# Patient Record
Sex: FEMALE | Race: WHITE | NOT HISPANIC OR LATINO
[De-identification: names, ages, dates, MRNs, and addresses within clinical notes are randomized per-mention and may not be internally consistent; named-entity substitution may affect disease eponyms.]

---

## 2017-08-21 ENCOUNTER — RX ONLY (RX ONLY)
Age: 62
End: 2017-08-21

## 2017-08-21 RX ORDER — TRETINOIN MICROSPHERES 0.04 %
GEL (GRAM) TOPICAL
Qty: 1 | Refills: 6 | Status: ERX

## 2017-09-06 ENCOUNTER — RX ONLY (RX ONLY)
Age: 62
End: 2017-09-06

## 2017-09-06 ENCOUNTER — APPOINTMENT (OUTPATIENT)
Dept: URBAN - METROPOLITAN AREA CLINIC 200 | Age: 62
Setting detail: DERMATOLOGY
End: 2017-09-08

## 2017-09-06 DIAGNOSIS — L81.7 PIGMENTED PURPURIC DERMATOSIS: ICD-10-CM

## 2017-09-06 DIAGNOSIS — L57.8 OTHER SKIN CHANGES DUE TO CHRONIC EXPOSURE TO NONIONIZING RADIATION: ICD-10-CM

## 2017-09-06 DIAGNOSIS — D22 MELANOCYTIC NEVI: ICD-10-CM

## 2017-09-06 PROBLEM — F41.9 ANXIETY DISORDER, UNSPECIFIED: Status: ACTIVE | Noted: 2017-09-06

## 2017-09-06 PROBLEM — D22.5 MELANOCYTIC NEVI OF TRUNK: Status: ACTIVE | Noted: 2017-09-06

## 2017-09-06 PROBLEM — E13.9 OTHER SPECIFIED DIABETES MELLITUS WITHOUT COMPLICATIONS: Status: ACTIVE | Noted: 2017-09-06

## 2017-09-06 PROBLEM — D23.5 OTHER BENIGN NEOPLASM OF SKIN OF TRUNK: Status: ACTIVE | Noted: 2017-09-06

## 2017-09-06 PROCEDURE — 99213 OFFICE O/P EST LOW 20 MIN: CPT

## 2017-09-06 PROCEDURE — OTHER REASSURANCE: OTHER

## 2017-09-06 PROCEDURE — OTHER COUNSELING: OTHER

## 2017-09-06 RX ORDER — TRETINOIN MICROSPHERES 0.04 %
GEL (GRAM) TOPICAL
Qty: 1 | Refills: 6 | Status: ERX

## 2017-09-06 RX ORDER — HYDROQUINONE 4 %
CREAM (GRAM) TOPICAL
Qty: 1 | Refills: 6 | Status: CANCELLED

## 2017-09-06 RX ORDER — GLYCOPYRROLATE 1 MG/1
TABLET ORAL
Qty: 90 | Refills: 3 | Status: CANCELLED
Stop reason: CLARIF

## 2017-09-06 ASSESSMENT — LOCATION DETAILED DESCRIPTION DERM
LOCATION DETAILED: LEFT DISTAL PRETIBIAL REGION
LOCATION DETAILED: UPPER STERNUM

## 2017-09-06 ASSESSMENT — LOCATION ZONE DERM
LOCATION ZONE: TRUNK
LOCATION ZONE: LEG

## 2017-09-06 ASSESSMENT — LOCATION SIMPLE DESCRIPTION DERM
LOCATION SIMPLE: LEFT PRETIBIAL REGION
LOCATION SIMPLE: CHEST

## 2018-03-05 ENCOUNTER — OFFICE VISIT (OUTPATIENT)
Dept: OBSTETRICS AND GYNECOLOGY | Facility: CLINIC | Age: 63
End: 2018-03-05
Attending: OBSTETRICS & GYNECOLOGY
Payer: COMMERCIAL

## 2018-03-05 VITALS
SYSTOLIC BLOOD PRESSURE: 140 MMHG | HEIGHT: 61 IN | DIASTOLIC BLOOD PRESSURE: 80 MMHG | WEIGHT: 215 LBS | BODY MASS INDEX: 40.59 KG/M2

## 2018-03-05 DIAGNOSIS — Z01.419 WOMEN'S ANNUAL ROUTINE GYNECOLOGICAL EXAMINATION: ICD-10-CM

## 2018-03-05 DIAGNOSIS — Z11.51 SPECIAL SCREENING EXAMINATION FOR HUMAN PAPILLOMAVIRUS (HPV): ICD-10-CM

## 2018-03-05 DIAGNOSIS — Z00.00 ANNUAL PHYSICAL EXAM: Primary | ICD-10-CM

## 2018-03-05 DIAGNOSIS — Z12.4 ROUTINE CERVICAL SMEAR: ICD-10-CM

## 2018-03-05 PROCEDURE — 99396 PREV VISIT EST AGE 40-64: CPT | Performed by: OBSTETRICS & GYNECOLOGY

## 2018-03-05 RX ORDER — MULTIVITAMIN WITH IRON
TABLET ORAL
COMMUNITY

## 2018-03-05 RX ORDER — ACARBOSE 50 MG/1
TABLET ORAL
Refills: 11 | COMMUNITY
Start: 2018-01-16 | End: 2018-09-14 | Stop reason: SDUPTHER

## 2018-03-05 RX ORDER — METFORMIN HYDROCHLORIDE 500 MG/1
TABLET ORAL
COMMUNITY
Start: 2017-06-26 | End: 2018-08-16

## 2018-03-05 RX ORDER — SERTRALINE HYDROCHLORIDE 100 MG/1
100 TABLET, FILM COATED ORAL EVERY EVENING
Refills: 2 | COMMUNITY
Start: 2018-02-22

## 2018-03-05 RX ORDER — ASPIRIN 81 MG/1
TABLET ORAL
COMMUNITY
End: 2022-08-03

## 2018-03-05 RX ORDER — FENOFIBRIC ACID 135 MG/1
135 CAPSULE, DELAYED RELEASE ORAL
Refills: 5 | COMMUNITY
Start: 2018-02-13 | End: 2018-06-26 | Stop reason: SDUPTHER

## 2018-03-05 RX ORDER — LANCETS
EACH MISCELLANEOUS
COMMUNITY
Start: 2016-08-25 | End: 2019-01-03 | Stop reason: SDUPTHER

## 2018-03-05 RX ORDER — DULAGLUTIDE 1.5 MG/.5ML
INJECTION, SOLUTION SUBCUTANEOUS
Refills: 4 | COMMUNITY
Start: 2018-02-12 | End: 2018-03-26 | Stop reason: SDUPTHER

## 2018-03-05 NOTE — PROGRESS NOTES
"3/5/2018  Citlali Gifford is a 62 y.o. female who presents for annual exam.     Current contraception: none  History of abnormal Pap smear: no  Family history of:  colon  History of abnormal mammogram: no  Family history of breast cancer: no  History of abnormal lipids: no  Family history of heart disease:  no  Menstrual History:  OB History     No data available         No LMP recorded.         Review of Systems and Physical Exam  The following have been reviewed and updated as appropriate in this visit: Problems       /80 (BP Location: Left upper arm, Patient Position: Sitting)   Ht 1.549 m (5' 1\")   Wt 97.5 kg (215 lb)   BMI 40.62 kg/m²   ALEXANDRA Gifford presented today for routine gynecological vaginal evaluation.  She had some internal vaginal itching with minimal discharge review of systems were negative for chest pain shortness of breath she is passing urine okay moving her bowels no other gynecological issues her physical exam was normal.      Review of Systems  OBGyn Exam    Assessment and Plan    Assessment and plan is for routine follow-up in 1 year she is encouraged to lose weight numbers given for stephanie Reina who is a medical doctor with nutrition she is asked to speak to her medical doctor about Victoza along with exercise.    No orders of the defined types were placed in this encounter.          .  All questions answered.  Await pap smear results.  Breast self exam technique reviewed and patient encouraged to perform self-exam monthly..  No Follow-up on file.  Griffin Herrera MD      "

## 2018-03-06 LAB
CLINICAL INFO: NORMAL
CYTO CVX: NORMAL
CYTOLOGIST CVX/VAG CYTO: NORMAL
CYTOLOGY CMNT CVX/VAG CYTO-IMP: NORMAL
DATE OF PREVIOUS PAP: NORMAL
DATE PREVIOUS BX: NORMAL
HPV E6+E7 MRNA CVX QL NAA+PROBE: NOT DETECTED
LMP START DATE: NORMAL
SPECIMEN SOURCE CVX/VAG CYTO: NORMAL
STAT OF ADQ CVX/VAG CYTO-IMP: NORMAL

## 2018-03-12 ENCOUNTER — TELEPHONE (OUTPATIENT)
Dept: OBSTETRICS AND GYNECOLOGY | Facility: CLINIC | Age: 63
End: 2018-03-12

## 2018-03-12 NOTE — TELEPHONE ENCOUNTER
needs refil for [facility] 1/day Rx# [id number].  She asked for you to call other .[phone]

## 2018-03-13 RX ORDER — ESTRADIOL 10 UG/1
10 INSERT VAGINAL 2 TIMES WEEKLY
Qty: 24 TABLET | Refills: 3 | Status: SHIPPED | OUTPATIENT
Start: 2018-03-15 | End: 2019-02-19 | Stop reason: SDUPTHER

## 2018-03-23 LAB
ALBUMIN SERPL-MCNC: 4.3 G/DL (ref 3.6–5.1)
ALBUMIN/CREAT UR: 8 MCG/MG CREAT
ALBUMIN/GLOB SERPL: 1.4 (CALC) (ref 1–2.5)
ALP SERPL-CCNC: 52 U/L (ref 33–130)
ALT SERPL-CCNC: 63 U/L (ref 6–29)
AST SERPL-CCNC: 35 U/L (ref 10–35)
BILIRUB SERPL-MCNC: 0.4 MG/DL (ref 0.2–1.2)
BUN SERPL-MCNC: 23 MG/DL (ref 7–25)
BUN/CREAT SERPL: ABNORMAL (CALC) (ref 6–22)
CALCIUM SERPL-MCNC: 9.9 MG/DL (ref 8.6–10.4)
CHLORIDE SERPL-SCNC: 104 MMOL/L (ref 98–110)
CHOLEST SERPL-MCNC: 177 MG/DL
CHOLEST/HDLC SERPL: 4.3 (CALC)
CO2 SERPL-SCNC: 27 MMOL/L (ref 20–31)
CREAT SERPL-MCNC: 0.91 MG/DL (ref 0.5–0.99)
CREAT UR-MCNC: 156 MG/DL (ref 20–320)
GFR SERPL CREATININE-BSD FRML MDRD: 68 ML/MIN/1.73M2
GLOBULIN SER CALC-MCNC: 3 G/DL (CALC) (ref 1.9–3.7)
GLUCOSE SERPL-MCNC: 113 MG/DL (ref 65–99)
HBA1C MFR BLD: 6.6 % OF TOTAL HGB
HDLC SERPL-MCNC: 41 MG/DL
LDLC SERPL CALC-MCNC: 106 MG/DL (CALC)
MICROALBUMIN UR-MCNC: 1.2 MG/DL
NONHDLC SERPL-MCNC: 136 MG/DL (CALC)
POTASSIUM SERPL-SCNC: 4.5 MMOL/L (ref 3.5–5.3)
PROT SERPL-MCNC: 7.3 G/DL (ref 6.1–8.1)
SODIUM SERPL-SCNC: 138 MMOL/L (ref 135–146)
TRIGL SERPL-MCNC: 181 MG/DL

## 2018-03-26 ENCOUNTER — OFFICE VISIT (OUTPATIENT)
Dept: ENDOCRINOLOGY | Facility: CLINIC | Age: 63
End: 2018-03-26
Payer: COMMERCIAL

## 2018-03-26 VITALS
SYSTOLIC BLOOD PRESSURE: 138 MMHG | BODY MASS INDEX: 40.22 KG/M2 | HEIGHT: 61 IN | HEART RATE: 77 BPM | WEIGHT: 213 LBS | DIASTOLIC BLOOD PRESSURE: 84 MMHG

## 2018-03-26 DIAGNOSIS — I10 ESSENTIAL HYPERTENSION: ICD-10-CM

## 2018-03-26 DIAGNOSIS — E11.9 TYPE 2 DIABETES MELLITUS WITHOUT COMPLICATION, WITHOUT LONG-TERM CURRENT USE OF INSULIN (CMS/HCC): Primary | ICD-10-CM

## 2018-03-26 DIAGNOSIS — E78.2 MIXED HYPERLIPIDEMIA: ICD-10-CM

## 2018-03-26 PROBLEM — E78.5 DYSLIPIDEMIA: Status: ACTIVE | Noted: 2018-03-26

## 2018-03-26 PROCEDURE — 99214 OFFICE O/P EST MOD 30 MIN: CPT

## 2018-03-26 RX ORDER — DULAGLUTIDE 1.5 MG/.5ML
1.5 INJECTION, SOLUTION SUBCUTANEOUS WEEKLY
Qty: 4 PEN | Refills: 11 | Status: SHIPPED | OUTPATIENT
Start: 2018-03-26 | End: 2019-04-26 | Stop reason: SDUPTHER

## 2018-03-26 RX ORDER — SIMVASTATIN 10 MG/1
10 TABLET, FILM COATED ORAL NIGHTLY
Qty: 30 TABLET | Refills: 11 | Status: SHIPPED | OUTPATIENT
Start: 2018-03-26 | End: 2019-02-11 | Stop reason: SDUPTHER

## 2018-03-26 NOTE — PROGRESS NOTES
Daily Progress Note      Subjective      Patient ID: Citlali Gifford is a 62 y.o. female.    HPI 61 year old female with diabetes mellitus, HTN, obesity, presents for follow up visit.    Reviewed pmhx/med rec.  Lost 6 lbs.  Taking:  Trulicity 1.5 mg weekly.  Metformin 2000 mg BID.  Acarbose 50 mg pre meal.  Stopped jardiance in past with vaginal yeast infection, no recent infections.    No overactive bowels.  No increased abdominal cramps.    Not taking acarbose regularly.    Trying to limit carbs.  Using weight watchers.  Trying for regular exercise.    No changes in skin, vision.  Sees eye doctor yearly, no DR, blurry or lost vision.    Eats 3 meals per day, no carb restriction.    Has HTN, taking quinapril 10 mg daily, she tried higher dose stopped with fatigue.  No oral numbness or tingling.  No dizziness or light headedness.    Has dyslipidemia, taking trilipix 135 mg daily.  No muscle aches or pains.    No pancreatitis, no thyroid cancer.    Mother has DM2.    Diagnosed with DM2 3 years ago on screening with increased thirst/urination.    2/11/17  cr/gfr 0.7/>60  A1c 7.2  /44/101/163    9/19/17  cr/gfr 0.9/>60  A1c 6.7    3/22/18  A1c 6.6  /41/106/181  Cr/gfr 0.9/>60  Urine mc 8           Review of Systems    Per hpi      Current Outpatient Prescriptions   Medication Sig Dispense Refill   • acarbose (PRECOSE) 50 mg tablet TAKE 1 TABLET BY ORAL ROUTE 3 TIMES EVERY DAY AT THE START (WITH THE FIRST BITE) OF EACH MAIN MEAL  11   • aspirin (ASPIR-81) 81 mg enteric coated tablet take 1 tablet by oral route  every day     • blood sugar diagnostic (CONTOUR NEXT STRIPS) strip test twice daily     • choline fenofibrate (TRILIPIX) 135 mg capsule Take 135 mg by mouth once daily.  5   • DULAGLUTIDE (TRULICITY SUBQ) Inject under the skin.     • ESCITALOPRAM OXALATE (LEXAPRO ORAL) Take by mouth.     • estradiol (YUVAFEM) 10 mcg tablet Insert 1 tablet (10 mcg total) into the vagina 2 (two) times a week. 24  "tablet 3   • lancets (MICROLET LANCET) misc checks BGs 2x/day     • metFORMIN (GLUCOPHAGE) 1,000 mg tablet take 2 tablet by oral route 2 times every day with morning and evening meals     • METFORMIN HCL (METFORMIN ORAL) Take by mouth.     • omega-3 fatty acids-fish oil (omega-3) 300-1,000 mg capsule No SIG Entered     • sertraline (ZOLOFT) 100 mg tablet Take 100 mg by mouth every evening.  2   • sertraline (ZOLOFT) 50 mg tablet Take 50 mg by mouth daily.     • TRULICITY 1.5 mg/0.5 mL pen injector INJECT 0.5ML SUBCUTANEOUSLY EVERY WEEK IN THE ABDOMEN THIGH OR UPPER ARM ROTATING SITES  4     No current facility-administered medications for this visit.      No past medical history on file.  Family History   Problem Relation Age of Onset   • Colon cancer Paternal Grandfather      Past Surgical History:   Procedure Laterality Date   • DILATION AND EVACUATION     • GALLBLADDER SURGERY     • HERNIA REPAIR     • HYSTERECTOMY       Social History     Social History   • Marital status:      Spouse name: N/A   • Number of children: N/A   • Years of education: N/A     Occupational History   • Not on file.     Social History Main Topics   • Smoking status: Not on file   • Smokeless tobacco: Not on file   • Alcohol use Not on file   • Drug use: Unknown   • Sexual activity: Not on file     Other Topics Concern   • Not on file     Social History Narrative   • No narrative on file     Allergies   Allergen Reactions   • Sulfa (Sulfonamide Antibiotics)      Other reaction(s): Unknown   • Wellbutrin [Bupropion Hcl]      Other reaction(s): rash   • Augmentin [Amoxicillin-Pot Clavulanate] Rash       Objective    Vitals:    03/26/18 1012   BP: 138/84   BP Location: Left upper arm   Patient Position: Sitting   Pulse: 77   Weight: 96.6 kg (213 lb)   Height: 1.549 m (5' 1\")     Physical Exam   Constitutional: She is oriented to person, place, and time. She appears well-developed.   HENT:   Head: Normocephalic.   Eyes: Pupils are " equal, round, and reactive to light.   Neck: Normal range of motion.   Pulmonary/Chest: Effort normal.   Abdominal:   obese   Neurological: She is oriented to person, place, and time.   Skin:   No acanthosis   Psychiatric: She has a normal mood and affect.       Assessment/Plan       Type 2 diabetes mellitus without complication (CMS/HCC) (HCC)  Diabetes mellitus type 2 well controlled  Discussed home glucose and A1c goals  Rec ophtho, dental, podiatry, nutrition visits  Rec low consistent carb diet outlined, regular exercise, weight loss  Check testing 4 times per day  Trulicity 1.5 mg weekly  Metformin 1000 mg BID  Acarbose 50 mg TID  Discussed med side effects  Check testing for next visit  Spent 35 min with pt care greater than 3/4 directly with pt        Essential hypertension  HTN not at goal  Increase to quinapril 15 mg daily  Discussed med side effects  Check yearly urine mc neg      Dyslipidemia  Dyslipidemia not at goal  Start simvastatin 10 mg daily  Check 6 month FLP   Discussed med side effects          Francisco Samson MD  3/26/2018

## 2018-05-17 ENCOUNTER — TELEPHONE (OUTPATIENT)
Dept: CARDIOLOGY | Facility: HOSPITAL | Age: 63
End: 2018-05-17

## 2018-05-30 ENCOUNTER — TELEPHONE (OUTPATIENT)
Dept: ENDOCRINOLOGY | Facility: CLINIC | Age: 63
End: 2018-05-30

## 2018-06-26 ENCOUNTER — OFFICE VISIT (OUTPATIENT)
Dept: OTOLARYNGOLOGY | Facility: CLINIC | Age: 63
End: 2018-06-26
Payer: COMMERCIAL

## 2018-06-26 ENCOUNTER — PROCEDURE VISIT (OUTPATIENT)
Dept: OTOLARYNGOLOGY | Facility: CLINIC | Age: 63
End: 2018-06-26
Payer: COMMERCIAL

## 2018-06-26 VITALS
HEART RATE: 74 BPM | SYSTOLIC BLOOD PRESSURE: 128 MMHG | HEIGHT: 61 IN | DIASTOLIC BLOOD PRESSURE: 82 MMHG | OXYGEN SATURATION: 98 %

## 2018-06-26 DIAGNOSIS — H69.93 DYSFUNCTION OF BOTH EUSTACHIAN TUBES: ICD-10-CM

## 2018-06-26 DIAGNOSIS — J30.1 CHRONIC SEASONAL ALLERGIC RHINITIS DUE TO POLLEN: ICD-10-CM

## 2018-06-26 DIAGNOSIS — J34.2 NASAL SEPTAL DEVIATION: ICD-10-CM

## 2018-06-26 DIAGNOSIS — H90.3 SENSORINEURAL HEARING LOSS (SNHL) OF BOTH EARS: Primary | ICD-10-CM

## 2018-06-26 DIAGNOSIS — Z01.812 PRE-PROCEDURE LAB EXAM: ICD-10-CM

## 2018-06-26 DIAGNOSIS — H83.2X2 VESTIBULAR HYPOFUNCTION, LEFT: ICD-10-CM

## 2018-06-26 DIAGNOSIS — H69.93 CHRONIC DYSFUNCTION OF BOTH EUSTACHIAN TUBES: ICD-10-CM

## 2018-06-26 DIAGNOSIS — H90.A21 SENSORINEURAL HEARING LOSS (SNHL) OF RIGHT EAR WITH RESTRICTED HEARING OF LEFT EAR: Primary | ICD-10-CM

## 2018-06-26 PROCEDURE — 92567 TYMPANOMETRY: CPT | Performed by: AUDIOLOGIST

## 2018-06-26 PROCEDURE — 92557 COMPREHENSIVE HEARING TEST: CPT | Performed by: AUDIOLOGIST

## 2018-06-26 PROCEDURE — 99244 OFF/OP CNSLTJ NEW/EST MOD 40: CPT | Mod: 25 | Performed by: OTOLARYNGOLOGY

## 2018-06-26 RX ORDER — QUINAPRIL 10 MG/1
20 TABLET ORAL
Refills: 5 | COMMUNITY
Start: 2018-05-01 | End: 2019-06-26 | Stop reason: SDUPTHER

## 2018-06-26 RX ORDER — AZELASTINE 1 MG/ML
1 SPRAY, METERED NASAL NIGHTLY
Qty: 30 ML | Refills: 6 | Status: SHIPPED | OUTPATIENT
Start: 2018-06-26 | End: 2018-07-26

## 2018-06-26 RX ORDER — FENOFIBRIC ACID 135 MG/1
135 CAPSULE, DELAYED RELEASE ORAL
COMMUNITY
Start: 2016-12-23 | End: 2018-08-16

## 2018-06-26 RX ORDER — VIT C/E/ZN/COPPR/LUTEIN/ZEAXAN 250MG-90MG
CAPSULE ORAL
COMMUNITY
Start: 2016-12-23

## 2018-06-26 RX ORDER — LORAZEPAM 0.5 MG/1
0.5 TABLET ORAL ONCE
Qty: 2 TABLET | Refills: 0 | Status: SHIPPED | OUTPATIENT
Start: 2018-06-26 | End: 2021-01-12

## 2018-06-26 RX ORDER — MULTIVIT WITH MINERALS/HERBS
TABLET ORAL DAILY
COMMUNITY

## 2018-06-26 RX ORDER — NAPROXEN 500 MG/1
TABLET ORAL
Refills: 1 | COMMUNITY
Start: 2018-05-29 | End: 2018-07-30 | Stop reason: ALTCHOICE

## 2018-06-26 ASSESSMENT — ENCOUNTER SYMPTOMS
NECK STIFFNESS: 1
DYSPHORIC MOOD: 1
DIZZINESS: 1
SHORTNESS OF BREATH: 0
CONSTIPATION: 0
DIARRHEA: 0
ARTHRALGIAS: 0
ADENOPATHY: 0
BACK PAIN: 0
NERVOUS/ANXIOUS: 1
FATIGUE: 0
WEAKNESS: 0
NAUSEA: 0
WHEEZING: 0
FEVER: 0
SINUS PRESSURE: 0
HEADACHES: 0
VOMITING: 0
RHINORRHEA: 0
VOICE CHANGE: 0
SLEEP DISTURBANCE: 0
MYALGIAS: 1
PALPITATIONS: 0
TROUBLE SWALLOWING: 0
COUGH: 0
NECK PAIN: 1
POLYPHAGIA: 0
FREQUENCY: 0

## 2018-06-26 NOTE — PROGRESS NOTES
Patient ID: Citlali Gifford                              : 1955    Visit Date: 2018  Referring Provider: Billy Antunez DO    Chief Complaint: Hearing Loss and vertigo    Citlali Gifford is a 62 y.o. female who presented to the [place] office today for consultation in regard to hearing loss and previous vertigo.    Kailyn has been under the care of Dr. Félix Castillo for many years.  She is aware that she has significant hearing loss but has resisted a hearing aid up until now.  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  Background noise makes her difficulties even more severe.  She denies tinnitus.  She struggles with aural fullness that is particularly bothersome during an upper respiratory infection.  She has allergic rhinitis and this also causes fullness and pressure in the ears when it is not treated.  She does not always use Flonase routinely as it had been prescribed.    In , Kailyn developed an acute sudden onset of severe vertigo associated with nausea and vomiting.  She was treated with meclizine and the symptoms cleared.  Since then she has had two recurrences of the vertigo and each time has been able to rest, take meclizine and clear the symptoms.  She underwent a VNG in April.  I reviewed the entire study today.  This reveals left labyrinthine hypofunction consistent with previous left labyrinthitis.  Vestibular therapy was recommended but the patient has yet to be referred for that therapy.  She decided to seek out a second opinion.    The patient has not undergone otologic surgery nor has she suffered a head trauma..  She has a history of frequent otitis media with effusion and acute otitis externa as a child.  She denies tinnitus.  Her ear canals are sensitive to the touch and she is afraid of cerumen removal procedures.  She tends to wash her ears out with warm water in the shower.   She has not had drainage from the ears nor any itchiness.    Review of Systems   Constitutional: Negative for fatigue and fever.   HENT: Positive for ear pain, hearing loss and postnasal drip. Negative for congestion, nosebleeds, rhinorrhea, sinus pressure, tinnitus, trouble swallowing and voice change.         Aural fullness.   Eyes: Negative for visual disturbance.   Respiratory: Negative for cough, shortness of breath and wheezing.    Cardiovascular: Negative for chest pain and palpitations.   Gastrointestinal: Negative for constipation, diarrhea, nausea and vomiting.   Endocrine: Negative for polyphagia and polyuria.   Genitourinary: Negative for frequency.   Musculoskeletal: Positive for myalgias, neck pain and neck stiffness. Negative for arthralgias, back pain and gait problem.   Skin: Negative for rash.   Allergic/Immunologic: Negative for environmental allergies.   Neurological: Positive for dizziness. Negative for weakness and headaches.   Hematological: Negative for adenopathy.   Psychiatric/Behavioral: Positive for dysphoric mood. Negative for sleep disturbance. The patient is nervous/anxious.      Current Medications: Acarbose, aspirin, b complex vitamins, cholecalciferol (vitamin d3), choline fenofibrate, dulaglutide, estradiol, metformin, omega-3 fatty acids-fish oil, quinapril, sertraline and simvastatin.    Past Medical History: Type 2 diabetes mellitus.  Hyperlipidemia.  Hypertension.  Allergic rhinitis.  Past Surgical History: Status post  section, endometrial ablation, cholecystectomy, inguinal herniorrhaphy, partial hysterectomy and arthroscopic repair torn meniscus.  Social History: The patient is .  She has 3 adult children.  She works as a  in a school.  She rarely drinks alcohol.  She has never used tobacco.  She has not been exposed to excessive amounts of loud noise.  Family History: Mother: Diabetes mellitus.  Father: Coronary artery disease status post  "MI.  Allergies: Wellbutrin: Rash.  Penicillin: Rash.  Sulfa medications: Rash.    Physical Exam:  Vitals: /82   Pulse 74   Ht 1.549 m (5' 1\")   SpO2 98%   General:  Well-developed, centrally overweight 62 y.o. in no acute distress.  Voice: Normal without hoarseness, breathiness or stridor.  Head/face:  No scars or lesions.  No parotid masses. No presinus tenderness. Seventh cranial nerves intact.  Eyes:  Extraocular movements and gaze alignment normal.  No spontaneous or inducible nystagmus.  Ears: Auricles normal.  Cerumen removed bilaterally with micro-suction under binocular magnification.  Skin is slightly macerated but there is no evidence of otitis externa.  Tympanic membranes clear, mobile and without retractions.  Nose:  Dorsum straight.  Septum sinusoidal with partial obstruction of the airways.  Turbinates hypertrophic, purple and edematous.  No pus, polyps or crusts.  Oral Cavity/oropharynx: Macroglossia.  Normal tongue thrust. Normal gag reflex. No masses, leukoplakia, erythroplakia, ulcers or other abnormalities at the tongue, floor of mouth, buccal mucosa or palate.  Cobblestoning at the posterior pharyngeal wall.  Larynx/hypopharynx:  Normal supraglottis.  Vocal folds smooth and white.  Arytenoids edematous and mobile.  Interarytenoid and post-glottic mucosa edematous.  No masses at the base of tongue, vallecula or piriform sinuses.  Neck:  No masses, adenopathy, cervical spasm or thyroid enlargement.  Cranial Nerves II through XII: Grossly intact except for cranial nerves VIII.  Lungs: Clear throughout.  Heart: Regular rate and rhythm.  Mental status: Awake, alert and oriented ×3.  Anxious.    Audiogram: Mild sensorineural hearing loss from 500-6000 Hz on the right sloping to a more severe loss at 8000 Hz on that side.  Mild to moderately severe sensorineural hearing loss above 4000 Hz on the left.  Pure-tone disparities of 5-25 dB right worse than left.  Discrimination scores: 96% at 70 dB " on the right, 92% at 65 DP on the left.  Tympanogram: Negative pressure bilaterally.      Impression:  1.  Sensorineural hearing loss on the right worse than left.  2.  Left vestibular hypofunction.  3.  Bilateral eustachian tube dysfunction.    4.  Allergic rhinitis.  5.  Nasal septal deviation causing partial obstruction of the airways.    Recommendations at plan:  1.  Amplification is recommended for the right ear.  The patient must determine if she has coverage under her [employer] plan.  2.  Simply Saline nasal mist, 2 squirts to each nostril twice daily.  3.  Flonase Sensimist, 1 squirt to each nostril each morning.  4.  Astelin, 1 squirt to each nostril each evening until November.  5.  Habituation exercises twice daily.  Ultimately the patient may benefit from vestibular therapy if her symptoms recur.  6.  MRI of the brain and internal auditory canals to rule out retrocochlear lesion.  This was previously recommended but the patient did not follow through.  She requested a sedative drug for the procedure.  She was given a prescription for a single dose of lorazepam and she will undergo her study in an open, upright imaging center.  7.  Follow-up after MRI.          Lilian Willoughby MD

## 2018-06-26 NOTE — PATIENT INSTRUCTIONS
1.  You need a hearing aid for the right ear.  Please contact [employer] to determine if you already have coverage at specific facilities for hearing aid fitting.  We can help you at our [place] office but we are not part of the [employer] program.  2.  Simply saline nasal mist, 2 squirts to each nostril twice daily.  3.  Flonase Sensimist, 1 squirt to each nostril each morning.  4.  Astelin, 1 squirt to each nostril each evening until November.  5.  Habituation exercises.  Try all of the exercises.  If any of them make you feel dizzy, do these exercises twice daily until the dizziness reaction resolves.  If none of them exercises make you feel dizzy right now, you do not need to do any of them.  Please contact me if you develop vertigo.  6. Labs followed by MRI.  We will get authorization for the scan.  Take lorazepam 30 minutes before the scan.  7.  Follow-up after MRI.

## 2018-06-27 ENCOUNTER — OFFICE VISIT (OUTPATIENT)
Dept: CARDIOLOGY | Facility: CLINIC | Age: 63
End: 2018-06-27
Attending: INTERNAL MEDICINE
Payer: COMMERCIAL

## 2018-06-27 ENCOUNTER — HOSPITAL ENCOUNTER (OUTPATIENT)
Dept: CARDIOLOGY | Facility: HOSPITAL | Age: 63
Discharge: HOME | End: 2018-06-27
Attending: INTERNAL MEDICINE
Payer: COMMERCIAL

## 2018-06-27 VITALS
WEIGHT: 216 LBS | BODY MASS INDEX: 40.78 KG/M2 | HEART RATE: 68 BPM | DIASTOLIC BLOOD PRESSURE: 76 MMHG | SYSTOLIC BLOOD PRESSURE: 124 MMHG | HEIGHT: 61 IN | RESPIRATION RATE: 14 BRPM | TEMPERATURE: 98.6 F

## 2018-06-27 VITALS
BODY MASS INDEX: 40.22 KG/M2 | DIASTOLIC BLOOD PRESSURE: 80 MMHG | WEIGHT: 213 LBS | HEIGHT: 61 IN | SYSTOLIC BLOOD PRESSURE: 120 MMHG | HEART RATE: 69 BPM

## 2018-06-27 DIAGNOSIS — Z09 CARDIOLOGY FOLLOW-UP ENCOUNTER: Primary | ICD-10-CM

## 2018-06-27 DIAGNOSIS — R00.8 CANNONBALL PULSE: ICD-10-CM

## 2018-06-27 LAB
AORTIC ROOT ANNULUS - M-MODE: 2.9 CM
AORTIC VALVE MEAN VELOCITY: 1.74 M/S
AORTIC VALVE VELOCITY TIME INTEGRAL: 43.6 CM
AV MEAN GRADIENT: 14 MMHG
AV PEAK GRADIENT: 22 MMHG
AV PEAK VELOCITY-S: 2.35 M/S
AV VALVE AREA: 1.59 CM2
BSA FOR ECHO PROCEDURE: 2.04 M2
CUSP SEPARATION: 1.3 CM
DOP CALC LVOT STROKE VOLUME: 68.14 ML
E WAVE DECELERATION TIME: 335 MS
E/A RATIO: 0.7
E/E' RATIO: 8.8
E/LAT E' RATIO: 6.9
EDV (BP): 60 CM3
EF (A4C): 71 %
EF A2C: 68 %
EJECTION FRACTION: 70 %
EST RIGHT VENT SYSTOLIC PRESSURE BY TRICUSPID REGURGITATION JET: 24 MMHG
ESV (BP): 18 CM3
FRACTIONAL SHORTENING: 37.5 %
INTERVENTRICULAR SEPTUM: 1.2 CM
LA ESV (BP): 32 CM3
LA ESV INDEX (A2C): 11.76 CM3/M2
LA ESV INDEX (BP): 15.69 CM3/M2
LA/AORTA RATIO: 1.07
LAAS-AP2: 11.7 CM2
LAAS-AP4: 15.4 CM2
LAD 2D - M-MODE: 3.1 CM
LALD A4C: 4.6 CM
LALD A4C: 4.93 CM
LAV-S: 24 CM3
LEFT ATRIUM VOLUME INDEX: 19.12 CM3/M2
LEFT ATRIUM VOLUME: 39 CM3
LEFT INTERNAL DIMENSION IN SYSTOLE: 2 CM (ref 2.76–4.17)
LEFT VENTRICLE DIASTOLIC VOLUME INDEX: 30.88 CM3/M2
LEFT VENTRICLE DIASTOLIC VOLUME: 63 CM3
LEFT VENTRICLE SYSTOLIC VOLUME INDEX: 8.82 CM3/M2
LEFT VENTRICLE SYSTOLIC VOLUME: 18 CM3
LEFT VENTRICULAR INTERNAL DIMENSION IN DIASTOLE: 3.2 CM (ref 4.68–6.5)
LEFT VENTRICULAR POSTERIOR WALL IN END DIASTOLE: 1.2 CM (ref 0.61–1.14)
LV DIASTOLIC VOLUME: 59 CM3
LV ESV (APICAL 2 CHAMBER): 19 CM3
LVAD-AP2: 22.9 CM2
LVAD-AP4: 24.4 CM2
LVAS-AP2: 11.1 CM2
LVAS-AP4: 11.2 CM2
LVEDVI(A2C): 28.92 CM3/M2
LVEDVI(BP): 29.41 CM3/M2
LVESVI(A2C): 9.31 CM3/M2
LVESVI(BP): 8.82 CM3/M2
LVLD-AP2: 8.06 CM
LVLD-AP4: 7.96 CM
LVLS-AP2: 6.56 CM
LVLS-AP4: 6.62 CM
LVOT 2D: 2 CM
LVOT A: 3.14 CM2
LVOT MG: 3 MMHG
LVOT MV: 0.83 M/S
LVOT PEAK VELOCITY: 1.18 M/S
LVOT PG: 6 MMHG
LVOT VTI: 21.7 CM
MV E'TISSUE VEL-LAT: 0.06 M/S
MV E'TISSUE VEL-MED: 0.05 M/S
MV PEAK A VEL: 0.6 M/S
MV PEAK E VEL: 0.43 M/S
POSTERIOR WALL: 1.2 CM
RVOT VMAX: 0.84 M/S
RVOT VTI: 15.7 CM
TR MAX PG: 29 MMHG
TRICUSPID VALVE PEAK REGURGITATION VELOCITY: 2.69 M/S
Z-SCORE OF LEFT VENTRICULAR DIMENSION IN END DIASTOLE: -5.44
Z-SCORE OF LEFT VENTRICULAR DIMENSION IN END SYSTOLE: -4.18
Z-SCORE OF LEFT VENTRICULAR POSTERIOR WALL IN END DIASTOLE: 1.91

## 2018-06-27 PROCEDURE — 93000 ELECTROCARDIOGRAM COMPLETE: CPT | Performed by: INTERNAL MEDICINE

## 2018-06-27 PROCEDURE — 99214 OFFICE O/P EST MOD 30 MIN: CPT | Performed by: INTERNAL MEDICINE

## 2018-06-27 PROCEDURE — 93306 TTE W/DOPPLER COMPLETE: CPT

## 2018-06-27 PROCEDURE — 93306 TTE W/DOPPLER COMPLETE: CPT | Mod: 26 | Performed by: INTERNAL MEDICINE

## 2018-06-27 ASSESSMENT — ENCOUNTER SYMPTOMS
GASTROINTESTINAL NEGATIVE: 1
HEMATOLOGIC/LYMPHATIC NEGATIVE: 1
CARDIOVASCULAR NEGATIVE: 1
RESPIRATORY NEGATIVE: 1
CONSTITUTIONAL NEGATIVE: 1
NERVOUS/ANXIOUS: 1
NUMBNESS: 1
DIZZINESS: 1
EYES NEGATIVE: 1
MUSCULOSKELETAL NEGATIVE: 1

## 2018-06-27 NOTE — LETTER
June 27, 2018     Billy Antunez DO  [address] [facility] [address]    Patient: Citlali Gifford   YOB: 1955   Date of Visit: 6/27/2018       Dear Dr. Antunez:    Thank you for referring Citlali Gifford to me for evaluation. Below are my notes for this consultation.    If you have questions, please do not hesitate to call me. I look forward to following your patient along with you.         Sincerely,        Lupillo Cooley DO        CC: No Recipients  Lupillo Cooley DO  6/27/2018 11:26 AM  Signed      Chief Complaint: I saw Mrs. Gifford in the office today on a routine visit.  I also did a repeat echocardiogram.  She is hemodynamically stable and denies chest discomfort but has some shortness of breath with exertion, not anxiety, cold weather, postprandially, with sex, at rest, or nocturnally.  She can climb a flight of stairs without getting short of breath, she denies proximal nocturnal dyspnea, orthopnea, palpitations, syncope, ankle edema she has nocturia.  She feels tired but she is also very overweight.  Her echocardiogram from today did not show any severe aortic stenosis but mild mitral regurgitation mild tricuspid regurg normal LV function.       Medications:  Current Outpatient Prescriptions on File Prior to Visit   Medication Sig Dispense Refill   • acarbose (PRECOSE) 50 mg tablet TAKE 1 TABLET BY ORAL ROUTE 3 TIMES EVERY DAY AT THE START (WITH THE FIRST BITE) OF EACH MAIN MEAL  11   • aspirin (ASPIR-81) 81 mg enteric coated tablet take 1 tablet by oral route  every day     • azelastine (ASTELIN) 137 mcg (0.1 %) nasal spray Administer 1 spray into each nostril nightly. 30 mL 6   • b complex vitamins tablet No SIG Entered     • blood sugar diagnostic (CONTOUR NEXT STRIPS) strip test twice daily     • cholecalciferol, vitamin D3, (VITAMIN D3) 1,000 unit capsule One tablet daily     • choline fenofibrate (TRILIPIX) 135 mg capsule 135 mg.     • estradiol (YUVAFEM) 10 mcg  tablet Insert 1 tablet (10 mcg total) into the vagina 2 (two) times a week. 24 tablet 3   • lancets (MICROLET LANCET) misc checks BGs 2x/day     • metFORMIN (GLUCOPHAGE) 1,000 mg tablet take 1 tablet by oral route 2 times every day with morning and evening meals     • omega-3 fatty acids-fish oil (omega-3) 300-1,000 mg capsule take 1 capsule daily     • quinapril (ACCUPRIL) 10 mg tablet Take 10 mg by mouth once daily. Take 1 and 1/2 tablet daily   5   • sertraline (ZOLOFT) 100 mg tablet Take 100 mg by mouth every evening.  2   • simvastatin (ZOCOR) 10 mg tablet Take 1 tablet (10 mg total) by mouth nightly. 30 tablet 11   • TRULICITY 1.5 mg/0.5 mL pen injector Inject 0.5 mL (1.5 mg total) under the skin once a week. 4 pen 11   • DULAGLUTIDE (TRULICITY SUBQ) Inject under the skin.     • ESCITALOPRAM OXALATE (LEXAPRO ORAL) Take by mouth.     • LORazepam (ATIVAN) 0.5 mg tablet Take 1 tablet (0.5 mg total) by mouth once for 1 dose. Before MRI 2 tablet 0   • METFORMIN HCL (METFORMIN ORAL) Take by mouth.     • naproxen (NAPROSYN) 500 mg tablet TAKE 1 TABLET BY MOUTH TWICE A DAY AS NEEDED FOR PAIN WITH FOOD  1   • sertraline (ZOLOFT) 50 mg tablet Take 50 mg by mouth daily.       No current facility-administered medications on file prior to visit.        Review of Systems:  Review of Systems   Constitution: Negative.   HENT: Negative.    Eyes: Negative.    Cardiovascular: Negative.    Respiratory: Negative.    Endocrine: Positive for heat intolerance.   Hematologic/Lymphatic: Negative.    Skin: Negative.    Musculoskeletal: Negative.    Gastrointestinal: Negative.    Genitourinary: Positive for nocturia.   Neurological: Positive for dizziness and numbness.   Psychiatric/Behavioral: The patient is nervous/anxious.    Allergic/Immunologic: Positive for environmental allergies.       Physical Exam:  Vitals:    06/27/18 1039   BP: 124/76   Pulse: 68   Resp: 14   Temp: 37 °C (98.6 °F)     Physical Exam   Constitutional: She is  oriented to person, place, and time. She appears well-developed and well-nourished.   overweight   HENT:   Head: Normocephalic and atraumatic.   Eyes: Conjunctivae and EOM are normal. Pupils are equal, round, and reactive to light.   Neck: Normal range of motion. Neck supple.   Cardiovascular:   Murmur (2/6 4lsb) heard.  Pulmonary/Chest: Effort normal and breath sounds normal.   Abdominal: Soft. Bowel sounds are normal.   Musculoskeletal: Normal range of motion.   Neurological: She is alert and oriented to person, place, and time. She has normal reflexes.   Skin: Skin is warm and dry.   Psychiatric: She has a normal mood and affect. Her behavior is normal. Judgment and thought content normal.     EKG:SR T wave abn    Assessment and Plan:  Impression:  Fatigue.  Obesity.  Hypertension controlled.  Diabetes mellitus type 2.  Hyperlipidemia.  Recommendation: We spoke at length about diet and exercise.  I asked her to go back to weight watchers enroll in the point system so she can understand what every thing goes in her mouth because her in terms of calories and weight.  We encouraged her to exercise daily starting 20 minutes a day and adding 2 minutes every week to get to 45 minutes a day to lose weight.  We will see her again in one years time, if there is a problem we will see her sooner.    Lupillo Cooley, DO

## 2018-06-27 NOTE — PROGRESS NOTES
Chief Complaint: I saw Mrs. Gifford in the office today on a routine visit.  I also did a repeat echocardiogram.  She is hemodynamically stable and denies chest discomfort but has some shortness of breath with exertion, not anxiety, cold weather, postprandially, with sex, at rest, or nocturnally.  She can climb a flight of stairs without getting short of breath, she denies proximal nocturnal dyspnea, orthopnea, palpitations, syncope, ankle edema she has nocturia.  She feels tired but she is also very overweight.  Her echocardiogram from today did not show any severe aortic stenosis but mild mitral regurgitation mild tricuspid regurg normal LV function.       Medications:  Current Outpatient Prescriptions on File Prior to Visit   Medication Sig Dispense Refill   • acarbose (PRECOSE) 50 mg tablet TAKE 1 TABLET BY ORAL ROUTE 3 TIMES EVERY DAY AT THE START (WITH THE FIRST BITE) OF EACH MAIN MEAL  11   • aspirin (ASPIR-81) 81 mg enteric coated tablet take 1 tablet by oral route  every day     • azelastine (ASTELIN) 137 mcg (0.1 %) nasal spray Administer 1 spray into each nostril nightly. 30 mL 6   • b complex vitamins tablet No SIG Entered     • blood sugar diagnostic (CONTOUR NEXT STRIPS) strip test twice daily     • cholecalciferol, vitamin D3, (VITAMIN D3) 1,000 unit capsule One tablet daily     • choline fenofibrate (TRILIPIX) 135 mg capsule 135 mg.     • estradiol (YUVAFEM) 10 mcg tablet Insert 1 tablet (10 mcg total) into the vagina 2 (two) times a week. 24 tablet 3   • lancets (MICROLET LANCET) misc checks BGs 2x/day     • metFORMIN (GLUCOPHAGE) 1,000 mg tablet take 1 tablet by oral route 2 times every day with morning and evening meals     • omega-3 fatty acids-fish oil (omega-3) 300-1,000 mg capsule take 1 capsule daily     • quinapril (ACCUPRIL) 10 mg tablet Take 10 mg by mouth once daily. Take 1 and 1/2 tablet daily   5   • sertraline (ZOLOFT) 100 mg tablet Take 100 mg by mouth every evening.  2   •  simvastatin (ZOCOR) 10 mg tablet Take 1 tablet (10 mg total) by mouth nightly. 30 tablet 11   • TRULICITY 1.5 mg/0.5 mL pen injector Inject 0.5 mL (1.5 mg total) under the skin once a week. 4 pen 11   • DULAGLUTIDE (TRULICITY SUBQ) Inject under the skin.     • ESCITALOPRAM OXALATE (LEXAPRO ORAL) Take by mouth.     • LORazepam (ATIVAN) 0.5 mg tablet Take 1 tablet (0.5 mg total) by mouth once for 1 dose. Before MRI 2 tablet 0   • METFORMIN HCL (METFORMIN ORAL) Take by mouth.     • naproxen (NAPROSYN) 500 mg tablet TAKE 1 TABLET BY MOUTH TWICE A DAY AS NEEDED FOR PAIN WITH FOOD  1   • sertraline (ZOLOFT) 50 mg tablet Take 50 mg by mouth daily.       No current facility-administered medications on file prior to visit.        Review of Systems:  Review of Systems   Constitution: Negative.   HENT: Negative.    Eyes: Negative.    Cardiovascular: Negative.    Respiratory: Negative.    Endocrine: Positive for heat intolerance.   Hematologic/Lymphatic: Negative.    Skin: Negative.    Musculoskeletal: Negative.    Gastrointestinal: Negative.    Genitourinary: Positive for nocturia.   Neurological: Positive for dizziness and numbness.   Psychiatric/Behavioral: The patient is nervous/anxious.    Allergic/Immunologic: Positive for environmental allergies.       Physical Exam:  Vitals:    06/27/18 1039   BP: 124/76   Pulse: 68   Resp: 14   Temp: 37 °C (98.6 °F)     Physical Exam   Constitutional: She is oriented to person, place, and time. She appears well-developed and well-nourished.   overweight   HENT:   Head: Normocephalic and atraumatic.   Eyes: Conjunctivae and EOM are normal. Pupils are equal, round, and reactive to light.   Neck: Normal range of motion. Neck supple.   Cardiovascular:   Murmur (2/6 4lsb) heard.  Pulmonary/Chest: Effort normal and breath sounds normal.   Abdominal: Soft. Bowel sounds are normal.   Musculoskeletal: Normal range of motion.   Neurological: She is alert and oriented to person, place, and time.  She has normal reflexes.   Skin: Skin is warm and dry.   Psychiatric: She has a normal mood and affect. Her behavior is normal. Judgment and thought content normal.     EKG:SR T wave abn    Assessment and Plan:  Impression:  Fatigue.  Obesity.  Hypertension controlled.  Diabetes mellitus type 2.  Hyperlipidemia.  Recommendation: We spoke at length about diet and exercise.  I asked her to go back to weight watchers enroll in the point system so she can understand what every thing goes in her mouth because her in terms of calories and weight.  We encouraged her to exercise daily starting 20 minutes a day and adding 2 minutes every week to get to 45 minutes a day to lose weight.  We will see her again in one years time, if there is a problem we will see her sooner.    Lupillo Cooley, DO

## 2018-07-01 LAB
BUN SERPL-MCNC: 23 MG/DL (ref 7–25)
CREAT SERPL-MCNC: 0.84 MG/DL (ref 0.5–0.99)
GFR SERPL CREATININE-BSD FRML MDRD: 75 ML/MIN/1.73M2

## 2018-07-12 ENCOUNTER — OFFICE VISIT (OUTPATIENT)
Dept: SURGERY | Facility: CLINIC | Age: 63
End: 2018-07-12
Payer: COMMERCIAL

## 2018-07-12 VITALS
BODY MASS INDEX: 40.4 KG/M2 | HEART RATE: 82 BPM | TEMPERATURE: 98.1 F | DIASTOLIC BLOOD PRESSURE: 77 MMHG | WEIGHT: 214 LBS | HEIGHT: 61 IN | SYSTOLIC BLOOD PRESSURE: 126 MMHG

## 2018-07-12 DIAGNOSIS — Z86.0101 HISTORY OF ADENOMATOUS POLYP OF COLON: Primary | ICD-10-CM

## 2018-07-12 PROCEDURE — 99213 OFFICE O/P EST LOW 20 MIN: CPT | Performed by: COLON & RECTAL SURGERY

## 2018-07-12 RX ORDER — SODIUM CHLORIDE 9 MG/ML
INJECTION, SOLUTION INTRAVENOUS CONTINUOUS
Status: CANCELLED | OUTPATIENT
Start: 2018-07-31 | End: 2018-08-01

## 2018-07-12 NOTE — LETTER
Dear Dr. Antunez,    I saw Citlali Gifford in the office today in follow-up. Please see my note below.    If you have any additional questions, please do not hesitate to call me.    Sincerely,    Liam Toribio MD      ____________________________________    Colorectal Surgery Follow-up    Subjective     Citlali Gifford is a 62 y.o. female who is due for follow-up colon cancer screening.  Citlali has a history of colon polyps.  She had a polyp removed in the sigmoid colon  that had focal high-grade dysplasia.  In  she had another adenomatous polyp removed from the sigmoid colon.  Her last colonoscopy was 2015 and 4 polyps were removed at that time.  Because of her previous history of high-grade dysplasia and the number of polyps a 3 year colonoscopy was recommended.    She is not having any bowel issues such as blood in the stool or change in her bowel habits.  She has a family history of colon cancer in her paternal grandfather in his early 50s.  Her father had colon polyps.    Medical History:   Past Medical History:   Diagnosis Date   • Hyperthyroidism    • Type 2 diabetes mellitus (CMS/HCC) (HCC)        Surgical History:   Past Surgical History:   Procedure Laterality Date   •  SECTION     • CHOLECYSTECTOMY     • DILATION AND EVACUATION     • GALLBLADDER SURGERY     • HERNIA REPAIR     • HYSTERECTOMY         Allergies: Sulfa (sulfonamide antibiotics); Wellbutrin [bupropion hcl]; and Augmentin [amoxicillin-pot clavulanate]    Current Medications:  •  acarbose  •  aspirin  •  b complex vitamins  •  blood sugar diagnostic  •  cholecalciferol (vitamin D3)  •  choline fenofibrate  •  ESCITALOPRAM OXALATE (LEXAPRO ORAL)  •  estradiol  •  lancets  •  metFORMIN  •  omega-3 fatty acids-fish oil  •  quinapril  •  sertraline  •  simvastatin  •  TRULICITY  •  azelastine  •  DULAGLUTIDE (TRULICITY SUBQ)  •  LORazepam  •  METFORMIN HCL (METFORMIN ORAL)  •  naproxen  •   "sertraline    Objective     Physicial Exam  /77 (BP Location: Right forearm, Patient Position: Sitting)   Pulse 82   Temp 36.7 °C (98.1 °F) (Temporal)   Ht 1.549 m (5' 1\")   Wt 97.1 kg (214 lb)   BMI 40.43 kg/m²      Physical Exam   Constitutional: She is oriented to person, place, and time. She appears well-developed and well-nourished.   HENT:   Head: Normocephalic and atraumatic.   Eyes: Conjunctivae and EOM are normal. Pupils are equal, round, and reactive to light.   Neck: Normal range of motion. Neck supple.   Cardiovascular: Normal rate, regular rhythm and normal heart sounds.    Pulmonary/Chest: Breath sounds normal. She has no wheezes.   Abdominal: Soft. She exhibits no distension and no mass. There is no tenderness.   Musculoskeletal: Normal range of motion.   Lymphadenopathy:     She has no cervical adenopathy.   Neurological: She is alert and oriented to person, place, and time. No cranial nerve deficit.   Skin: Skin is warm and dry. No rash noted.   Psychiatric: She has a normal mood and affect.   Vitals reviewed.          Assessment     Problem List     History of adenomatous polyp of colon - Primary    Current Assessment & Plan     We reviewed her previous colonoscopies and history.  She is due for her next colonoscopy. We discussed the reason for colonoscopy and cancer screening.  We discussed the procedure and the risks and benefits of colonoscopy including the risk of perforation and bleeding from a polyp site.  We discussed bowel prep instructions.  she understands these issues and consents.    She did the MiraLAX prep previously.  Because of her diabetes, she did not want to use Gatorade and we will use smart water.           Relevant Orders    Case request operating room: FLEXIBLE COLONOSCOPY FOR COLORECTAL CANCER SCREENING HIGH RISK (Completed)                Liam Toribio MD      "

## 2018-07-12 NOTE — ASSESSMENT & PLAN NOTE
We reviewed her previous colonoscopies and history.  She is due for her next colonoscopy. We discussed the reason for colonoscopy and cancer screening.  We discussed the procedure and the risks and benefits of colonoscopy including the risk of perforation and bleeding from a polyp site.  We discussed bowel prep instructions.  she understands these issues and consents.    She did the MiraLAX prep previously.  Because of her diabetes, she did not want to use Gatorade and we will use smart water.

## 2018-07-12 NOTE — PATIENT INSTRUCTIONS
[facility]  [facility]    Miralax Prep  Colonoscopy Preparation Planner and Instructions      Your colonoscopy will be performed by  Liam Toribio Jr. M.D.     Location: [facility]. Report to 1st floor Admissions off the main lobby of the [facility].    You are scheduled for a colonoscopy, an examination of the colon (large intestine) with a lighted flexible scope.  During the colonoscopy, if an abnormality is seen, it is usually biopsied at that time.  A biopsy involves removing a portion or all of the abnormal area for processing and subsequent examination under a microscope.     Plan to be with us for a total of two to three hours.  When you arrive, you will need to complete your paperwork and then change into a patient gown.  The nursing staff will perform a brief assessment, place an I.V., and take you into the procedure room where you will be sedated and undergo the colonoscopy.  The colonoscopy itself takes about 15-30 minutes.     After the colonoscopy, you will rest in the recovery area while the sedative wears off.  Due to the sedation, you may not remember your conversation with the doctor after the colonoscopy.  Please have a family member or friend stay with you that can speak with the doctor and nurses after the procedure.  By law, you cannot drive the rest of the day of the colonoscopy.  We advise you to take the entire day off work.    A thorough cleansing of the colon is essential and the examination is most successful if you follow the directions for preparation completely, as outlined below.  If you have any questions about the test or preparation, please do not hesitate to call our office.    It is important for you to bring a list of all prescription medications and non-prescription products (over-the-counter, anti-inflammatory, herbal, vitamins, etc) you are taking and a list of any medications you are allergic to.      Purchase These Items Ahead of Time:  1. Four  Dulcolax tablets (does not need a prescription)  2. One 238-gram bottle of Miralax (over-the-counter, no prescription)  3. 64-ounce bottles of SmartWater  4. Tuck’s pads or Vaseline can be used to protect the anal area especially if you have hemorrhoids.      Colonoscopy Preparation Timeline  Five or more days prior to colonoscopy:  - Arrange for a ride.  If you do not have a ride, we will have to cancel the procedure.    - Purchase the laxative medications listed above.     - Consider obtaining a protective ointment such as Desitin, or Vaseline to protect the anal area during the prep.  You can start to apply it after you take the first laxative.    - If you are taking coumadin (warfarin), or other blood thinners, contact us for specific instructions.  Aspirin is usually stopped one week before the colonoscopy.    - Make any needed arrangements to be off work or school on the day of the colonoscopy.  Please remember, by law, you cannot drive the rest of the day of the colonoscopy.    - Read and familiarize yourself with the preparation instructions below.    - Please call us with any questions.      Three days prior to your colonoscopy:  - Review and plan dietary needs for the next two days.    - Confirm your ride.    - If you have questions, please call us.    Two days prior to your colonoscopy:  - Eat well-balanced meals but try to avoid nuts, popcorn, raw fruit, raw vegetables, and salads.    - List any allergies and all prescription medications and non-prescription products (over-the-counter, anti-inflammatory, herbal, vitamins, etc.) you are taking.  Bring these lists with you on the day of the colonoscopy.      One day prior to your colonoscopy:  - Start on a Clear Liquid Diet when you get up and continue all day.       Clear Liquid Diet  Soups:  Clear bouillon, chicken broth, vegetable broth, beef broth, or consommé    Beverages:  Tea, coffee (without cream/milk), Palomo-Aid, carbonated beverages,  Gatorade  You may add sugar to coffee and tea but not milk or creamer (non-dairy creamers are okay).      Juices:  Cranberry, apple, grape, strained lemonade, limeade, and orange drink  Any juice that you can see through and has no pulp is acceptable.      Dessert:  Italian ices, popsicles, Jell-O, and hard candy.    - Do not drink red colored beverages or eat red Jell-O.      - In order to prevent hunger, some patients drink liquid Sustacal or Ensure, nutritional supplements available over-the-counter in pharmacies. You can drink up to 4 cans up until midnight.  (Sustacal and Ensure are not clear, but are completely absorbed in the small bowel, and are okay.)    Mix the 238-gram bottle of Miralax in 64-ounces of SmartWater.  Shake the solution until the Miralax is dissolved.  Put the solution aside and chill - it helps with the taste.    - No solid food of any kind.      - Throughout the day, make sure to drink at least eight glasses (two quarts) of fluids such as Gatorade or a similar product, preferably not only plain water.    - Take your usual prescription medications (except iron).  If you are on coumadin or other blood thinners, please contact our office and your primary physician for specific instructions at least one week prior to the colonoscopy.    - Diabetic Patients:  You may have your usual breakfast today and should take your diabetic medications.  Monitor your blood sugar at your usual times.     5 p.m.  Take four Dulcolax tablets by mouth with 32-ounces of Clear liquids.    7 p.m.  Drink eight-ounces of the Miralax mixture every 10-15 minutes until the entire solution is gone    - Drink an additional 32-ounces of any clear liquid (without Miralax) over the next one to two hours.     Remember to remain close to toilet facilities!    - You may continue to have clear liquids until midnight.  After midnight, do not eat or drink anything except the colonoscopy prep and you should take your medications  with sips of water.      The day of your colonoscopy:  - Continue with all your usual prescription medications.  Please be sure to take any blood pressure or heart medications the morning of the test with a sip of water.      - Diabetic Patients:  Do not take your diabetes pills today, but bring a dose with you to take after your colonoscopy.  If you are on Insulin, take ½ of usual long-active insulin (such as NPH or lente) and no regular insulin.  Bring the remaining doses with you to take after your colonoscopy.    - When you are ready to leave, your designated  will take you home where you can eat and relax the rest of the day.  You will receive specific instructions about eating, activities, and medications before you leave.    Frequently Asked Questions  1. Is there any way that I can make this taste any better?    You can try sucking on hard candy or rinse your mouth with water or a mouthwash.  Do not eat or drink anything while you are drinking this solution.    2. Why avoid red liquids?    The red color can persist in the colon and potentially look like blood.    3. One of the medications I was instructed to take the morning of my procedure is red.  Can I take it?    Medications for blood pressure, heart conditions, and seizures should be taken the morning of your exam regardless of the color.    4. I feel like vomiting and do not think I can drink any more.  What should I do?    It is important that you continue to drink the solution if possible.  Without a clean bowel, the doctor will not be able to see the inside of your colon to complete the examination.  If you do vomit, wait 30 minutes and begin drinking the solution again.  If not improved, call us and have a phone number of an open pharmacy in case we need to call in a prescription.    5. I drank a lot of the solution and have not gone to the bathroom yet.  What should I do?    Keep drinking.  Most people have a bowel movement after an hour;  some patients may take two hours or longer.    6. I am taking the prep and now having loose, watery stools.  Do I still need the rest of the prep?    Yes, you may have solid stool higher in the colon that needs to be eliminated.    7. I already have diarrhea before taking the prep, do I still have to take the laxative?    Yes, you must take the prep as directed by your doctor.  Your colon is approximately six feet long.  The entire colon must be emptied for your physician to see the colon clearly.    8. I see yellow color in the toilet bowl and a few flecks.  What do I do?    If you drank the entire solution or if your last bowel movements were clear enough that you were able to see the bottom of the toilet, you should be fine.  It is okay if you have some flecks of material.  The yellow color is a result of bile that normally colors the feces.  This should not interfere with the examination.    9. My bottom is so sore.  What can I do?   To clean the area, avoid rubbing.  Gently pat with a wet washcloth.  Apply Vaseline, Balmex, or Desitin liberally.    10. Can I drink alcoholic beverages?    We strongly suggest that you do not drink any alcoholic beverages prior to your procedure since they can cause dehydration and some yusuf may thin your blood.    11. Can I drink any nutritional supplements?    You may drink Ensure (chocolate or vanilla) or Slim-Fast with Soy Protein/Lactose Free.  These drinks are water based, not milk based.    12. Can I chew gum or suck candy?    Yes, but nothing with soft centers or red color.    13. What if I am still passing stool the morning of my test?    Take a tap water enema until you run clear.  If this does not work, call the office.    14. Can I brush my teeth?    Please do.    15. Can I wear my dentures?    Yes, you may wear your dentures to the endoscopy suite.  However, you may be asked to remove them prior to the procedure.    16. I have been instructed not to take  anti-inflammatories or blood thinners several days before the procedure.  What can I take for headaches and pain relief?    You may take Tylenol as directed.    17. Can I have chicken soup?    You can only have the broth; no noodles, chicken, or vegetables allowed.    18. Can I have the colonoscopy if I am on my menstrual period?    Yes, the procedure can still be performed.  We ask that you use a tampon if possible (not absolutely necessary).    19. Do I need a prescription for the laxatives?    It depends on the preparation instructions you were given.  Miralax no longer requires a prescription.    20. Are there risks to the procedure?  If a polyp is removed, there is a 1:50 risk of bleeding from the polypectomy site, so if you see blood in the stool or bloody diarrhea, you must call me.    The biggest risk of the procedure is colon perforation (making a hole in the colon). The risk of this about 1:1000 (but has never happed to me!) This usually requires surgery to fix the hole. In most cases, I can detect this complication at the time of the procedure, but if you develop increasing abdominal pain after you go home, please contact me.    21. How effective is the procedure?  Although colonoscopy is the best test to evaluate the colon, there is about a 5% chance I miss something. This can be due to a poor bowel prep, which makes these preparation instructions so important.  It is possible to miss a small polyp hiding behind a fold. Occasionally, I am unable to get the scope all the way to the end of the colon (the cecum) due to a twisty or redundant colon. If that happens, I send you for X-Ray studies after the procedure to examine what was missed.       If you have any questions, please feel free to call us at [phone]. My  is Destiny Lennon and can be reached directly at [phone]

## 2018-07-17 ENCOUNTER — TELEPHONE (OUTPATIENT)
Dept: ENDOCRINOLOGY | Facility: CLINIC | Age: 63
End: 2018-07-17

## 2018-07-17 NOTE — TELEPHONE ENCOUNTER
Pt. called to schedule an appt for her fu diabetic appt. She was very short with me when I was trying to find an opening for her to come in in August. She wanted  An appt late in the day. She could not understand why it was difficult to get an appt when I explained to her that he was busy and she needed a 20 min. slot. She wanted my name and I did give it to her nicely. I also was asked to send her lab work which was ordered at her last visit. I sent the labs.

## 2018-07-19 ENCOUNTER — TELEPHONE (OUTPATIENT)
Dept: OTOLARYNGOLOGY | Facility: CLINIC | Age: 63
End: 2018-07-19

## 2018-07-19 NOTE — TELEPHONE ENCOUNTER
The patient should go over and look at the imaging center.  I sent her to an open upright MRI facility where she can watch television.  She absolutely needs an injection in order to be able to visualize the inner ear nerve.  She should take the medication and have somebody take her to the facility.  If she cannot follow through then she will have to cancel the procedure.  We will not perform this procedure under general anesthesia.

## 2018-07-19 NOTE — TELEPHONE ENCOUNTER
Pt called stating she is scheduled for an MRI at 6pm this evening, but says she is extremely claustrophobic even with medication and does not feel she can do it. She's asking if it's absolutely necessary to have the study done with contrast? Please advise, thank you

## 2018-07-20 ENCOUNTER — TELEPHONE (OUTPATIENT)
Dept: OTOLARYNGOLOGY | Facility: CLINIC | Age: 63
End: 2018-07-20

## 2018-07-20 DIAGNOSIS — H90.A21 SENSORINEURAL HEARING LOSS (SNHL) OF RIGHT EAR WITH RESTRICTED HEARING OF LEFT EAR: Primary | ICD-10-CM

## 2018-07-20 NOTE — TELEPHONE ENCOUNTER
Patient went to get MRI done yesterday and could not stand to be in the machine that long even with the medication.  She would like to know what else would you suggest she do?

## 2018-07-20 NOTE — TELEPHONE ENCOUNTER
Since the patient cannot tolerate MRI, I would suggest that she try to undergo a CT scan of the inner ears.  This is not as good of a study for her problem but at least will give us some information.  The CT scanner is wide open in a larger room so this should not be a problem.  I have prepared the appropriate referral.  Please contact her in this regard and then we will need to get precertification.

## 2018-07-25 ENCOUNTER — TELEPHONE (OUTPATIENT)
Dept: OTOLARYNGOLOGY | Facility: CLINIC | Age: 63
End: 2018-07-25

## 2018-07-25 NOTE — TELEPHONE ENCOUNTER
The labs from June 30 were normal.  We had contacted her with those normal results on July 3.  Open up the labs tab and you can read off the numbers if she actually needs that.

## 2018-07-31 ENCOUNTER — ANESTHESIA (OUTPATIENT)
Dept: ENDOSCOPY | Facility: HOSPITAL | Age: 63
End: 2018-07-31
Payer: COMMERCIAL

## 2018-07-31 ENCOUNTER — HOSPITAL ENCOUNTER (OUTPATIENT)
Facility: HOSPITAL | Age: 63
Discharge: HOME | End: 2018-07-31
Attending: COLON & RECTAL SURGERY | Admitting: COLON & RECTAL SURGERY
Payer: COMMERCIAL

## 2018-07-31 ENCOUNTER — ANESTHESIA EVENT (OUTPATIENT)
Dept: ENDOSCOPY | Facility: HOSPITAL | Age: 63
End: 2018-07-31
Payer: COMMERCIAL

## 2018-07-31 VITALS
SYSTOLIC BLOOD PRESSURE: 117 MMHG | RESPIRATION RATE: 37 BRPM | OXYGEN SATURATION: 99 % | TEMPERATURE: 96.7 F | HEART RATE: 63 BPM | DIASTOLIC BLOOD PRESSURE: 60 MMHG

## 2018-07-31 DIAGNOSIS — Z86.0101 HISTORY OF ADENOMATOUS POLYP OF COLON: ICD-10-CM

## 2018-07-31 LAB
GLUCOSE BLD-MCNC: 167 MG/DL (ref 70–99)
POCT TEST: ABNORMAL

## 2018-07-31 PROCEDURE — 0DBK8ZX EXCISION OF ASCENDING COLON, VIA NATURAL OR ARTIFICIAL OPENING ENDOSCOPIC, DIAGNOSTIC: ICD-10-PCS | Performed by: COLON & RECTAL SURGERY

## 2018-07-31 PROCEDURE — 37000002 HC ANESTHESIA MAC: Performed by: COLON & RECTAL SURGERY

## 2018-07-31 PROCEDURE — 63600000 HC DRUGS/DETAIL CODE: Mod: JW | Performed by: NURSE ANESTHETIST, CERTIFIED REGISTERED

## 2018-07-31 PROCEDURE — 25000000 HC PHARMACY GENERAL: Performed by: NURSE ANESTHETIST, CERTIFIED REGISTERED

## 2018-07-31 PROCEDURE — 75000011 HC COLONSCOPY BIOPSY: Performed by: COLON & RECTAL SURGERY

## 2018-07-31 PROCEDURE — 45380 COLONOSCOPY AND BIOPSY: CPT | Performed by: COLON & RECTAL SURGERY

## 2018-07-31 PROCEDURE — 88305 TISSUE EXAM BY PATHOLOGIST: CPT | Performed by: COLON & RECTAL SURGERY

## 2018-07-31 PROCEDURE — 25800000 HC PHARMACY IV SOLUTIONS: Performed by: NURSE ANESTHETIST, CERTIFIED REGISTERED

## 2018-07-31 RX ORDER — SODIUM CHLORIDE 9 MG/ML
INJECTION, SOLUTION INTRAVENOUS CONTINUOUS PRN
Status: DISCONTINUED | OUTPATIENT
Start: 2018-07-31 | End: 2018-07-31 | Stop reason: SURG

## 2018-07-31 RX ORDER — PROPOFOL 10 MG/ML
INJECTION, EMULSION INTRAVENOUS CONTINUOUS PRN
Status: DISCONTINUED | OUTPATIENT
Start: 2018-07-31 | End: 2018-07-31 | Stop reason: SURG

## 2018-07-31 RX ORDER — LIDOCAINE HYDROCHLORIDE 10 MG/ML
INJECTION, SOLUTION EPIDURAL; INFILTRATION; INTRACAUDAL; PERINEURAL AS NEEDED
Status: DISCONTINUED | OUTPATIENT
Start: 2018-07-31 | End: 2018-07-31 | Stop reason: SURG

## 2018-07-31 RX ORDER — PROPOFOL 200MG/20ML
SYRINGE (ML) INTRAVENOUS AS NEEDED
Status: DISCONTINUED | OUTPATIENT
Start: 2018-07-31 | End: 2018-07-31 | Stop reason: SURG

## 2018-07-31 RX ADMIN — SODIUM CHLORIDE: 9 INJECTION, SOLUTION INTRAVENOUS at 09:23

## 2018-07-31 RX ADMIN — PROPOFOL 40 MG: 10 INJECTION, EMULSION INTRAVENOUS at 09:32

## 2018-07-31 RX ADMIN — LIDOCAINE HYDROCHLORIDE 5 ML: 10 INJECTION, SOLUTION EPIDURAL; INFILTRATION; INTRACAUDAL; PERINEURAL at 09:29

## 2018-07-31 RX ADMIN — PROPOFOL 125 MCG/KG/MIN: 10 INJECTION, EMULSION INTRAVENOUS at 09:29

## 2018-07-31 RX ADMIN — PROPOFOL 40 MG: 10 INJECTION, EMULSION INTRAVENOUS at 09:29

## 2018-07-31 ASSESSMENT — PAIN SCALES - GENERAL: PAIN_LEVEL: 0

## 2018-07-31 NOTE — ANESTHESIA POSTPROCEDURE EVALUATION
Patient: Citlali Gifford    Procedure Summary     Date:  07/31/18 Room / Location:  [facility] (D) / [facility]    Anesthesia Start:  0923 Anesthesia Stop:  1007    Procedure:  FLEXIBLE COLONOSCOPY PROXIMAL TO SPLENIC FLEXURE WITH BIOPSY (N/A Anus) Diagnosis:       History of adenomatous polyp of colon      (History of adenomatous polyp of colon [Z86.010])    Provider:  Liam Toribio MD Responsible Provider:  David Abraham MD    Anesthesia Type:  MAC ASA Status:  3          Anesthesia Type: MAC  PACU Vitals  7/31/2018 1003 - 7/31/2018 1103      7/31/2018 1005 7/31/2018 1023 7/31/2018 1034 7/31/2018 1040    BP: 91/60 97/60 103/70 117/60    Temp: 36.4 °C (97.6 °F) 36.4 °C (97.6 °F) 36.5 °C (97.7 °F) (!)  35.9 °C (96.7 °F)    Pulse: 70 63 65 63    Resp: 15 (!)  22 (!)  35 (!)  37    SpO2: 99 % 99 % 99 % 99 %            Anesthesia Post Evaluation    Pain score: 0  Pain management: adequate  Patient location during evaluation: PACU  Patient participation: complete - patient participated  Level of consciousness: awake and alert  Cardiovascular status: acceptable  Airway Patency: adequate  Respiratory status: acceptable  Hydration status: acceptable  Anesthetic complications: no

## 2018-07-31 NOTE — OR SURGEON
Pre-Procedure patient identification:  I am the primary operating surgeon/proceduralist and I have identified the patient on 07/31/18 at 9:16 AM Liam Toribio MD  Phone Number: [phone]

## 2018-07-31 NOTE — ANESTHESIA PREPROCEDURE EVALUATION
Anesthesia ROS/MED HX      Pulmonary    Sleep apnea  Neuro/Psych    Anxiety  Cardiovascular   hypertension  Endo/Other   Diabetes   Body Habitus: Obese  ROS/MED HX Comments:    GI/Hepatic/Renal: Colon polyps   Musculoskeletal: Numbness in left foot      Past Surgical History:   Procedure Laterality Date   •  SECTION     • CHOLECYSTECTOMY     • DILATION AND EVACUATION     • ENDOMETRIAL ABLATION     • GALLBLADDER SURGERY     • HERNIA REPAIR     • HYSTERECTOMY     • KNEE ARTHROSCOPY Right     repair torn meniscus   • TONSILLECTOMY         Physical Exam    Airway   Mallampati: II   TM distance: >3 FB   Neck ROM: full  Cardiovascular    Rhythm: regular   Rate: normal  Pulmonary    clear to auscultation        Anesthesia Plan    Plan: MAC    Technique: MAC     Airway: natural airway / supplemental oxygen   ASA 3  Blood Products:   Use of Blood Products Discussed: No   Anesthetic plan and risks discussed with: patient  Induction:    intravenous   Postop Plan:   Patient Disposition: phase II then home   Pain Management: IV analgesics

## 2018-07-31 NOTE — OP NOTE
_______________________________________________________________________________  Patient Name: Citlali Gifford     Procedure Date: 7/31/2018 9:16 AM  MRN: [id number]                     Account Number: [id number]  YOB: 1955             Age: 62  Gender: Female                        Note Status: Finalized  Attending MD: DARA FERNANDEZ MD  _______________________________________________________________________________  Procedure:           Colonoscopy  Indications:         High risk colon cancer surveillance: Personal history of  colonic polyps  Providers:           DARA FERNANDEZ MD (Doctor), Michelle Colón, RN  (Nurse), Ivone Jack RN (Nurse)  Referring MD:        JORGE SANTOS DO  Requesting Provider:  Medicines:           Monitored Anesthesia Care  Complications:       No immediate complications.  _______________________________________________________________________________  Procedure:           After I obtained informed consent, the scope was passed  under direct vision. Throughout the procedure, the  patient's blood pressure, pulse, and oxygen saturations  were monitored continuously. The Colonoscope was  introduced through the anus and advanced to the cecum,  identified by appendiceal orifice and ileocecal valve.  The colonoscopy was performed without difficulty. The  patient tolerated the procedure well. The quality of the  bowel preparation was adequate.  Findings:  A 3 mm polyp was found in the mid ascending colon. The polyp was  sessile. The polyp was removed with a cold biopsy forceps. Resection and  retrieval were complete.  There is no endoscopic evidence of polyps in the rectum, in the sigmoid  colon, in the descending colon and in the transverse colon.  The exam was otherwise without abnormality on direct and retroflexion  views.  Impression:          - One 3 mm polyp in the mid ascending colon, removed  with a cold biopsy forceps. Resected and retrieved.  -  The examination was otherwise normal on direct and  retroflexion views.  Recommendation:      - Telephone my office for pathology results in 1 week.  - Repeat colonoscopy in 5 years for surveillance.  Procedure Code(s):   --- Professional ---  07020, Colonoscopy, flexible; with biopsy, single or  multiple  Diagnosis Code(s):   --- Professional ---  Z86.010, Personal history of colonic polyps  D12.2, Benign neoplasm of ascending colon  CPT copyright 2015 [employer] Association. All rights reserved.  The codes documented in this report are preliminary and upon  review may  be revised to meet current compliance requirements.  Attending Participation:  I personally performed the entire procedure.  Liam Fernandez JR, MD  __________________  LIAM FERNANDEZ MD  7/31/2018 10:06:48 AM  This report has been signed electronically.  Number of Addenda: 0  Note Initiated On: 7/31/2018 9:16 AM

## 2018-07-31 NOTE — H&P (VIEW-ONLY)
"Colorectal Surgery Follow-up    Subjective     Citlali Gifford is a 62 y.o. female who is due for follow-up colon cancer screening.  Citlali has a history of colon polyps.  She had a polyp removed in the sigmoid colon  that had focal high-grade dysplasia.  In  she had another adenomatous polyp removed from the sigmoid colon.  Her last colonoscopy was 2015 and 4 polyps were removed at that time.  Because of her previous history of high-grade dysplasia and the number of polyps a 3 year colonoscopy was recommended.    She is not having any bowel issues such as blood in the stool or change in her bowel habits.  She has a family history of colon cancer in her paternal grandfather in his early 50s.  Her father had colon polyps.    Medical History:   Past Medical History:   Diagnosis Date   • Hyperthyroidism    • Type 2 diabetes mellitus (CMS/HCC) (HCC)        Surgical History:   Past Surgical History:   Procedure Laterality Date   •  SECTION     • CHOLECYSTECTOMY     • DILATION AND EVACUATION     • GALLBLADDER SURGERY     • HERNIA REPAIR     • HYSTERECTOMY         Allergies: Sulfa (sulfonamide antibiotics); Wellbutrin [bupropion hcl]; and Augmentin [amoxicillin-pot clavulanate]    Current Medications:  •  acarbose  •  aspirin  •  b complex vitamins  •  blood sugar diagnostic  •  cholecalciferol (vitamin D3)  •  choline fenofibrate  •  ESCITALOPRAM OXALATE (LEXAPRO ORAL)  •  estradiol  •  lancets  •  metFORMIN  •  omega-3 fatty acids-fish oil  •  quinapril  •  sertraline  •  simvastatin  •  TRULICITY  •  azelastine  •  DULAGLUTIDE (TRULICITY SUBQ)  •  LORazepam  •  METFORMIN HCL (METFORMIN ORAL)  •  naproxen  •  sertraline    Objective     Physicial Exam  /77 (BP Location: Right forearm, Patient Position: Sitting)   Pulse 82   Temp 36.7 °C (98.1 °F) (Temporal)   Ht 1.549 m (5' 1\")   Wt 97.1 kg (214 lb)   BMI 40.43 kg/m²     Physical Exam   Constitutional: She is oriented to person, " place, and time. She appears well-developed and well-nourished.   HENT:   Head: Normocephalic and atraumatic.   Eyes: Conjunctivae and EOM are normal. Pupils are equal, round, and reactive to light.   Neck: Normal range of motion. Neck supple.   Cardiovascular: Normal rate, regular rhythm and normal heart sounds.    Pulmonary/Chest: Breath sounds normal. She has no wheezes.   Abdominal: Soft. She exhibits no distension and no mass. There is no tenderness.   Musculoskeletal: Normal range of motion.   Lymphadenopathy:     She has no cervical adenopathy.   Neurological: She is alert and oriented to person, place, and time. No cranial nerve deficit.   Skin: Skin is warm and dry. No rash noted.   Psychiatric: She has a normal mood and affect.   Vitals reviewed.          Assessment     Problem List     History of adenomatous polyp of colon - Primary    Current Assessment & Plan     We reviewed her previous colonoscopies and history.  She is due for her next colonoscopy. We discussed the reason for colonoscopy and cancer screening.  We discussed the procedure and the risks and benefits of colonoscopy including the risk of perforation and bleeding from a polyp site.  We discussed bowel prep instructions.  she understands these issues and consents.    She did the MiraLAX prep previously.  Because of her diabetes, she did not want to use Gatorade and we will use smart water.           Relevant Orders    Case request operating room: FLEXIBLE COLONOSCOPY FOR COLORECTAL CANCER SCREENING HIGH RISK (Completed)                Liam Toribio MD

## 2018-08-01 ENCOUNTER — TELEPHONE (OUTPATIENT)
Dept: SURGERY | Facility: CLINIC | Age: 63
End: 2018-08-01

## 2018-08-01 LAB
CASE RPRT: NORMAL
CLINICAL INFO: NORMAL
PATH REPORT.FINAL DX SPEC: NORMAL
PATH REPORT.GROSS SPEC: NORMAL

## 2018-08-06 ENCOUNTER — RX ONLY (RX ONLY)
Age: 63
End: 2018-08-06

## 2018-08-06 ENCOUNTER — APPOINTMENT (OUTPATIENT)
Dept: URBAN - METROPOLITAN AREA CLINIC 200 | Age: 63
Setting detail: DERMATOLOGY
End: 2018-08-16

## 2018-08-06 DIAGNOSIS — B00.1 HERPESVIRAL VESICULAR DERMATITIS: ICD-10-CM

## 2018-08-06 DIAGNOSIS — L57.8 OTHER SKIN CHANGES DUE TO CHRONIC EXPOSURE TO NONIONIZING RADIATION: ICD-10-CM

## 2018-08-06 DIAGNOSIS — E11.65 TYPE 2 DIABETES MELLITUS WITH HYPERGLYCEMIA: ICD-10-CM

## 2018-08-06 PROBLEM — L70.0 ACNE VULGARIS: Status: ACTIVE | Noted: 2018-08-06

## 2018-08-06 PROBLEM — D23.4 OTHER BENIGN NEOPLASM OF SKIN OF SCALP AND NECK: Status: ACTIVE | Noted: 2018-08-06

## 2018-08-06 PROBLEM — E13.9 OTHER SPECIFIED DIABETES MELLITUS WITHOUT COMPLICATIONS: Status: ACTIVE | Noted: 2018-08-06

## 2018-08-06 PROCEDURE — 99213 OFFICE O/P EST LOW 20 MIN: CPT

## 2018-08-06 PROCEDURE — OTHER RECOMMENDATIONS: OTHER

## 2018-08-06 PROCEDURE — OTHER COUNSELING: OTHER

## 2018-08-06 PROCEDURE — OTHER PRESCRIPTION: OTHER

## 2018-08-06 RX ORDER — ACYCLOVIR 50 MG/G
OINTMENT TOPICAL
Qty: 1 | Refills: 3 | Status: ERX

## 2018-08-06 RX ORDER — ACYCLOVIR 50 MG/G
OINTMENT TOPICAL
Qty: 1 | Refills: 3 | Status: ERX | COMMUNITY
Start: 2018-08-06

## 2018-08-06 ASSESSMENT — LOCATION ZONE DERM
LOCATION ZONE: TRUNK
LOCATION ZONE: LIP

## 2018-08-06 ASSESSMENT — LOCATION DETAILED DESCRIPTION DERM
LOCATION DETAILED: LEFT MEDIAL SUPERIOR CHEST
LOCATION DETAILED: LEFT INFERIOR VERMILION LIP

## 2018-08-06 ASSESSMENT — LOCATION SIMPLE DESCRIPTION DERM
LOCATION SIMPLE: CHEST
LOCATION SIMPLE: LEFT LIP

## 2018-08-06 NOTE — PROCEDURE: RECOMMENDATIONS
Detail Level: Generalized
Recommendation Preamble: The following recommendations were made during the visit:
Recommendations (Free Text): Nutrition consult to [facility]

## 2018-08-07 ENCOUNTER — OFFICE VISIT (OUTPATIENT)
Dept: OTOLARYNGOLOGY | Facility: CLINIC | Age: 63
End: 2018-08-07
Payer: COMMERCIAL

## 2018-08-07 VITALS
DIASTOLIC BLOOD PRESSURE: 82 MMHG | SYSTOLIC BLOOD PRESSURE: 122 MMHG | WEIGHT: 216.8 LBS | OXYGEN SATURATION: 98 % | HEIGHT: 61 IN | HEART RATE: 74 BPM | BODY MASS INDEX: 40.93 KG/M2

## 2018-08-07 DIAGNOSIS — H69.93 CHRONIC EUSTACHIAN TUBE DYSFUNCTION, BILATERAL: ICD-10-CM

## 2018-08-07 DIAGNOSIS — H90.A21 SENSORINEURAL HEARING LOSS (SNHL) OF RIGHT EAR WITH RESTRICTED HEARING OF LEFT EAR: ICD-10-CM

## 2018-08-07 DIAGNOSIS — J30.1 CHRONIC SEASONAL ALLERGIC RHINITIS DUE TO POLLEN: ICD-10-CM

## 2018-08-07 DIAGNOSIS — H65.491 CHRONIC NONSUPPURATIVE OTITIS MEDIA, RIGHT: Primary | ICD-10-CM

## 2018-08-07 PROCEDURE — 99213 OFFICE O/P EST LOW 20 MIN: CPT | Performed by: OTOLARYNGOLOGY

## 2018-08-07 RX ORDER — MELOXICAM 15 MG/1
15 TABLET ORAL
Refills: 1 | COMMUNITY
Start: 2018-07-29 | End: 2018-08-07 | Stop reason: ALTCHOICE

## 2018-08-07 RX ORDER — AZELASTINE 1 MG/ML
1 SPRAY, METERED NASAL DAILY
COMMUNITY
End: 2023-03-23

## 2018-08-07 ASSESSMENT — ENCOUNTER SYMPTOMS
DIZZINESS: 0
HEADACHES: 0
DIARRHEA: 0
CONSTIPATION: 0
FREQUENCY: 0
SINUS PRESSURE: 0
MYALGIAS: 0
NAUSEA: 0
WHEEZING: 0
TROUBLE SWALLOWING: 0
RHINORRHEA: 0
WEAKNESS: 0
VOMITING: 0
POLYPHAGIA: 0
NERVOUS/ANXIOUS: 0
SHORTNESS OF BREATH: 0
FEVER: 0
ARTHRALGIAS: 0
VOICE CHANGE: 0
BACK PAIN: 0
FATIGUE: 0
PALPITATIONS: 0
DYSPHORIC MOOD: 0
SLEEP DISTURBANCE: 0
COUGH: 0
ADENOPATHY: 0

## 2018-08-07 NOTE — PROGRESS NOTES
[facility]  [address], [address]  [place] [address]  Phone: [phone]  Fax: [phone]      Patient ID: Citlali Gifford                              : 1955    Visit Date: 2018  Referring Provider: Billy Antunez DO    Chief Complaint: Earring loss, right worse than left    Citlali Gifford returned to the [place] office today in regard to history of right sensorineural hearing loss worse than left.    Since seeing me in , Kailyn underwent a CT scan of the temporal bones with contrast because she could not tolerate an MRI even in an upright open scanner.  The study does not reveal any brain or skull base abnormalities but it does shows chronic inflammatory changes in the right mastoid and in the right middle ear space as well. I reviewed the films with the patient today. The primary area of concern is in the right attic where there is partial opacification.  The ossicles appear intact.  The sinuses are clear.    Kailyn had a brief episode of vertigo which she treated with meclizine and rest with good results.  She feels well now.  She continues on Astelin and saline but forgot to purchase the Flonase sensimist.    Review of Systems   Constitutional: Negative for fatigue and fever.   HENT: Positive for hearing loss and tinnitus. Negative for congestion, nosebleeds, postnasal drip, rhinorrhea, sinus pressure, trouble swallowing and voice change.    Eyes: Negative for visual disturbance.   Respiratory: Negative for cough, shortness of breath and wheezing.    Cardiovascular: Negative for chest pain and palpitations.   Gastrointestinal: Negative for constipation, diarrhea, nausea and vomiting.   Endocrine: Negative for polyphagia and polyuria.   Genitourinary: Negative for frequency.   Musculoskeletal: Negative for arthralgias, back pain, gait problem and myalgias.   Skin: Negative for rash.   Allergic/Immunologic: Negative for environmental allergies.  "  Neurological: Negative for dizziness, weakness and headaches.   Hematological: Negative for adenopathy.   Psychiatric/Behavioral: Negative for dysphoric mood and sleep disturbance. The patient is not nervous/anxious.      Current Medications: Acarbose, aspirin, b complex vitamins, cholecalciferol (vitamin d3), choline fenofibrate, dulaglutide, estradiol, meclizine, metformin, omega-3-fish oil, quinapril, sertraline, simvastatin and Trulicity.    Physical Exam:  Vitals: /82   Pulse 74   Ht 1.549 m (5' 1\")   Wt 98.3 kg (216 lb 12.8 oz)   SpO2 98%   BMI 40.96 kg/m²   General:  Well-developed, well-nourished 62 y.o. who is in no acute distress.  Voice: Normal without hoarseness, breathiness or stridor.  Head/face:  No scars or lesions.  No parotid masses. No presinus tenderness. Seventh cranial nerves intact.  Eyes:  Extraocular movements and gaze alignment normal.  Ears: Auricles normal.  External auditory canals free of cerumen.  Tympanic membranes mostly white with superficial scar and slightly retracted.  Nose:  Dorsum straight.  Septum sinusoidal.  Turbinates purple and edematous.  No pus, polyps or crusts.  Oral Cavity/oropharynx: Normal tongue thrust. Normal gag reflex. No masses, leukoplakia, erythroplakia, ulcers or other abnormalities at the tongue, floor of mouth, buccal mucosa or palate. Cobblestoning at the posterior pharyngeal wall.  Larynx/hypopharynx:  Normal supraglottis.  Vocal folds smooth and white.  Arytenoids edematous and mobile.  Interarytenoid and post-glottic mucosa normal.  No masses at the base of tongue, vallecula or piriform sinuses.  Neck:  No masses, adenopathy, cervical spasm or thyroid enlargement.  Cranial Nerves II through XII: Grossly intact except right cranial nerve VIII.  Mental status: Awake, alert and oriented ×3.  No anxiety or depression.    Impression:  1.  Chronic otitis media on the right with inflammatory changes in the right mastoid and attic.  2.  Right " sensorineural hearing loss with minimal loss on the left.  3.  Bilateral eustachian tube dysfunction.    4.  Chronic allergic rhinitis.    Recommendations and Plan:  1.  Simply Saline nasal mist, 2 squirts to each nostril twice daily.  2.  Flonase Sensimist, 1 squirt each nostril each morning.  3.  Astelin, 1 squirt to each nostril each evening.  4.  I referred the patient to Dr. Gregg Nunez at [place] for an ear check.  I would like him to examine her and review her CT scan.  She may need surgery for the right middle ear.   5.  Patient will contact [facility] to find out where she should purchase a hearing aid to get the best benefit.  We can also help her in our [place] office.  6.  Follow-up in 6 months.        Lilian Willoughby MD

## 2018-08-07 NOTE — PATIENT INSTRUCTIONS
1.  Simply saline nasal mist, 2 squirts to each nostril twice daily.  2.  Flonase Sensimist, 1 squirt each nostril each morning.  3.  Astelin, 1 squirt to each nostril each evening.  4.  Please see Dr. Gregg Nunez at [place] for an ear check.  I would like him to examine you and review your CT scan.  You may need surgery for the right middle ear. ([phone]).  5.  Please contact [facility] to find out where you should purchase your hearing aid to get the best benefit.  We can help you with hearing aids in our [place] office.  6.  Follow-up in 6 months.

## 2018-08-13 ENCOUNTER — TELEPHONE (OUTPATIENT)
Dept: ENDOCRINOLOGY | Facility: CLINIC | Age: 63
End: 2018-08-13

## 2018-08-15 LAB
ALBUMIN SERPL-MCNC: 4.2 G/DL (ref 3.6–5.1)
ALBUMIN/GLOB SERPL: 1.6 (CALC) (ref 1–2.5)
ALP SERPL-CCNC: 49 U/L (ref 33–130)
ALT SERPL-CCNC: 80 U/L (ref 6–29)
AST SERPL-CCNC: 49 U/L (ref 10–35)
BILIRUB SERPL-MCNC: 0.6 MG/DL (ref 0.2–1.2)
BUN SERPL-MCNC: 24 MG/DL (ref 7–25)
BUN/CREAT SERPL: ABNORMAL (CALC) (ref 6–22)
CALCIUM SERPL-MCNC: 9.3 MG/DL (ref 8.6–10.4)
CHLORIDE SERPL-SCNC: 104 MMOL/L (ref 98–110)
CHOLEST SERPL-MCNC: 155 MG/DL
CHOLEST/HDLC SERPL: 3.8 (CALC)
CO2 SERPL-SCNC: 25 MMOL/L (ref 20–32)
CREAT SERPL-MCNC: 0.89 MG/DL (ref 0.5–0.99)
GFR SERPL CREATININE-BSD FRML MDRD: 70 ML/MIN/1.73M2
GLOBULIN SER CALC-MCNC: 2.7 G/DL (CALC) (ref 1.9–3.7)
GLUCOSE SERPL-MCNC: 123 MG/DL (ref 65–99)
HBA1C MFR BLD: 6.7 % OF TOTAL HGB
HDLC SERPL-MCNC: 41 MG/DL
LDLC SERPL CALC-MCNC: 91 MG/DL (CALC)
NONHDLC SERPL-MCNC: 114 MG/DL (CALC)
POTASSIUM SERPL-SCNC: 4.2 MMOL/L (ref 3.5–5.3)
PROT SERPL-MCNC: 6.9 G/DL (ref 6.1–8.1)
SODIUM SERPL-SCNC: 138 MMOL/L (ref 135–146)
TRIGL SERPL-MCNC: 134 MG/DL

## 2018-08-16 ENCOUNTER — OFFICE VISIT (OUTPATIENT)
Dept: ENDOCRINOLOGY | Facility: CLINIC | Age: 63
End: 2018-08-16
Payer: COMMERCIAL

## 2018-08-16 VITALS
DIASTOLIC BLOOD PRESSURE: 80 MMHG | HEART RATE: 76 BPM | HEIGHT: 61 IN | WEIGHT: 213.4 LBS | RESPIRATION RATE: 18 BRPM | SYSTOLIC BLOOD PRESSURE: 132 MMHG | BODY MASS INDEX: 40.29 KG/M2

## 2018-08-16 DIAGNOSIS — E78.2 MIXED HYPERLIPIDEMIA: ICD-10-CM

## 2018-08-16 DIAGNOSIS — I10 ESSENTIAL HYPERTENSION: ICD-10-CM

## 2018-08-16 DIAGNOSIS — E11.9 TYPE 2 DIABETES MELLITUS WITHOUT COMPLICATION, WITHOUT LONG-TERM CURRENT USE OF INSULIN (CMS/HCC): Primary | ICD-10-CM

## 2018-08-16 LAB — GLUCOSE BLOOD, POC: 134

## 2018-08-16 PROCEDURE — 99214 OFFICE O/P EST MOD 30 MIN: CPT

## 2018-08-16 PROCEDURE — 82962 GLUCOSE BLOOD TEST: CPT

## 2018-08-16 RX ORDER — METFORMIN HYDROCHLORIDE 500 MG/1
1000 TABLET ORAL 2 TIMES DAILY WITH MEALS
Qty: 120 TABLET | Refills: 11 | Status: SHIPPED | OUTPATIENT
Start: 2018-08-16 | End: 2019-07-10 | Stop reason: SDUPTHER

## 2018-08-16 ASSESSMENT — ENCOUNTER SYMPTOMS
UNEXPECTED WEIGHT CHANGE: 0
DIARRHEA: 0
POLYDIPSIA: 0
TROUBLE SWALLOWING: 0
DIZZINESS: 0
CONSTIPATION: 0
COUGH: 0
PSYCHIATRIC NEGATIVE: 1
MYALGIAS: 0

## 2018-08-16 NOTE — ASSESSMENT & PLAN NOTE
Diabetes mellitus type 2 stable A1c  Reviewed all diabetic blood testing results  Discussed home glucose and A1c goals  Rec ophtho, dental, podiatry, nutrition visits  Rec low consistent carb diet outlined, regular exercise, weight loss  Check testing 4 times per day  Metformin to 1000 mg BID  trulicity 1.5 mg weekly  Acarbose 50 mg pre meal  Discussed med side effects and patient accepts risks  Discussed hypoglycemia symptoms and treatment  No driving if glucose less than 100  Check testing for next visit  Spent 30 min with pt care greater than 50% of time spent counseling/coordinating care, counseling as outlined above  Will make attempt to obtain records from primary physician and relay recommendations to primary physician

## 2018-08-16 NOTE — ASSESSMENT & PLAN NOTE
Dyslipidemia improved at goal but LFT elevated  Stop fenofibrate  Continue simvastatin 10 mg daily  3 month LFT and FLP check  Discussed med side effects

## 2018-08-16 NOTE — PROGRESS NOTES
Daily Progress Note           Patient ID: Citlali Gifford is a 62 y.o. female.      HPI 62 year old female with diabetes mellitus, HTN, obesity, presents for follow up visit.     Reviewed pmhx/med rec.  Higher glucose in morning recently to 150s for a few months.  Snacks at night with carb/fruit.  No overactive bowels.  No increased abdominal cramps.  Weight stable.  She notes fatigue.  Walks 15 minutes per day.    Taking:  Trulicity 1.5 mg weekly.  Metformin 500 mg BID.  Acarbose 50 mg pre meal.    Stopped jardiance in past with vaginal yeast infection, no recent infections.       No changes in vision.  Sees eye doctor yearly, no diabetic retinopathy reported by patient.     Has HTN, taking quinapril 15 mg daily     Has dyslipidemia, started simvastatin 10 mg daily, taking trilipix 135 mg daily.  No muscle aches or pains.     No pancreatitis, no thyroid cancer.     Mother has DM2.     Diagnosed with DM2 3 years ago on screening with increased thirst/urination.     2/11/17  cr/gfr 0.7/>60  A1c 7.2  /44/101/163     9/19/17  cr/gfr 0.9/>60  A1c 6.7  LFT 58/54     3/22/18  A1c 6.6  /41/106/181  Cr/gfr 0.9/>60  Urine mc 8  LFT 35/63    8/14/18  A1c 6.7  LFT 49/80  Cr/gfr 0.8/>60  /41/91/134     The following have been reviewed and updated as appropriate in this visit:    Review of Systems   Constitutional: Negative for unexpected weight change.   HENT: Negative for trouble swallowing.    Eyes: Negative for visual disturbance.   Respiratory: Negative for cough.    Cardiovascular: Negative for chest pain.   Gastrointestinal: Negative for constipation and diarrhea.   Endocrine: Negative for polydipsia and polyuria.   Musculoskeletal: Negative for myalgias.   Skin: Negative for rash.   Neurological: Negative for dizziness.   Psychiatric/Behavioral: Negative.              Current Outpatient Prescriptions   Medication Sig Dispense Refill   • acarbose (PRECOSE) 50 mg tablet TAKE 1 TABLET BY ORAL  ROUTE 3 TIMES EVERY DAY AT THE START (WITH THE FIRST BITE) OF EACH MAIN MEAL  11   • aspirin (ASPIR-81) 81 mg enteric coated tablet take 1 tablet by oral route  every day     • b complex vitamins tablet No SIG Entered     • blood sugar diagnostic (CONTOUR NEXT STRIPS) strip test twice daily     • cholecalciferol, vitamin D3, (VITAMIN D3) 1,000 unit capsule One tablet daily     • choline fenofibrate (TRILIPIX) 135 mg capsule 135 mg.     • estradiol (YUVAFEM) 10 mcg tablet Insert 1 tablet (10 mcg total) into the vagina 2 (two) times a week. 24 tablet 3   • lancets (MICROLET LANCET) misc checks BGs 2x/day     • metFORMIN (GLUCOPHAGE) 500 mg tablet take 1 tablet by oral route 2 times every day with morning and evening meals     • omega-3 fatty acids-fish oil (omega-3) 300-1,000 mg capsule take 1 capsule daily     • quinapril (ACCUPRIL) 10 mg tablet Take 15 mg by mouth once daily. Take 1 and 1/2 tablet daily   5   • sertraline (ZOLOFT) 100 mg tablet Take 100 mg by mouth every evening.  2   • simvastatin (ZOCOR) 10 mg tablet Take 1 tablet (10 mg total) by mouth nightly. 30 tablet 11   • TRULICITY 1.5 mg/0.5 mL pen injector Inject 0.5 mL (1.5 mg total) under the skin once a week. 4 pen 11   • azelastine (ASTELIN) 137 mcg (0.1 %) nasal spray Administer 1 spray into each nostril daily. Use in each nostril as directed.     • DULAGLUTIDE (TRULICITY SUBQ) Inject under the skin.     • LORazepam (ATIVAN) 0.5 mg tablet Take 1 tablet (0.5 mg total) by mouth once for 1 dose. Before MRI 2 tablet 0   • meclizine (ANTIVERT) 25 mg tablet Take 25 mg by mouth 2 (two) times a day as needed for dizziness.       No current facility-administered medications for this visit.      Past Medical History:   Diagnosis Date   • Anxiety    • Arthritis    • Claustrophobia    • Elevated liver enzymes     dx-  fatty liver   • Hypertension    • Impaired hearing     bilateral - no hearing aids   • Lipid disorder    • Numbness     left foot- secondary to  "previous  back injury    • Postmenopausal    • Sleep apnea     compliant with CPAP- nightly   • Tendonitis     left shoulder - treatment with PT   • Type 2 diabetes mellitus (CMS/HCC) ([facility])    • Vertigo     ongoing- intermittent with position changes- treatment with meclizine and nasal spray     Family History   Problem Relation Age of Onset   • Colon cancer Paternal Grandfather    • Diabetes Mother    • Heart attack Father      Past Surgical History:   Procedure Laterality Date   •  SECTION     • CHOLECYSTECTOMY     • DILATION AND EVACUATION     • ENDOMETRIAL ABLATION     • GALLBLADDER SURGERY     • HERNIA REPAIR     • HYSTERECTOMY     • KNEE ARTHROSCOPY Right     repair torn meniscus   • TONSILLECTOMY       Social History     Social History   • Marital status:      Spouse name: N/A   • Number of children: N/A   • Years of education: N/A     Occupational History   • Not on file.     Social History Main Topics   • Smoking status: Never Smoker   • Smokeless tobacco: Never Used   • Alcohol use Yes      Comment: ocasional   • Drug use: No   • Sexual activity: Not on file      Comment:      Other Topics Concern   • Not on file     Social History Narrative   • No narrative on file     Allergies   Allergen Reactions   • Sulfa (Sulfonamide Antibiotics)      Other reaction(s): Unknown   • Wellbutrin [Bupropion Hcl]      Other reaction(s): rash   • Augmentin [Amoxicillin-Pot Clavulanate] Rash       Objective   Vitals:    18 1335   BP: 132/80   Pulse: 76   Resp: 18   Weight: 96.8 kg (213 lb 6.4 oz)   Height: 1.549 m (5' 1\")       Physical Exam   Constitutional: She is oriented to person, place, and time. She appears well-developed. No distress.   HENT:   Head: Atraumatic.   Eyes: EOM are normal.   Neck: No thyromegaly present.   Pulmonary/Chest: Effort normal.   Abdominal: Soft.   Musculoskeletal: She exhibits no edema.   Neurological: She is oriented to person, place, and time.   Skin: Skin is " warm.   Psychiatric: She has a normal mood and affect.       Assessment/Plan       Type 2 diabetes mellitus without complication (CMS/HCC) (HCC)  Diabetes mellitus type 2 stable A1c  Reviewed all diabetic blood testing results  Discussed home glucose and A1c goals  Rec ophtho, dental, podiatry, nutrition visits  Rec low consistent carb diet outlined, regular exercise, weight loss  Check testing 4 times per day  Metformin to 1000 mg BID  trulicity 1.5 mg weekly  Acarbose 50 mg pre meal  Discussed med side effects and patient accepts risks  Discussed hypoglycemia symptoms and treatment  No driving if glucose less than 100  Check testing for next visit  Spent 30 min with pt care greater than 50% of time spent counseling/coordinating care, counseling as outlined above  Will make attempt to obtain records from primary physician and relay recommendations to primary physician            Dyslipidemia  Dyslipidemia improved at goal but LFT elevated  Stop fenofibrate  Continue simvastatin 10 mg daily  3 month LFT and FLP check  Discussed med side effects      Essential hypertension  HTN at goal  Quinapril 15 mg daily  Discussed med side effects  Check yearly urine klever Samson MD  8/16/2018

## 2018-09-14 DIAGNOSIS — E11.9 TYPE 2 DIABETES MELLITUS WITHOUT COMPLICATION, WITHOUT LONG-TERM CURRENT USE OF INSULIN (CMS/HCC): Primary | ICD-10-CM

## 2018-09-14 RX ORDER — ACARBOSE 50 MG/1
TABLET ORAL
Qty: 90 TABLET | Refills: 11 | Status: SHIPPED | OUTPATIENT
Start: 2018-09-14 | End: 2019-09-06 | Stop reason: SDUPTHER

## 2018-09-25 RX ORDER — MECLIZINE HYDROCHLORIDE 25 MG/1
25 TABLET ORAL 2 TIMES DAILY PRN
Qty: 60 TABLET | Refills: 4 | Status: SHIPPED | OUTPATIENT
Start: 2018-09-25 | End: 2021-01-12

## 2019-01-03 DIAGNOSIS — E11.9 TYPE 2 DIABETES MELLITUS WITHOUT COMPLICATION, UNSPECIFIED WHETHER LONG TERM INSULIN USE (CMS/HCC): Primary | ICD-10-CM

## 2019-01-03 RX ORDER — LANCETS
EACH MISCELLANEOUS
Qty: 150 EACH | Refills: 11 | Status: SHIPPED | OUTPATIENT
Start: 2019-01-03 | End: 2020-09-03 | Stop reason: SDUPTHER

## 2019-01-03 NOTE — TELEPHONE ENCOUNTER
Pharmacy requesting rx refill for contour next strips and microlet lancets, rx sent to [facility] in [place].

## 2019-01-25 ENCOUNTER — RX ONLY (RX ONLY)
Age: 64
End: 2019-01-25

## 2019-01-25 RX ORDER — TRETINOIN 0.4 MG/G
GEL TOPICAL
Qty: 1 | Refills: 6 | Status: ERX | COMMUNITY
Start: 2019-01-25

## 2019-02-11 DIAGNOSIS — E78.2 MIXED HYPERLIPIDEMIA: ICD-10-CM

## 2019-02-11 LAB
ALBUMIN SERPL-MCNC: 4.1 G/DL (ref 3.6–5.1)
ALBUMIN/GLOB SERPL: 1.5 (CALC) (ref 1–2.5)
ALP SERPL-CCNC: 53 U/L (ref 33–130)
ALT SERPL-CCNC: 69 U/L (ref 6–29)
AST SERPL-CCNC: 39 U/L (ref 10–35)
BILIRUB SERPL-MCNC: 0.5 MG/DL (ref 0.2–1.2)
BUN SERPL-MCNC: 21 MG/DL (ref 7–25)
BUN/CREAT SERPL: ABNORMAL (CALC) (ref 6–22)
CALCIUM SERPL-MCNC: 9.5 MG/DL (ref 8.6–10.4)
CHLORIDE SERPL-SCNC: 106 MMOL/L (ref 98–110)
CHOLEST SERPL-MCNC: 176 MG/DL
CHOLEST/HDLC SERPL: 3.9 (CALC)
CO2 SERPL-SCNC: 24 MMOL/L (ref 20–32)
CREAT SERPL-MCNC: 0.8 MG/DL (ref 0.5–0.99)
GLOBULIN SER CALC-MCNC: 2.8 G/DL (CALC) (ref 1.9–3.7)
GLUCOSE SERPL-MCNC: 131 MG/DL (ref 65–99)
HBA1C MFR BLD: 6.6 % OF TOTAL HGB
HDLC SERPL-MCNC: 45 MG/DL
LDLC SERPL CALC-MCNC: 105 MG/DL (CALC)
NONHDLC SERPL-MCNC: 131 MG/DL (CALC)
POTASSIUM SERPL-SCNC: 4.4 MMOL/L (ref 3.5–5.3)
PROT SERPL-MCNC: 6.9 G/DL (ref 6.1–8.1)
QUEST EGFR NON-AFR. AMERICAN: 79 ML/MIN/1.73M2
SODIUM SERPL-SCNC: 139 MMOL/L (ref 135–146)
TRIGL SERPL-MCNC: 147 MG/DL

## 2019-02-11 RX ORDER — SIMVASTATIN 10 MG/1
10 TABLET, FILM COATED ORAL NIGHTLY
Qty: 90 TABLET | Refills: 3 | Status: SHIPPED | OUTPATIENT
Start: 2019-02-11 | End: 2019-08-13

## 2019-02-12 ENCOUNTER — RX ONLY (RX ONLY)
Age: 64
End: 2019-02-12

## 2019-02-12 RX ORDER — TRETINOIN 0.6 MG/G
GEL TOPICAL
Qty: 1 | Refills: 4 | Status: ERX | COMMUNITY
Start: 2019-02-12

## 2019-02-18 ENCOUNTER — TELEPHONE (OUTPATIENT)
Dept: ENDOCRINOLOGY | Facility: CLINIC | Age: 64
End: 2019-02-18

## 2019-02-18 NOTE — TELEPHONE ENCOUNTER
Spoke with pt  Relayed results  Attempted to make f/u be for pt.   Pt insisted on a 8:40 be but time slots are completed scheduled until about august/ September.   Pt stated she will call me back

## 2019-02-18 NOTE — TELEPHONE ENCOUNTER
----- Message from Francisco Samson MD sent at 2/16/2019  9:41 PM EST -----  pls let pt know her A1c improved to 6.6  Needs f/u 20 min in 3 months with testing

## 2019-02-19 RX ORDER — ESTRADIOL 10 UG/1
10 INSERT VAGINAL 2 TIMES WEEKLY
Qty: 24 TABLET | Refills: 0 | Status: SHIPPED | OUTPATIENT
Start: 2019-02-21 | End: 2019-05-10

## 2019-02-26 ENCOUNTER — TELEPHONE (OUTPATIENT)
Dept: ENDOCRINOLOGY | Facility: CLINIC | Age: 64
End: 2019-02-26

## 2019-03-04 ENCOUNTER — TELEPHONE (OUTPATIENT)
Dept: ENDOCRINOLOGY | Facility: CLINIC | Age: 64
End: 2019-03-04

## 2019-03-04 NOTE — TELEPHONE ENCOUNTER
Returned pt's call, left marielena at [phone]  Had previously spoken to pt on 2/18 re: lab results  At that time, pt was upset that office could not offer her an 8:40am be.

## 2019-03-04 NOTE — TELEPHONE ENCOUNTER
Pt called stating that this is her 3rd time requesting her blood work results to be mailed out to her  She stated she has every right to her results and she paid for them.     This is the first time hearing of this request.     Apologized to pt, verified address, blood work results from February mailed to pt.

## 2019-03-11 ENCOUNTER — OFFICE VISIT (OUTPATIENT)
Dept: OBSTETRICS AND GYNECOLOGY | Facility: CLINIC | Age: 64
End: 2019-03-11
Payer: COMMERCIAL

## 2019-03-11 ENCOUNTER — TELEPHONE (OUTPATIENT)
Dept: OBSTETRICS AND GYNECOLOGY | Facility: CLINIC | Age: 64
End: 2019-03-11

## 2019-03-11 VITALS — BODY MASS INDEX: 41.57 KG/M2 | DIASTOLIC BLOOD PRESSURE: 82 MMHG | WEIGHT: 220 LBS | SYSTOLIC BLOOD PRESSURE: 140 MMHG

## 2019-03-11 DIAGNOSIS — Z12.4 ROUTINE CERVICAL SMEAR: ICD-10-CM

## 2019-03-11 DIAGNOSIS — Z12.39 SCREENING BREAST EXAMINATION: ICD-10-CM

## 2019-03-11 DIAGNOSIS — Z12.39 SCREENING BREAST EXAMINATION: Primary | ICD-10-CM

## 2019-03-11 DIAGNOSIS — Z11.51 SPECIAL SCREENING EXAMINATION FOR HUMAN PAPILLOMAVIRUS (HPV): ICD-10-CM

## 2019-03-11 DIAGNOSIS — Z01.419 PAP SMEAR, LOW-RISK: Primary | ICD-10-CM

## 2019-03-11 PROCEDURE — 99396 PREV VISIT EST AGE 40-64: CPT | Performed by: OBSTETRICS & GYNECOLOGY

## 2019-03-11 NOTE — TELEPHONE ENCOUNTER
I have the script ordered for her mammogram I will call her tomorrow if she wants me to fax it or mail it

## 2019-03-11 NOTE — TELEPHONE ENCOUNTER
PT IS REQUESTING A SCRIPT FOR A ROUTINE MAMMO.  PLEASE MAIL SCRIPT, SHE IS HAVING OUTSIDE [facility]

## 2019-03-11 NOTE — PROGRESS NOTES
3/11/2019  Citlali Gifford is a 63 y.o. female who presents for annual exam.     HPI         Routine exam / discussed weight loss issues     Review of Systems    Mood- good   Heart/Lung 2015 calcium index 0- repeat 2020, heart murmur followed every year [facility]     Breast- no issues   Urine/GI- no issue  Menopause- no issues  Menstruation-  2015 hysterectomy     Current contraception:   Last Pap Test: TAHBSO ovarian hypertshthosis ( to much Testerone )  Last Mammogram: 5/2018  Last Dexascan:  Last Colonoscopy: polyps every 3 years   Last Routine Labs: hypertension, diabetes, high cholesterol PCP     Family History   Problem Relation Age of Onset   • Colon cancer Paternal Grandfather    • Diabetes Mother    • Heart attack Father        Family history of: MCousin PCousin breast cancer,  MGF Colon Cancer     Family history of heart disease:  Father     Review of Systems and Physical Exam  The following have been reviewed and updated as appropriate in this visit:      /82   Wt 99.8 kg (220 lb)   BMI 41.57 kg/m²     OBGyn Exam    Neck supple   Breast non tender no masses  Uterus  Mid position no masses  Abdomen normal no masses  Thyroid normal       Assessment and Plan    Imvexxy   Mammogram   Colonoscopy very 3 years   Gave number to Dr Villatoro weight loss  Discussed Saxenda     No orders of the defined types were placed in this encounter.      Diagnoses and all orders for this visit:    Screening breast examination  -     Cytology, Thinprep Pap  -     BI SCREENING MAMMOGRAM BILATERAL; Future    .  No Follow-up on file.  Aida Etienne MA    I, Aida Etienne MA, am scribing for, and in the presence of, Griffin Herrera MD.    3/11/2019 5:07 PM

## 2019-03-13 LAB
CLINICAL INFO: NORMAL
CYTO CVX: NORMAL
CYTOLOGIST CVX/VAG CYTO: NORMAL
CYTOLOGY CMNT CVX/VAG CYTO-IMP: NORMAL
DATE OF PREVIOUS PAP: NORMAL
DATE PREVIOUS BX: NORMAL
HPV E6+E7 MRNA CVX QL NAA+PROBE: NOT DETECTED
LMP START DATE: NORMAL
QUEST COMMENT (PAP): NORMAL
SPECIMEN SOURCE CVX/VAG CYTO: NORMAL
STAT OF ADQ CVX/VAG CYTO-IMP: NORMAL

## 2019-04-26 DIAGNOSIS — E11.9 TYPE 2 DIABETES MELLITUS WITHOUT COMPLICATION, WITHOUT LONG-TERM CURRENT USE OF INSULIN (CMS/HCC): ICD-10-CM

## 2019-04-26 RX ORDER — DULAGLUTIDE 1.5 MG/.5ML
1.5 INJECTION, SOLUTION SUBCUTANEOUS WEEKLY
Qty: 4 PEN | Refills: 4 | Status: SHIPPED | OUTPATIENT
Start: 2019-04-26 | End: 2020-01-31

## 2019-04-26 NOTE — TELEPHONE ENCOUNTER
Pt last seen on 8/16  F/u be on 4/29  Labs on 2/9 a1c 6.6  [facility] pharmacy requesting rx refill for trulicity, rx sent.

## 2019-04-29 ENCOUNTER — OFFICE VISIT (OUTPATIENT)
Dept: ENDOCRINOLOGY | Facility: CLINIC | Age: 64
End: 2019-04-29
Payer: COMMERCIAL

## 2019-04-29 VITALS
OXYGEN SATURATION: 98 % | BODY MASS INDEX: 40.59 KG/M2 | SYSTOLIC BLOOD PRESSURE: 104 MMHG | DIASTOLIC BLOOD PRESSURE: 70 MMHG | WEIGHT: 215 LBS | RESPIRATION RATE: 18 BRPM | HEIGHT: 61 IN | HEART RATE: 77 BPM

## 2019-04-29 DIAGNOSIS — Z79.4 TYPE 2 DIABETES MELLITUS WITHOUT COMPLICATION, WITH LONG-TERM CURRENT USE OF INSULIN (CMS/HCC): Primary | ICD-10-CM

## 2019-04-29 DIAGNOSIS — E11.9 TYPE 2 DIABETES MELLITUS WITHOUT COMPLICATION, WITH LONG-TERM CURRENT USE OF INSULIN (CMS/HCC): Primary | ICD-10-CM

## 2019-04-29 DIAGNOSIS — E78.2 MIXED HYPERLIPIDEMIA: ICD-10-CM

## 2019-04-29 DIAGNOSIS — I10 ESSENTIAL HYPERTENSION: ICD-10-CM

## 2019-04-29 LAB — GLUCOSE BLOOD, POC: 146

## 2019-04-29 PROCEDURE — 82962 GLUCOSE BLOOD TEST: CPT

## 2019-04-29 PROCEDURE — 99214 OFFICE O/P EST MOD 30 MIN: CPT

## 2019-04-29 ASSESSMENT — ENCOUNTER SYMPTOMS
POLYDIPSIA: 0
TROUBLE SWALLOWING: 0
DIZZINESS: 0
ABDOMINAL DISTENTION: 0
MYALGIAS: 0
CHEST TIGHTNESS: 0
PSYCHIATRIC NEGATIVE: 1
UNEXPECTED WEIGHT CHANGE: 0

## 2019-04-29 NOTE — PROGRESS NOTES
Daily Progress Note           Patient ID: Citlali Gifford is a 63 y.o. female.      HPI 62 year old female with diabetes mellitus, HTN, obesity, presents for follow up visit.     Reviewed pmhx/med rec.  Checking glucose a few times per week, 120-135.  Taking:  Metformin to 1000 mg BID  Trulicity 1.5 mg weekly  Acarbose 50 mg pre meal twice day     No overactive bowels.    Weight stable.    She notes fatigue unchanged.    Walks 20 minutes per day.    Stopped jardiance in past with vaginal yeast infection, no recent infections.     No changes in vision.  Sees eye doctor yearly, no diabetic retinopathy reported by patient.     Has HTN, taking quinapril 15 mg daily.  No light headedness or dizziness.     Has dyslipidemia, simvastatin 10 mg daily.  No muscle aches or pains.     No pancreatitis, no thyroid cancer.     Mother has DM2.     Diagnosed with DM2 3 years ago on screening with increased thirst/urination.     2/11/17  cr/gfr 0.7/>60  A1c 7.2  /44/101/163     9/19/17  cr/gfr 0.9/>60  A1c 6.7  LFT 58/54     3/22/18  A1c 6.6  /41/106/181  Cr/gfr 0.9/>60  Urine mc 8  LFT 35/63     8/14/18  A1c 6.7  LFT 49/80  Cr/gfr 0.8/>60  /41/91/134    2/9/19  A1c 6.6  Cr/gfr 0.8/>60  LFT 39/69  /45/105/147     The following have been reviewed and updated as appropriate in this visit:    Review of Systems   Constitutional: Negative for unexpected weight change.   HENT: Negative for trouble swallowing.    Eyes: Negative for visual disturbance.   Respiratory: Negative for chest tightness.    Cardiovascular: Negative for chest pain.   Gastrointestinal: Negative for abdominal distention.   Endocrine: Negative for polydipsia and polyuria.   Musculoskeletal: Negative for myalgias.   Skin: Negative for rash.   Neurological: Negative for dizziness.   Psychiatric/Behavioral: Negative.              Current Outpatient Prescriptions   Medication Sig Dispense Refill   • acarbose (PRECOSE) 50 mg tablet TAKE 1  TABLET BY ORAL ROUTE 3 TIMES EVERY DAY AT THE START (WITH THE FIRST BITE) OF EACH MAIN MEAL 90 tablet 11   • aspirin (ASPIR-81) 81 mg enteric coated tablet take 1 tablet by oral route  every day     • azelastine (ASTELIN) 137 mcg (0.1 %) nasal spray Administer 1 spray into each nostril daily. Use in each nostril as directed.     • b complex vitamins tablet No SIG Entered     • blood sugar diagnostic (CONTOUR NEXT TEST STRIPS) strip Check 4 times a day 150 strip 11   • cholecalciferol, vitamin D3, (VITAMIN D3) 1,000 unit capsule One tablet daily     • estradiol (IMVEXXY STARTER PACK) 10 mcg insert, dose pack Insert 10 mcg into the vagina daily. 38 each 6   • lancets (MICROLET LANCET) misc Check blood glucose 4 times a day 150 each 11   • metFORMIN (GLUCOPHAGE) 500 mg tablet Take 2 tablets (1,000 mg total) by mouth 2 (two) times a day with meals. (Patient taking differently: Take 1,000 mg by mouth 3 (three) times a day.  ) 120 tablet 11   • omega-3 fatty acids-fish oil (omega-3) 300-1,000 mg capsule take 1 capsule daily     • quinapril (ACCUPRIL) 10 mg tablet Take 15 mg by mouth once daily. Take 1 and 1/2 tablet daily   5   • sertraline (ZOLOFT) 100 mg tablet Take 100 mg by mouth every evening.  2   • simvastatin (ZOCOR) 10 mg tablet Take 1 tablet (10 mg total) by mouth nightly. 90 tablet 3   • TRULICITY 1.5 mg/0.5 mL pen injector INJECT 0.5 ML (1.5 MG TOTAL) UNDER THE SKIN ONCE A WEEK. 4 pen 4   • estradiol (YUVAFEM) 10 mcg tablet Insert 1 tablet (10 mcg total) into the vagina 2 (two) times a week (Mon, Thu). (Patient not taking: Reported on 4/29/2019 ) 24 tablet 0   • LORazepam (ATIVAN) 0.5 mg tablet Take 1 tablet (0.5 mg total) by mouth once for 1 dose. Before MRI 2 tablet 0   • meclizine (ANTIVERT) 25 mg tablet Take 1 tablet (25 mg total) by mouth 2 (two) times a day as needed for dizziness. (Patient not taking: Reported on 3/11/2019 ) 60 tablet 4     No current facility-administered medications for this visit.   "    Past Medical History:   Diagnosis Date   • Anxiety    • Arthritis    • Claustrophobia    • Elevated liver enzymes     dx-  fatty liver   • Hypertension    • Impaired hearing     bilateral - no hearing aids   • Lipid disorder    • Numbness     left foot- secondary to previous  back injury    • Postmenopausal    • Sleep apnea     compliant with CPAP- nightly   • Tendonitis     left shoulder - treatment with PT   • Type 2 diabetes mellitus (CMS/HCC) (HCC)    • Vertigo     ongoing- intermittent with position changes- treatment with meclizine and nasal spray     Family History   Problem Relation Age of Onset   • Colon cancer Paternal Grandfather    • Diabetes Mother    • Heart attack Father      Past Surgical History:   Procedure Laterality Date   •  SECTION     • CHOLECYSTECTOMY     • DILATION AND EVACUATION     • ENDOMETRIAL ABLATION     • GALLBLADDER SURGERY     • HERNIA REPAIR     • HYSTERECTOMY     • KNEE ARTHROSCOPY Right     repair torn meniscus   • TONSILLECTOMY       Social History     Social History   • Marital status:      Spouse name: N/A   • Number of children: N/A   • Years of education: N/A     Occupational History   • Not on file.     Social History Main Topics   • Smoking status: Never Smoker   • Smokeless tobacco: Never Used   • Alcohol use Yes      Comment: ocasional   • Drug use: No   • Sexual activity: Not on file      Comment:      Other Topics Concern   • Not on file     Social History Narrative   • No narrative on file     Allergies   Allergen Reactions   • Augmentin [Amoxicillin-Pot Clavulanate] Rash   • Sulfa (Sulfonamide Antibiotics) Rash     Other reaction(s): Unknown   • Wellbutrin [Bupropion Hcl] Rash     Other reaction(s): rash     Vitals:    19 1411   BP: 104/70   BP Location: Left upper arm   Patient Position: Sitting   Pulse: 77   Resp: 18   SpO2: 98%   Weight: 97.5 kg (215 lb)   Height: 1.549 m (5' 1\")         Objective     Physical Exam   Constitutional: " She is oriented to person, place, and time. She appears well-developed. No distress.   HENT:   Head: Atraumatic.   Eyes: EOM are normal.   Neck: No thyromegaly present.   Pulmonary/Chest: Effort normal.   Abdominal: She exhibits no distension.   Musculoskeletal: She exhibits no edema.   Neurological: She is oriented to person, place, and time.   Skin: Skin is warm.   Psychiatric: She has a normal mood and affect.   Intact sensation feet bilateral to fine filament      Assessment/Plan     Type 2 diabetes mellitus without complication (CMS/[facility]) ([facility])  Diabetes mellitus type 2 improved control still not at goal  Reviewed all diabetic blood testing results  Discussed home glucose and A1c goals  Rec ophtho, dental, podiatry, nutrition visits  Rec low consistent carb diet outlined, regular exercise, weight loss  Check testing 1 times per day  Metformin to 1000 mg BID  Trulicity 1.5 mg weekly  Acarbose 50 mg pre meal twice day  Discussed med side effects and patient accepts risks  Discussed hypoglycemia symptoms and treatment  No driving if glucose less than 100  Check testing for next visit  Spent 30 min with pt care greater than 50% of time spent counseling/coordinating care, counseling as outlined above  Will make attempt to obtain records from primary physician and relay recommendations to primary physician            Essential hypertension  HTN at goal  Quinapril 10 mg daily  Discussed med side effects  Check yearly urine mc      Dyslipidemia  Dyslipidemia near goal, higher LDL last check  Simvastatin 10 mg daily  Yearly FLP check  Discussed med side effects              Francisco Samson MD  4/29/2019

## 2019-04-29 NOTE — ASSESSMENT & PLAN NOTE
Dyslipidemia near goal, higher LDL last check  Simvastatin 10 mg daily  Yearly FLP check  Discussed med side effects

## 2019-04-29 NOTE — ASSESSMENT & PLAN NOTE
Diabetes mellitus type 2 improved control still not at goal  Reviewed all diabetic blood testing results  Discussed home glucose and A1c goals  Rec ophtho, dental, podiatry, nutrition visits  Rec low consistent carb diet outlined, regular exercise, weight loss  Check testing 1 times per day  Metformin to 1000 mg BID  Trulicity 1.5 mg weekly  Acarbose 50 mg pre meal twice day  Discussed med side effects and patient accepts risks  Discussed hypoglycemia symptoms and treatment  No driving if glucose less than 100  Check testing for next visit  Spent 30 min with pt care greater than 50% of time spent counseling/coordinating care, counseling as outlined above  Will make attempt to obtain records from primary physician and relay recommendations to primary physician

## 2019-05-10 RX ORDER — ESTRADIOL 10 UG/1
10 INSERT VAGINAL 2 TIMES WEEKLY
Qty: 24 TABLET | Refills: 6 | Status: SHIPPED | OUTPATIENT
Start: 2019-05-13 | End: 2021-01-12

## 2019-06-20 ENCOUNTER — TELEPHONE (OUTPATIENT)
Dept: OBSTETRICS AND GYNECOLOGY | Facility: CLINIC | Age: 64
End: 2019-06-20

## 2019-06-26 ENCOUNTER — OFFICE VISIT (OUTPATIENT)
Dept: CARDIOLOGY | Facility: CLINIC | Age: 64
End: 2019-06-26
Payer: COMMERCIAL

## 2019-06-26 ENCOUNTER — TELEPHONE (OUTPATIENT)
Dept: CARDIOLOGY | Facility: CLINIC | Age: 64
End: 2019-06-26

## 2019-06-26 VITALS
WEIGHT: 214.4 LBS | DIASTOLIC BLOOD PRESSURE: 76 MMHG | TEMPERATURE: 98.6 F | HEART RATE: 88 BPM | HEIGHT: 61 IN | SYSTOLIC BLOOD PRESSURE: 144 MMHG | OXYGEN SATURATION: 98 % | BODY MASS INDEX: 40.48 KG/M2 | RESPIRATION RATE: 14 BRPM

## 2019-06-26 DIAGNOSIS — I35.0 NONRHEUMATIC AORTIC VALVE STENOSIS: ICD-10-CM

## 2019-06-26 DIAGNOSIS — E11.9 TYPE 2 DIABETES MELLITUS WITHOUT COMPLICATION, UNSPECIFIED WHETHER LONG TERM INSULIN USE (CMS/HCC): Primary | ICD-10-CM

## 2019-06-26 PROCEDURE — 93000 ELECTROCARDIOGRAM COMPLETE: CPT | Performed by: INTERNAL MEDICINE

## 2019-06-26 PROCEDURE — 99214 OFFICE O/P EST MOD 30 MIN: CPT | Performed by: INTERNAL MEDICINE

## 2019-06-26 RX ORDER — QUINAPRIL 10 MG/1
20 TABLET ORAL
Qty: 90 TABLET | Refills: 3 | Status: SHIPPED | OUTPATIENT
Start: 2019-06-26 | End: 2019-06-28 | Stop reason: SDUPTHER

## 2019-06-26 ASSESSMENT — ENCOUNTER SYMPTOMS
DYSPNEA ON EXERTION: 1
EYES NEGATIVE: 1
CONSTITUTIONAL NEGATIVE: 1
HEMATOLOGIC/LYMPHATIC NEGATIVE: 1
RESPIRATORY NEGATIVE: 1
MUSCULOSKELETAL NEGATIVE: 1
ENDOCRINE NEGATIVE: 1
GASTROINTESTINAL NEGATIVE: 1
NEUROLOGICAL NEGATIVE: 1
PSYCHIATRIC NEGATIVE: 1

## 2019-06-26 NOTE — TELEPHONE ENCOUNTER
Test scheduled for 1/20/20 @ 8:00a        Precert Request for:           Transthoracic echo (TTE) complete          Diagnosis:  Nonrheumatic aortic valve stenosis (I35.0)           Location: [facility] [place]

## 2019-06-26 NOTE — PROGRESS NOTES
Chief Complaint: Mrs. Gifford today on for her hypertension diabetes and being overweight.  She denies any form of chest discomfort but has shortness of breath climbing stairs and exerting herself, she denies chest discomfort or shortness of breath with anxiety, cold weather, postprandially, with sex, at rest, or nocturnally.  She gets short of breath climbing stairs, she denies proximal nocturnal dyspnea, orthopnea, palpitations, syncope she has rare intermittent lower extremity edema that goes down by the next day and she has nocturia.       Medications:  Current Outpatient Prescriptions   Medication Sig Dispense Refill   • acarbose (PRECOSE) 50 mg tablet TAKE 1 TABLET BY ORAL ROUTE 3 TIMES EVERY DAY AT THE START (WITH THE FIRST BITE) OF EACH MAIN MEAL 90 tablet 11   • aspirin (ASPIR-81) 81 mg enteric coated tablet take 1 tablet by oral route  every day     • azelastine (ASTELIN) 137 mcg (0.1 %) nasal spray Administer 1 spray into each nostril daily. Use in each nostril as directed.     • b complex vitamins tablet No SIG Entered     • blood sugar diagnostic (CONTOUR NEXT TEST STRIPS) strip Check 4 times a day 150 strip 11   • cholecalciferol, vitamin D3, (VITAMIN D3) 1,000 unit capsule One tablet daily     • estradiol (IMVEXXY STARTER PACK) 10 mcg insert, dose pack Insert 10 mcg into the vagina daily. 38 each 6   • estradiol (YUVAFEM) 10 mcg tablet Insert 1 tablet (10 mcg total) into the vagina 2 (two) times a week (Mon, Thu). 24 tablet 6   • lancets (MICROLET LANCET) misc Check blood glucose 4 times a day 150 each 11   • LORazepam (ATIVAN) 0.5 mg tablet Take 1 tablet (0.5 mg total) by mouth once for 1 dose. Before MRI 2 tablet 0   • meclizine (ANTIVERT) 25 mg tablet Take 1 tablet (25 mg total) by mouth 2 (two) times a day as needed for dizziness. (Patient not taking: Reported on 3/11/2019 ) 60 tablet 4   • omega-3 fatty acids-fish oil (omega-3) 300-1,000 mg capsule take 1 capsule daily     • quinapril  (ACCUPRIL) 10 mg tablet Take 15 mg by mouth once daily. Take 1 and 1/2 tablet daily   5   • sertraline (ZOLOFT) 100 mg tablet Take 100 mg by mouth every evening.  2   • simvastatin (ZOCOR) 10 mg tablet Take 1 tablet (10 mg total) by mouth nightly. 90 tablet 3   • TRULICITY 1.5 mg/0.5 mL pen injector INJECT 0.5 ML (1.5 MG TOTAL) UNDER THE SKIN ONCE A WEEK. 4 pen 4     No current facility-administered medications for this visit.        Review of Systems:  Review of Systems   Constitution: Negative.   HENT: Negative.    Eyes: Negative.    Cardiovascular: Positive for dyspnea on exertion and leg swelling.   Respiratory: Negative.    Endocrine: Negative.    Hematologic/Lymphatic: Negative.    Skin: Negative.    Musculoskeletal: Negative.    Gastrointestinal: Negative.    Genitourinary: Positive for nocturia.   Neurological: Negative.    Psychiatric/Behavioral: Negative.    Allergic/Immunologic: Positive for environmental allergies.       Physical Exam:  Vitals:    06/26/19 1023   BP: (!) 144/76   Pulse: 88   Resp: 14   Temp: 37 °C (98.6 °F)   SpO2: 98%     Physical Exam   Constitutional: She is oriented to person, place, and time.   overweight   HENT:   Head: Normocephalic and atraumatic.   Eyes: Pupils are equal, round, and reactive to light. Conjunctivae and EOM are normal.   Neck:   Bilateral bruits   Cardiovascular:   Murmur (2/6 systolic murmur is AS) heard.  Pulmonary/Chest: Effort normal and breath sounds normal.   Abdominal: Soft. Bowel sounds are normal.   Musculoskeletal: Normal range of motion.   Neurological: She is alert and oriented to person, place, and time. She has normal reflexes.   Skin: Skin is warm and dry.   Psychiatric: She has a normal mood and affect. Her behavior is normal. Judgment and thought content normal.   Vitals reviewed.    EKG: SR YFN Barclay    Assessment and Plan:  Impression:  Dyspnea.  Mild aortic stenosis.  Hypertension not quite controlled.  Diabetes mellitus type  2.  Overweight.  Anxiety.  Recommendation:  Her blood pressure was 144/76 today and her being diabetic I would like to see her closer to 120 than 1930 so I increased her Accupril from 15 mg a day to 20 mg daily this will also help protect her kidney and decrease any proteinuria.  I spoke to her length about diet and exercise I talked to her about weight loss and I explained to her why she needs the weight loss and what she is looking at in terms of aggressive atherosclerosis aortic stenosis etc. we will see her in 6 months time to see how she is doing we will also do a repeat echo because will be 18 months since the last echo to follow her aortic stenosis in my thank you for the opportunity seeing such nice woman today.    Lupillo Cooley, DO

## 2019-06-28 ENCOUNTER — TELEPHONE (OUTPATIENT)
Dept: SCHEDULING | Facility: CLINIC | Age: 64
End: 2019-06-28

## 2019-06-28 RX ORDER — QUINAPRIL 10 MG/1
20 TABLET ORAL
Qty: 180 TABLET | Refills: 3 | Status: SHIPPED | OUTPATIENT
Start: 2019-06-28 | End: 2021-09-16

## 2019-07-10 DIAGNOSIS — E11.9 TYPE 2 DIABETES MELLITUS WITHOUT COMPLICATION, WITHOUT LONG-TERM CURRENT USE OF INSULIN (CMS/HCC): ICD-10-CM

## 2019-07-12 RX ORDER — METFORMIN HYDROCHLORIDE 500 MG/1
TABLET ORAL
Qty: 360 TABLET | Refills: 3 | Status: SHIPPED | OUTPATIENT
Start: 2019-07-12 | End: 2020-05-21

## 2019-08-09 LAB
ALBUMIN SERPL-MCNC: 4.3 G/DL (ref 3.6–5.1)
ALBUMIN/CREAT UR: 6 MCG/MG CREAT
ALBUMIN/GLOB SERPL: 1.7 (CALC) (ref 1–2.5)
ALP SERPL-CCNC: 65 U/L (ref 33–130)
ALT SERPL-CCNC: 39 U/L (ref 6–29)
AST SERPL-CCNC: 20 U/L (ref 10–35)
BILIRUB SERPL-MCNC: 0.4 MG/DL (ref 0.2–1.2)
BUN SERPL-MCNC: 12 MG/DL (ref 7–25)
BUN/CREAT SERPL: ABNORMAL (CALC) (ref 6–22)
CALCIUM SERPL-MCNC: 9.6 MG/DL (ref 8.6–10.4)
CHLORIDE SERPL-SCNC: 102 MMOL/L (ref 98–110)
CHOLEST SERPL-MCNC: 199 MG/DL
CHOLEST/HDLC SERPL: 3.3 (CALC)
CO2 SERPL-SCNC: 25 MMOL/L (ref 20–32)
CREAT SERPL-MCNC: 0.91 MG/DL (ref 0.5–0.99)
CREAT UR-MCNC: 139 MG/DL (ref 20–275)
GLOBULIN SER CALC-MCNC: 2.6 G/DL (CALC) (ref 1.9–3.7)
GLUCOSE SERPL-MCNC: 84 MG/DL (ref 65–99)
HBA1C MFR BLD: 6.5 % OF TOTAL HGB
HDLC SERPL-MCNC: 60 MG/DL
LDLC SERPL CALC-MCNC: 122 MG/DL (CALC)
MICROALBUMIN UR-MCNC: 0.9 MG/DL
NONHDLC SERPL-MCNC: 139 MG/DL (CALC)
POTASSIUM SERPL-SCNC: 4.5 MMOL/L (ref 3.5–5.3)
PROT SERPL-MCNC: 6.9 G/DL (ref 6.1–8.1)
QUEST EGFR NON-AFR. AMERICAN: 68 ML/MIN/1.73M2
SODIUM SERPL-SCNC: 138 MMOL/L (ref 135–146)
TRIGL SERPL-MCNC: 71 MG/DL

## 2019-08-13 ENCOUNTER — OFFICE VISIT (OUTPATIENT)
Dept: ENDOCRINOLOGY | Facility: CLINIC | Age: 64
End: 2019-08-13
Payer: COMMERCIAL

## 2019-08-13 VITALS
DIASTOLIC BLOOD PRESSURE: 80 MMHG | RESPIRATION RATE: 18 BRPM | HEART RATE: 94 BPM | HEIGHT: 61 IN | BODY MASS INDEX: 40.86 KG/M2 | WEIGHT: 216.4 LBS | OXYGEN SATURATION: 99 % | SYSTOLIC BLOOD PRESSURE: 126 MMHG

## 2019-08-13 DIAGNOSIS — I10 ESSENTIAL HYPERTENSION: ICD-10-CM

## 2019-08-13 DIAGNOSIS — E78.2 MIXED HYPERLIPIDEMIA: ICD-10-CM

## 2019-08-13 DIAGNOSIS — E11.9 TYPE 2 DIABETES MELLITUS WITHOUT COMPLICATION, WITH LONG-TERM CURRENT USE OF INSULIN (CMS/HCC): Primary | ICD-10-CM

## 2019-08-13 DIAGNOSIS — Z79.4 TYPE 2 DIABETES MELLITUS WITHOUT COMPLICATION, WITH LONG-TERM CURRENT USE OF INSULIN (CMS/HCC): Primary | ICD-10-CM

## 2019-08-13 LAB — GLUCOSE BLOOD, POC: 124

## 2019-08-13 PROCEDURE — 99214 OFFICE O/P EST MOD 30 MIN: CPT

## 2019-08-13 PROCEDURE — 82962 GLUCOSE BLOOD TEST: CPT

## 2019-08-13 RX ORDER — AZELASTINE HYDROCHLORIDE AND FLUTICASONE PROPIONATE 137; 50 UG/1; UG/1
1 SPRAY, METERED NASAL
Refills: 3 | COMMUNITY
Start: 2019-07-26 | End: 2021-01-12

## 2019-08-13 RX ORDER — SIMVASTATIN 20 MG/1
20 TABLET, FILM COATED ORAL NIGHTLY
Qty: 90 TABLET | Refills: 3 | Status: SHIPPED | OUTPATIENT
Start: 2019-08-13 | End: 2020-02-03 | Stop reason: SDUPTHER

## 2019-08-13 RX ORDER — ESTRADIOL 10 UG/1
INSERT VAGINAL
Refills: 6 | COMMUNITY
Start: 2019-07-24 | End: 2020-01-21 | Stop reason: SDUPTHER

## 2019-08-13 ASSESSMENT — ENCOUNTER SYMPTOMS
DIZZINESS: 0
MYALGIAS: 0
NAUSEA: 0
NECK PAIN: 0
POLYDIPSIA: 0
CHEST TIGHTNESS: 0
TROUBLE SWALLOWING: 0
PALPITATIONS: 0
DIARRHEA: 0
UNEXPECTED WEIGHT CHANGE: 0
PSYCHIATRIC NEGATIVE: 1

## 2019-08-13 NOTE — ASSESSMENT & PLAN NOTE
Dyslipidemia not at goal  Increase to simvastatin 20 mg daily    Yearly FLP check  Discussed med side effects

## 2019-08-13 NOTE — PROGRESS NOTES
Daily Progress Note           Patient ID: Citlali Gifford is a 63 y.o. female.    63 year old female with diabetes mellitus, HTN, obesity, presents for follow up visit.     Reviewed pmhx/med rec.    Taking:  Metformin to 1000 mg BID  Trulicity 1.5 mg weekly  Acarbose 50 mg pre meal twice day on occasion    Stopped jardiance in past with vaginal yeast infection, no recent infections.  No overactive bowels.  Weight stable.     Walks 20 minutes per day unchanged.     No changes in vision.  Sees eye doctor yearly but is due and wants to change physicians, no diabetic retinopathy reported by patient.     Has HTN, taking quinapril 15 mg daily.  No light headedness or dizziness.     Has dyslipidemia, simvastatin 10 mg daily.  No muscle aches or pains.     No pancreatitis, no thyroid cancer.     Mother has DM2.     Diagnosed with DM2 3 years ago on screening with increased thirst/urination.     2/11/17  cr/gfr 0.7/>60  A1c 7.2  /44/101/163     9/19/17  cr/gfr 0.9/>60  A1c 6.7  LFT 58/54     3/22/18  A1c 6.6  /41/106/181  Cr/gfr 0.9/>60  Urine mc 8  LFT 35/63     8/14/18  A1c 6.7  LFT 49/80  Cr/gfr 0.8/>60  /41/91/134     2/9/19  A1c 6.6  Cr/gfr 0.8/>60  LFT 39/69  /45/105/147    8/8/19  A1c 6.5  /60/122/71  Cr/gfr 0.9/>60  Urine m/c 6       The following have been reviewed and updated as appropriate in this visit:    Review of Systems   Constitutional: Negative for unexpected weight change.   HENT: Negative for trouble swallowing.    Eyes: Negative for visual disturbance.   Respiratory: Negative for chest tightness.    Cardiovascular: Negative for chest pain and palpitations.   Gastrointestinal: Negative for diarrhea and nausea.   Endocrine: Negative for polydipsia and polyuria.   Musculoskeletal: Negative for myalgias and neck pain.   Skin: Negative for rash.   Neurological: Negative for dizziness.   Psychiatric/Behavioral: Negative.              Current Outpatient Prescriptions    Medication Sig Dispense Refill   • acarbose (PRECOSE) 50 mg tablet TAKE 1 TABLET BY ORAL ROUTE 3 TIMES EVERY DAY AT THE START (WITH THE FIRST BITE) OF EACH MAIN MEAL 90 tablet 11   • aspirin (ASPIR-81) 81 mg enteric coated tablet take 1 tablet by oral route  every day     • azelastine (ASTELIN) 137 mcg (0.1 %) nasal spray Administer 1 spray into each nostril daily. Use in each nostril as directed.     • b complex vitamins tablet No SIG Entered     • blood sugar diagnostic (CONTOUR NEXT TEST STRIPS) strip Check 4 times a day 150 strip 11   • cholecalciferol, vitamin D3, (VITAMIN D3) 1,000 unit capsule One tablet daily     • estradiol (IMVEXXY STARTER PACK) 10 mcg insert, dose pack Insert 10 mcg into the vagina daily. 38 each 6   • estradiol (YUVAFEM) 10 mcg tablet Insert 1 tablet (10 mcg total) into the vagina 2 (two) times a week (Mon, Thu). 24 tablet 6   • lancets (MICROLET LANCET) misc Check blood glucose 4 times a day 150 each 11   • LORazepam (ATIVAN) 0.5 mg tablet Take 1 tablet (0.5 mg total) by mouth once for 1 dose. Before MRI 2 tablet 0   • meclizine (ANTIVERT) 25 mg tablet Take 1 tablet (25 mg total) by mouth 2 (two) times a day as needed for dizziness. (Patient not taking: Reported on 3/11/2019 ) 60 tablet 4   • metFORMIN (GLUCOPHAGE) 500 mg tablet TAKE 2 TABLETS BY MOUTH TWICE A DAY WITH A MEAL 360 tablet 3   • omega-3 fatty acids-fish oil (omega-3) 300-1,000 mg capsule take 1 capsule daily     • quinapril (ACCUPRIL) 10 mg tablet Take 2 tablets (20 mg total) by mouth once daily. 180 tablet 3   • sertraline (ZOLOFT) 100 mg tablet Take 100 mg by mouth every evening.  2   • simvastatin (ZOCOR) 10 mg tablet Take 1 tablet (10 mg total) by mouth nightly. 90 tablet 3   • TRULICITY 1.5 mg/0.5 mL pen injector INJECT 0.5 ML (1.5 MG TOTAL) UNDER THE SKIN ONCE A WEEK. 4 pen 4     No current facility-administered medications for this visit.      Past Medical History:   Diagnosis Date   • Anxiety    • Arthritis    •  "Claustrophobia    • Elevated liver enzymes     dx-  fatty liver   • Hypertension    • Impaired hearing     bilateral - no hearing aids   • Lipid disorder    • Numbness     left foot- secondary to previous  back injury    • Postmenopausal    • Sleep apnea     compliant with CPAP- nightly   • Tendonitis     left shoulder - treatment with PT   • Type 2 diabetes mellitus (CMS/HCC) (HCC)    • Vertigo     ongoing- intermittent with position changes- treatment with meclizine and nasal spray     Family History   Problem Relation Age of Onset   • Colon cancer Paternal Grandfather    • Diabetes Mother    • Heart attack Father      Past Surgical History:   Procedure Laterality Date   •  SECTION     • CHOLECYSTECTOMY     • DILATION AND EVACUATION     • ENDOMETRIAL ABLATION     • GALLBLADDER SURGERY     • HERNIA REPAIR     • HYSTERECTOMY     • KNEE ARTHROSCOPY Right     repair torn meniscus   • TONSILLECTOMY       Social History     Social History   • Marital status:      Spouse name: N/A   • Number of children: N/A   • Years of education: N/A     Occupational History   • Not on file.     Social History Main Topics   • Smoking status: Never Smoker   • Smokeless tobacco: Never Used   • Alcohol use Yes      Comment: ocasional   • Drug use: No   • Sexual activity: Not on file      Comment:      Other Topics Concern   • Not on file     Social History Narrative   • No narrative on file     Allergies   Allergen Reactions   • Augmentin [Amoxicillin-Pot Clavulanate] Rash   • Sulfa (Sulfonamide Antibiotics) Rash     Other reaction(s): Unknown   • Wellbutrin [Bupropion Hcl] Rash     Other reaction(s): rash     Vitals:    19 1104   BP: 126/80   Pulse: 94   Resp: 18   SpO2: 99%   Weight: 98.2 kg (216 lb 6.4 oz)   Height: 1.549 m (5' 1\")       Objective     Physical Exam   Constitutional: She is oriented to person, place, and time. She appears well-developed.   HENT:   Head: Atraumatic.   Eyes: EOM are normal. "   Neck: Neck supple.   Pulmonary/Chest: Effort normal.   Abdominal: She exhibits no distension.   Musculoskeletal: She exhibits no edema.   Neurological: She is oriented to person, place, and time.   Skin: Skin is warm.   Psychiatric: She has a normal mood and affect.   Intact sensation feet bilateral to fine filament      Assessment/Plan     Type 2 diabetes mellitus without complication (CMS/HCC) (HCC)  Diabetes mellitus type 2 improved control still not at goal  Reviewed all diabetic blood testing results  Discussed home glucose and A1c goals  Rec ophtho, dental, podiatry, nutrition visits  Rec low consistent carb diet outlined, regular exercise, weight loss  Check testing 3 times per day  Metformin to 1000 mg BID  Trulicity 1.5 mg weekly  Acarbose 50 mg pre meal twice day    Discussed med side effects and patient accepts risks  Discussed hypoglycemia symptoms and treatment  No driving if glucose less than 100  Check testing for next visit  Spent 30 min with pt care greater than 50% of time spent counseling/coordinating care, counseling as outlined above  Will make attempt to obtain records from primary physician and relay recommendations to primary physician            Dyslipidemia  Dyslipidemia not at goal  Increase to simvastatin 20 mg daily    Yearly FLP check  Discussed med side effects      Essential hypertension  HTN at goal  Quinapril 10 mg daily  Discussed med side effects  Check yearly urine klever Samson MD  8/13/2019

## 2019-08-13 NOTE — ASSESSMENT & PLAN NOTE
Diabetes mellitus type 2 improved control still not at goal  Reviewed all diabetic blood testing results  Discussed home glucose and A1c goals  Rec ophtho, dental, podiatry, nutrition visits  Rec low consistent carb diet outlined, regular exercise, weight loss  Check testing 3 times per day  Metformin to 1000 mg BID  Trulicity 1.5 mg weekly  Acarbose 50 mg pre meal twice day    Discussed med side effects and patient accepts risks  Discussed hypoglycemia symptoms and treatment  No driving if glucose less than 100  Check testing for next visit  Spent 30 min with pt care greater than 50% of time spent counseling/coordinating care, counseling as outlined above  Will make attempt to obtain records from primary physician and relay recommendations to primary physician

## 2019-09-06 DIAGNOSIS — E11.9 TYPE 2 DIABETES MELLITUS WITHOUT COMPLICATION, WITHOUT LONG-TERM CURRENT USE OF INSULIN (CMS/HCC): ICD-10-CM

## 2019-09-06 RX ORDER — ACARBOSE 50 MG/1
TABLET ORAL
Qty: 270 TABLET | Refills: 3 | Status: SHIPPED | OUTPATIENT
Start: 2019-09-06 | End: 2022-06-10 | Stop reason: SDUPTHER

## 2019-10-09 ENCOUNTER — RX ONLY (RX ONLY)
Age: 64
End: 2019-10-09

## 2019-10-09 ENCOUNTER — APPOINTMENT (OUTPATIENT)
Dept: URBAN - METROPOLITAN AREA CLINIC 200 | Age: 64
Setting detail: DERMATOLOGY
End: 2019-10-20

## 2019-10-09 DIAGNOSIS — L60.8 OTHER NAIL DISORDERS: ICD-10-CM

## 2019-10-09 DIAGNOSIS — L70.0 ACNE VULGARIS: ICD-10-CM

## 2019-10-09 DIAGNOSIS — L57.8 OTHER SKIN CHANGES DUE TO CHRONIC EXPOSURE TO NONIONIZING RADIATION: ICD-10-CM

## 2019-10-09 PROBLEM — D23.71 OTHER BENIGN NEOPLASM OF SKIN OF RIGHT LOWER LIMB, INCLUDING HIP: Status: ACTIVE | Noted: 2019-10-09

## 2019-10-09 PROCEDURE — OTHER COUNSELING: OTHER

## 2019-10-09 PROCEDURE — OTHER REASSURANCE: OTHER

## 2019-10-09 PROCEDURE — 99213 OFFICE O/P EST LOW 20 MIN: CPT

## 2019-10-09 PROCEDURE — OTHER PRESCRIPTION MEDICATION MANAGEMENT: OTHER

## 2019-10-09 RX ORDER — TRETINOIN 0.6 MG/G
GEL TOPICAL
Qty: 1 | Refills: 4 | Status: ERX | COMMUNITY
Start: 2019-10-09

## 2019-10-09 RX ORDER — TRETINOIN 0.5 MG/G
CREAM TOPICAL
Qty: 1 | Refills: 3 | Status: ERX | COMMUNITY
Start: 2019-10-09

## 2019-10-09 ASSESSMENT — LOCATION ZONE DERM
LOCATION ZONE: FACE
LOCATION ZONE: FINGERNAIL
LOCATION ZONE: TRUNK

## 2019-10-09 ASSESSMENT — LOCATION DETAILED DESCRIPTION DERM
LOCATION DETAILED: LEFT INFERIOR CENTRAL MALAR CHEEK
LOCATION DETAILED: LEFT MEDIAL SUPERIOR CHEST
LOCATION DETAILED: LEFT THUMBNAIL

## 2019-10-09 ASSESSMENT — LOCATION SIMPLE DESCRIPTION DERM
LOCATION SIMPLE: CHEST
LOCATION SIMPLE: LEFT CHEEK
LOCATION SIMPLE: LEFT THUMBNAIL

## 2019-12-27 ENCOUNTER — TELEPHONE (OUTPATIENT)
Dept: ENDOCRINOLOGY | Facility: CLINIC | Age: 64
End: 2019-12-27

## 2019-12-27 NOTE — TELEPHONE ENCOUNTER
Called pt and informed her that Dr. Samson has passed away and that appt has been cancelled and pt will follow up PCP

## 2020-01-21 RX ORDER — ESTRADIOL 10 UG/1
10 INSERT VAGINAL 2 TIMES WEEKLY
Qty: 1 EACH | Refills: 6 | Status: SHIPPED | OUTPATIENT
Start: 2020-01-23 | End: 2020-08-31 | Stop reason: SDUPTHER

## 2020-01-31 DIAGNOSIS — E11.9 TYPE 2 DIABETES MELLITUS WITHOUT COMPLICATION, WITHOUT LONG-TERM CURRENT USE OF INSULIN (CMS/HCC): ICD-10-CM

## 2020-01-31 NOTE — TELEPHONE ENCOUNTER
LOV: 8/13/19  Pt will f/u with PCP  LBW: 8/8/19  30 day rx sent, future refills must be sent by PCP

## 2020-02-02 DIAGNOSIS — E78.2 MIXED HYPERLIPIDEMIA: ICD-10-CM

## 2020-02-03 DIAGNOSIS — Z79.4 TYPE 2 DIABETES MELLITUS WITHOUT COMPLICATION, WITH LONG-TERM CURRENT USE OF INSULIN (CMS/HCC): ICD-10-CM

## 2020-02-03 DIAGNOSIS — E11.9 TYPE 2 DIABETES MELLITUS WITHOUT COMPLICATION, WITH LONG-TERM CURRENT USE OF INSULIN (CMS/HCC): ICD-10-CM

## 2020-02-03 RX ORDER — SIMVASTATIN 20 MG/1
20 TABLET, FILM COATED ORAL NIGHTLY
Qty: 90 TABLET | Refills: 3 | Status: SHIPPED | OUTPATIENT
Start: 2020-02-03 | End: 2021-02-15

## 2020-02-03 RX ORDER — SIMVASTATIN 10 MG/1
TABLET, FILM COATED ORAL
Qty: 90 TABLET | Refills: 3 | OUTPATIENT
Start: 2020-02-03

## 2020-02-03 NOTE — TELEPHONE ENCOUNTER
At her LOV he intended to increase this to 20 mg.  She has a f/u appt with Cardiology soon.  She has NO f/u appt with me and I have never seen her.  Perhaps her Cardiologist or PCP can take over prescribing this for her.  CMF

## 2020-02-03 NOTE — TELEPHONE ENCOUNTER
I have never seen this pt and she has no f/u appt with me since Dr boyd has passed away.  If you want her on statin would you mind Refilling it for her?  Thanks.  Hailey Farrar MD

## 2020-02-19 ENCOUNTER — OFFICE VISIT (OUTPATIENT)
Dept: CARDIOLOGY | Facility: CLINIC | Age: 65
End: 2020-02-19
Payer: COMMERCIAL

## 2020-02-19 ENCOUNTER — HOSPITAL ENCOUNTER (OUTPATIENT)
Dept: CARDIOLOGY | Facility: HOSPITAL | Age: 65
Discharge: HOME | End: 2020-02-19
Attending: INTERNAL MEDICINE
Payer: COMMERCIAL

## 2020-02-19 VITALS
SYSTOLIC BLOOD PRESSURE: 137 MMHG | HEIGHT: 61 IN | WEIGHT: 216.05 LBS | DIASTOLIC BLOOD PRESSURE: 82 MMHG | BODY MASS INDEX: 40.79 KG/M2

## 2020-02-19 VITALS
HEART RATE: 72 BPM | WEIGHT: 210 LBS | RESPIRATION RATE: 14 BRPM | BODY MASS INDEX: 39.65 KG/M2 | OXYGEN SATURATION: 97 % | SYSTOLIC BLOOD PRESSURE: 128 MMHG | DIASTOLIC BLOOD PRESSURE: 72 MMHG | HEIGHT: 61 IN | TEMPERATURE: 98.6 F

## 2020-02-19 DIAGNOSIS — I10 ESSENTIAL HYPERTENSION: Primary | ICD-10-CM

## 2020-02-19 DIAGNOSIS — E11.9 TYPE 2 DIABETES MELLITUS WITHOUT COMPLICATION, WITHOUT LONG-TERM CURRENT USE OF INSULIN (CMS/HCC): ICD-10-CM

## 2020-02-19 DIAGNOSIS — I35.0 NONRHEUMATIC AORTIC VALVE STENOSIS: ICD-10-CM

## 2020-02-19 DIAGNOSIS — E78.2 MIXED HYPERLIPIDEMIA: ICD-10-CM

## 2020-02-19 LAB
AORTIC ROOT ANNULUS - M-MODE: 2.7 CM
AORTIC VALVE MEAN VELOCITY: 1.61 M/S
AORTIC VALVE VELOCITY TIME INTEGRAL: 51.4 CM
ASCENDING AORTA: 3.6 CM
AV MEAN GRADIENT: 12 MMHG
AV PEAK GRADIENT: 22 MMHG
AV PEAK VELOCITY-S: 2.33 M/S
AV VALVE AREA: 2.86 CM2
BSA FOR ECHO PROCEDURE: 2.05 M2
CUSP SEPARATION: 1 CM
DOP CALC LVOT STROKE VOLUME: 147.13 ML
E WAVE DECELERATION TIME: 438 MS
E/A RATIO: 0.6
E/E' RATIO: 10
E/LAT E' RATIO: 8.2
EDV (BP): 70 CM3
EF (A4C): 83 %
EF A2C: 83 %
EJECTION FRACTION: 83 %
ESV (BP): 12 CM3
FRACTIONAL SHORTENING: 47.7 %
INTERVENTRICULAR SEPTUM: 1.2 CM
LA ESV (BP): 50 CM3
LA ESV INDEX (A2C): 25.37 CM3/M2
LA ESV INDEX (BP): 24.39 CM3/M2
LAAS-AP2: 18.7 CM2
LAAS-AP4: 18.1 CM2
LAD 2D: 4.1 CM
LALD A4C: 5.47 CM
LALD A4C: 5.51 CM
LAV-S: 52 CM3
LEFT ATRIUM VOLUME INDEX: 23.9 CM3/M2
LEFT ATRIUM VOLUME: 49 CM3
LEFT INTERNAL DIMENSION IN SYSTOLE: 2.3 CM (ref 2.79–4.22)
LEFT VENTRICLE DIASTOLIC VOLUME INDEX: 33.66 CM3/M2
LEFT VENTRICLE DIASTOLIC VOLUME: 69 CM3
LEFT VENTRICLE SYSTOLIC VOLUME INDEX: 5.85 CM3/M2
LEFT VENTRICLE SYSTOLIC VOLUME: 12 CM3
LEFT VENTRICULAR INTERNAL DIMENSION IN DIASTOLE: 4.4 CM (ref 4.73–6.57)
LEFT VENTRICULAR POSTERIOR WALL IN END DIASTOLE: 1.3 CM (ref 0.62–1.15)
LV DIASTOLIC VOLUME: 70 CM3
LV ESV (APICAL 2 CHAMBER): 12 CM3
LVAD-AP2: 26 CM2
LVAD-AP4: 25.8 CM2
LVAS-AP2: 8.9 CM2
LVAS-AP4: 9.42 CM2
LVEDVI(A2C): 34.15 CM3/M2
LVEDVI(BP): 34.15 CM3/M2
LVESVI(A2C): 5.85 CM3/M2
LVESVI(BP): 5.85 CM3/M2
LVLD-AP2: 8.2 CM
LVLD-AP4: 7.87 CM
LVLS-AP2: 6.55 CM
LVLS-AP4: 6.49 CM
LVOT 2D: 2.1 CM
LVOT A: 3.46 CM2
LVOT MG: 12 MMHG
LVOT MV: 1.63 M/S
LVOT PEAK VELOCITY: 2.23 M/S
LVOT PG: 20 MMHG
LVOT VTI: 42.5 CM
MV E'TISSUE VEL-LAT: 0.05 M/S
MV E'TISSUE VEL-MED: 0.04 M/S
MV PEAK A VEL: 0.64 M/S
MV PEAK E VEL: 0.4 M/S
POSTERIOR WALL: 1.3 CM
RVOT VMAX: 0.69 M/S
RVOT VTI: 15.1 CM
SEPTAL TISSUE DOPPLER FREE WALL LATE DIA VELOCITY (APICAL 4 CHAMBER VIEW): 0.11 M/S
Z-SCORE OF LEFT VENTRICULAR DIMENSION IN END DIASTOLE: -2.36
Z-SCORE OF LEFT VENTRICULAR DIMENSION IN END SYSTOLE: -3.15
Z-SCORE OF LEFT VENTRICULAR POSTERIOR WALL IN END DIASTOLE: 2.27

## 2020-02-19 PROCEDURE — 93000 ELECTROCARDIOGRAM COMPLETE: CPT | Performed by: INTERNAL MEDICINE

## 2020-02-19 PROCEDURE — 93306 TTE W/DOPPLER COMPLETE: CPT | Mod: 26 | Performed by: INTERNAL MEDICINE

## 2020-02-19 PROCEDURE — 93306 TTE W/DOPPLER COMPLETE: CPT

## 2020-02-19 PROCEDURE — 99214 OFFICE O/P EST MOD 30 MIN: CPT | Performed by: INTERNAL MEDICINE

## 2020-02-19 ASSESSMENT — ENCOUNTER SYMPTOMS
HEMATOLOGIC/LYMPHATIC NEGATIVE: 1
WEIGHT LOSS: 1
EYES NEGATIVE: 1
MUSCULOSKELETAL NEGATIVE: 1
NUMBNESS: 1
NERVOUS/ANXIOUS: 1
GASTROINTESTINAL NEGATIVE: 1
RESPIRATORY NEGATIVE: 1

## 2020-02-19 NOTE — PROGRESS NOTES
Chief Complaint: I saw Mrs. Gifford in the office today on a routine visit for her hypertension and aortic stenosis.  She is doing well has lost 6 pounds and denies any form of chest discomfort or shortness of breath with exertion, anxiety, cold weather, postprandially, with sex, at rest, or nocturnally.  She denies exertional dyspnea, proximal nocturnal dyspnea, orthopnea, palpitations, syncope, ankle edema, she has nocturia.       Medications:  Current Outpatient Medications   Medication Sig Dispense Refill   • acarbose (PRECOSE) 50 mg tablet TAKE 1 TABLET BY ORAL ROUTE 3 TIMES EVERY DAY AT THE START (WITH THE FIRST BITE) OF EACH MAIN MEAL 270 tablet 3   • aspirin (ASPIR-81) 81 mg enteric coated tablet take 1 tablet by oral route  every day     • b complex vitamins tablet No SIG Entered     • blood sugar diagnostic (CONTOUR NEXT TEST STRIPS) strip Check 4 times a day 150 strip 11   • cholecalciferol, vitamin D3, (VITAMIN D3) 1,000 unit capsule One tablet daily     • dulaglutide (TRULICITY) 1.5 mg/0.5 mL pen injector Inject 0.5 mL (1.5 mg total) under the skin once a week. 4 pen 0   • estradiol (IMVEXXY STARTER PACK) 10 mcg insert, dose pack Insert 10 mcg into the vagina daily. 38 each 6   • IMVEXXY MAINTENANCE PACK 10 mcg insert Insert 10 mcg into the vagina 2 (two) times a week (Mon, Thu). 1 each 6   • lancets (MICROLET LANCET) misc Check blood glucose 4 times a day 150 each 11   • meclizine (ANTIVERT) 25 mg tablet Take 1 tablet (25 mg total) by mouth 2 (two) times a day as needed for dizziness. 60 tablet 4   • metFORMIN (GLUCOPHAGE) 500 mg tablet TAKE 2 TABLETS BY MOUTH TWICE A DAY WITH A MEAL 360 tablet 3   • omega-3 fatty acids-fish oil (omega-3) 300-1,000 mg capsule take 1 capsule daily     • quinapril (ACCUPRIL) 10 mg tablet Take 2 tablets (20 mg total) by mouth once daily. 180 tablet 3   • sertraline (ZOLOFT) 100 mg tablet Take 100 mg by mouth every evening.  2   • simvastatin (ZOCOR) 20 mg tablet Take  1 tablet (20 mg total) by mouth nightly. 90 tablet 3   • azelastine (ASTELIN) 137 mcg (0.1 %) nasal spray Administer 1 spray into each nostril daily. Use in each nostril as directed.     • DYMISTA 137-50 mcg/spray nasal spray Administer 1 spray into each nostril 2 (two) times a day.  3   • estradiol (YUVAFEM) 10 mcg tablet Insert 1 tablet (10 mcg total) into the vagina 2 (two) times a week (Mon, Thu). (Patient not taking: Reported on 8/13/2019 ) 24 tablet 6   • LORazepam (ATIVAN) 0.5 mg tablet Take 1 tablet (0.5 mg total) by mouth once for 1 dose. Before MRI (Patient not taking: Reported on 2/19/2020 ) 2 tablet 0     No current facility-administered medications for this visit.        Review of Systems:  Review of Systems   Constitution: Positive for weight loss.   HENT: Positive for hearing loss.    Eyes: Negative.    Respiratory: Negative.    Endocrine: Positive for heat intolerance.   Hematologic/Lymphatic: Negative.    Skin: Negative.    Musculoskeletal: Negative.    Gastrointestinal: Negative.    Genitourinary: Positive for nocturia.   Neurological: Positive for numbness.   Psychiatric/Behavioral: The patient is nervous/anxious.    Allergic/Immunologic: Positive for environmental allergies.       Physical Exam:  Vitals:    02/19/20 1545   BP: 128/72   Pulse: 72   Resp: 14   Temp: 37 °C (98.6 °F)   SpO2: 97%     Physical Exam   Constitutional: She is oriented to person, place, and time. She appears well-developed and well-nourished.   overweight   HENT:   Head: Normocephalic and atraumatic.   Eyes: Pupils are equal, round, and reactive to light. Conjunctivae and EOM are normal.   Neck: Normal range of motion. Neck supple.   Cardiovascular: Normal rate, regular rhythm and intact distal pulses.   Murmur (AS with S4) heard.  Pulmonary/Chest: Effort normal and breath sounds normal.   Abdominal: Soft. Bowel sounds are normal.   Musculoskeletal: Normal range of motion.   Neurological: She is alert and oriented to  person, place, and time. She has normal strength and normal reflexes.   Skin: Skin is warm, dry and intact.   Psychiatric: She has a normal mood and affect. Her speech is normal and behavior is normal. Judgment and thought content normal. Cognition and memory are normal.     EKG:SR T wave abn    Assessment and Plan:  Impression:  Hypertension controlled.  Aortic stenosis.  Diabetes mellitus type 2.  Overweight.  Hyperlipidemia.  Recommendation:  She is hemodynamically stable I did not have to make any changes in her medications.  We will forward today's echocardiogram results to you.  We will see her again in 6 months time, we counseled her on diet and exercise.  Thank you very much for allowing us to see this very nice woman in the office today.  Lupillo Cooley, DO

## 2020-02-19 NOTE — LETTER
February 19, 2020     Billy Antunez DO  [address] [facility] [address]    Patient: Citlali Gifford  YOB: 1955  Date of Visit: 2/19/2020      Dear Dr. Antunez:    Thank you for referring Citlali Gifford to me for evaluation. Below are my notes for this consultation.    If you have questions, please do not hesitate to call me. I look forward to following your patient along with you.         Sincerely,        Lupillo Cooley DO        CC: No Recipients  Lupillo Cooley DO  2/19/2020  4:30 PM  Signed      Chief Complaint: I saw Mrs. Gifford in the office today on a routine visit for her hypertension and aortic stenosis.  She is doing well has lost 6 pounds and denies any form of chest discomfort or shortness of breath with exertion, anxiety, cold weather, postprandially, with sex, at rest, or nocturnally.  She denies exertional dyspnea, proximal nocturnal dyspnea, orthopnea, palpitations, syncope, ankle edema, she has nocturia.       Medications:  Current Outpatient Medications   Medication Sig Dispense Refill   • acarbose (PRECOSE) 50 mg tablet TAKE 1 TABLET BY ORAL ROUTE 3 TIMES EVERY DAY AT THE START (WITH THE FIRST BITE) OF EACH MAIN MEAL 270 tablet 3   • aspirin (ASPIR-81) 81 mg enteric coated tablet take 1 tablet by oral route  every day     • b complex vitamins tablet No SIG Entered     • blood sugar diagnostic (CONTOUR NEXT TEST STRIPS) strip Check 4 times a day 150 strip 11   • cholecalciferol, vitamin D3, (VITAMIN D3) 1,000 unit capsule One tablet daily     • dulaglutide (TRULICITY) 1.5 mg/0.5 mL pen injector Inject 0.5 mL (1.5 mg total) under the skin once a week. 4 pen 0   • estradiol (IMVEXXY STARTER PACK) 10 mcg insert, dose pack Insert 10 mcg into the vagina daily. 38 each 6   • IMVEXXY MAINTENANCE PACK 10 mcg insert Insert 10 mcg into the vagina 2 (two) times a week (Mon, Thu). 1 each 6   • lancets (MICROLET LANCET) misc Check blood glucose 4 times a day 150 each 11   •  meclizine (ANTIVERT) 25 mg tablet Take 1 tablet (25 mg total) by mouth 2 (two) times a day as needed for dizziness. 60 tablet 4   • metFORMIN (GLUCOPHAGE) 500 mg tablet TAKE 2 TABLETS BY MOUTH TWICE A DAY WITH A MEAL 360 tablet 3   • omega-3 fatty acids-fish oil (omega-3) 300-1,000 mg capsule take 1 capsule daily     • quinapril (ACCUPRIL) 10 mg tablet Take 2 tablets (20 mg total) by mouth once daily. 180 tablet 3   • sertraline (ZOLOFT) 100 mg tablet Take 100 mg by mouth every evening.  2   • simvastatin (ZOCOR) 20 mg tablet Take 1 tablet (20 mg total) by mouth nightly. 90 tablet 3   • azelastine (ASTELIN) 137 mcg (0.1 %) nasal spray Administer 1 spray into each nostril daily. Use in each nostril as directed.     • DYMISTA 137-50 mcg/spray nasal spray Administer 1 spray into each nostril 2 (two) times a day.  3   • estradiol (YUVAFEM) 10 mcg tablet Insert 1 tablet (10 mcg total) into the vagina 2 (two) times a week (Mon, Thu). (Patient not taking: Reported on 8/13/2019 ) 24 tablet 6   • LORazepam (ATIVAN) 0.5 mg tablet Take 1 tablet (0.5 mg total) by mouth once for 1 dose. Before MRI (Patient not taking: Reported on 2/19/2020 ) 2 tablet 0     No current facility-administered medications for this visit.        Review of Systems:  Review of Systems   Constitution: Positive for weight loss.   HENT: Positive for hearing loss.    Eyes: Negative.    Respiratory: Negative.    Endocrine: Positive for heat intolerance.   Hematologic/Lymphatic: Negative.    Skin: Negative.    Musculoskeletal: Negative.    Gastrointestinal: Negative.    Genitourinary: Positive for nocturia.   Neurological: Positive for numbness.   Psychiatric/Behavioral: The patient is nervous/anxious.    Allergic/Immunologic: Positive for environmental allergies.       Physical Exam:  Vitals:    02/19/20 1545   BP: 128/72   Pulse: 72   Resp: 14   Temp: 37 °C (98.6 °F)   SpO2: 97%     Physical Exam   Constitutional: She is oriented to person, place, and time.  She appears well-developed and well-nourished.   overweight   HENT:   Head: Normocephalic and atraumatic.   Eyes: Pupils are equal, round, and reactive to light. Conjunctivae and EOM are normal.   Neck: Normal range of motion. Neck supple.   Cardiovascular: Normal rate, regular rhythm and intact distal pulses.   Murmur (AS with S4) heard.  Pulmonary/Chest: Effort normal and breath sounds normal.   Abdominal: Soft. Bowel sounds are normal.   Musculoskeletal: Normal range of motion.   Neurological: She is alert and oriented to person, place, and time. She has normal strength and normal reflexes.   Skin: Skin is warm, dry and intact.   Psychiatric: She has a normal mood and affect. Her speech is normal and behavior is normal. Judgment and thought content normal. Cognition and memory are normal.     EKG:SR T wave abn    Assessment and Plan:  Impression:  Hypertension controlled.  Aortic stenosis.  Diabetes mellitus type 2.  Overweight.  Hyperlipidemia.  Recommendation:  She is hemodynamically stable I did not have to make any changes in her medications.  We will forward today's echocardiogram results to you.  We will see her again in 6 months time, we counseled her on diet and exercise.  Thank you very much for allowing us to see this very nice woman in the office today.  Lupillo Cooley,

## 2020-02-25 ENCOUNTER — TELEPHONE (OUTPATIENT)
Dept: SCHEDULING | Facility: CLINIC | Age: 65
End: 2020-02-25

## 2020-02-25 NOTE — TELEPHONE ENCOUNTER
Pt would like to know if she can access her Echo results on the pt portal.    Pt can be reached at [phone]

## 2020-03-03 ENCOUNTER — TELEPHONE (OUTPATIENT)
Dept: SCHEDULING | Facility: CLINIC | Age: 65
End: 2020-03-03

## 2020-03-11 DIAGNOSIS — I10 ESSENTIAL HYPERTENSION: Primary | ICD-10-CM

## 2020-03-11 LAB
ALBUMIN SERPL-MCNC: 4 G/DL (ref 3.6–5.1)
ALBUMIN/GLOB SERPL: 1.4 (CALC) (ref 1–2.5)
ALP SERPL-CCNC: 57 U/L (ref 37–153)
ALT SERPL-CCNC: 79 U/L (ref 6–29)
AST SERPL-CCNC: 35 U/L (ref 10–35)
BASOPHILS # BLD AUTO: 38 CELLS/UL (ref 0–200)
BASOPHILS NFR BLD AUTO: 0.6 %
BILIRUB SERPL-MCNC: 0.5 MG/DL (ref 0.2–1.2)
BUN SERPL-MCNC: 19 MG/DL (ref 7–25)
BUN/CREAT SERPL: ABNORMAL (CALC) (ref 6–22)
CALCIUM SERPL-MCNC: 9.4 MG/DL (ref 8.6–10.4)
CHLORIDE SERPL-SCNC: 106 MMOL/L (ref 98–110)
CHOLEST SERPL-MCNC: 140 MG/DL
CHOLEST/HDLC SERPL: 2.9 (CALC)
CO2 SERPL-SCNC: 26 MMOL/L (ref 20–32)
CREAT SERPL-MCNC: 0.75 MG/DL (ref 0.5–0.99)
CRP SERPL HS-MCNC: 2 MG/L
EOSINOPHIL # BLD AUTO: 170 CELLS/UL (ref 15–500)
EOSINOPHIL NFR BLD AUTO: 2.7 %
ERYTHROCYTE [DISTWIDTH] IN BLOOD BY AUTOMATED COUNT: 13.4 % (ref 11–15)
GLOBULIN SER CALC-MCNC: 2.8 G/DL (CALC) (ref 1.9–3.7)
GLUCOSE SERPL-MCNC: 120 MG/DL (ref 65–99)
HBA1C MFR BLD: 6.3 % OF TOTAL HGB
HCT VFR BLD AUTO: 44.1 % (ref 35–45)
HDLC SERPL-MCNC: 48 MG/DL
HGB BLD-MCNC: 14.8 G/DL (ref 11.7–15.5)
LDLC SERPL CALC-MCNC: 72 MG/DL (CALC)
LYMPHOCYTES # BLD AUTO: 2029 CELLS/UL (ref 850–3900)
LYMPHOCYTES NFR BLD AUTO: 32.2 %
MCH RBC QN AUTO: 29.8 PG (ref 27–33)
MCHC RBC AUTO-ENTMCNC: 33.6 G/DL (ref 32–36)
MCV RBC AUTO: 88.9 FL (ref 80–100)
MONOCYTES # BLD AUTO: 378 CELLS/UL (ref 200–950)
MONOCYTES NFR BLD AUTO: 6 %
NEUTROPHILS # BLD AUTO: 3686 CELLS/UL (ref 1500–7800)
NEUTROPHILS NFR BLD AUTO: 58.5 %
NONHDLC SERPL-MCNC: 92 MG/DL (CALC)
PLATELET # BLD AUTO: 275 THOUSAND/UL (ref 140–400)
PMV BLD REES-ECKER: 9.9 FL (ref 7.5–12.5)
POTASSIUM SERPL-SCNC: 4.3 MMOL/L (ref 3.5–5.3)
PROT SERPL-MCNC: 6.8 G/DL (ref 6.1–8.1)
QUEST EGFR NON-AFR. AMERICAN: 85 ML/MIN/1.73M2
RBC # BLD AUTO: 4.96 MILLION/UL (ref 3.8–5.1)
SODIUM SERPL-SCNC: 140 MMOL/L (ref 135–146)
TRIGL SERPL-MCNC: 112 MG/DL
WBC # BLD AUTO: 6.3 THOUSAND/UL (ref 3.8–10.8)

## 2020-04-09 ENCOUNTER — TELEMEDICINE (OUTPATIENT)
Dept: ENDOCRINOLOGY | Facility: CLINIC | Age: 65
End: 2020-04-09
Payer: COMMERCIAL

## 2020-04-09 DIAGNOSIS — E11.9 TYPE 2 DIABETES MELLITUS WITHOUT COMPLICATION, WITHOUT LONG-TERM CURRENT USE OF INSULIN (CMS/HCC): ICD-10-CM

## 2020-04-09 PROCEDURE — 99441 PR PHYS/QHP TELEPHONE EVALUATION 5-10 MIN: CPT | Performed by: INTERNAL MEDICINE

## 2020-04-09 RX ORDER — ACARBOSE 50 MG/1
50 TABLET ORAL
Qty: 270 TABLET | Refills: 3 | Status: SHIPPED | OUTPATIENT
Start: 2020-04-09 | End: 2021-09-16

## 2020-04-09 RX ORDER — FOLIC ACID 1 MG/1
1 TABLET ORAL DAILY
COMMUNITY

## 2020-04-09 RX ORDER — ZINC SULFATE 50(220)MG
220 CAPSULE ORAL DAILY
COMMUNITY

## 2020-04-09 NOTE — PROGRESS NOTES
Request for Consent:  You and I are about to have a telemedicine check-in or visit. This is allowed because you are already my patient, and you have requested it.  This telemedicine visit will be billed to your health insurance or you, if you are self-insured.  You understand you will be responsible for any copayments or coinsurances that apply to your telemedicine visit.  Before starting our telemedicine visit, I am required to get your consent for this virtual check-in or visit by telemedicine. Do you consent?      Patient Response to Request for Consent: Yes    The following have been reviewed and updated as appropriate in this visit:  Tobacco  Allergies  Meds  Problems  Med Hx  Surg Hx  Fam Hx  Soc Hx          Visit Documentation:    New A1C was down to 6.3 on metformin 1000 in the morning and 500 at night--do not recommend.  Trulicity, needs refill and acarbose 50 mg with every meal.  Do not recommend a change.    Glucose this morning of 154.  Doing well no complaints, no low episodes.    LDL of 72 on sivastatin.  No change in medications.    Recommend:  Labs in 6 months and follow-up      Time Spent in Medical Discussion During This Encounter:

## 2020-04-09 NOTE — ASSESSMENT & PLAN NOTE
Reviewed meds and A1C of 6.3%.  No changes in meds made and refill of meds with follow-up in 6 months

## 2020-04-14 ENCOUNTER — TELEPHONE (OUTPATIENT)
Dept: ENDOCRINOLOGY | Facility: CLINIC | Age: 65
End: 2020-04-14

## 2020-04-14 DIAGNOSIS — E11.9 TYPE 2 DIABETES MELLITUS WITHOUT COMPLICATION, WITHOUT LONG-TERM CURRENT USE OF INSULIN (CMS/HCC): ICD-10-CM

## 2020-04-14 NOTE — TELEPHONE ENCOUNTER
Pt called stating she picked up her prescription for trulicity  Only received one box when she typically gets two boxes at a time with zero refills.   Informed her that a new prescription was sent on 4/9 for 4 pens with 11 refills    Pt called pharmacy and stated office can request 90 day rx.   Pt called office requesting 90 day rx for trulicity.

## 2020-05-20 DIAGNOSIS — E11.9 TYPE 2 DIABETES MELLITUS WITHOUT COMPLICATION, WITHOUT LONG-TERM CURRENT USE OF INSULIN (CMS/HCC): ICD-10-CM

## 2020-05-21 RX ORDER — METFORMIN HYDROCHLORIDE 500 MG/1
TABLET ORAL
Qty: 360 TABLET | Refills: 3 | Status: SHIPPED | OUTPATIENT
Start: 2020-05-21 | End: 2021-05-06

## 2020-06-09 DIAGNOSIS — E11.9 TYPE 2 DIABETES MELLITUS WITHOUT COMPLICATION, UNSPECIFIED WHETHER LONG TERM INSULIN USE (CMS/HCC): ICD-10-CM

## 2020-06-17 LAB
ALBUMIN SERPL-MCNC: 4 G/DL (ref 3.6–5.1)
ALBUMIN/GLOB SERPL: 1.4 (CALC) (ref 1–2.5)
ALP SERPL-CCNC: 54 U/L (ref 37–153)
ALT SERPL-CCNC: 89 U/L (ref 6–29)
AST SERPL-CCNC: 44 U/L (ref 10–35)
BILIRUB SERPL-MCNC: 0.4 MG/DL (ref 0.2–1.2)
BUN SERPL-MCNC: 19 MG/DL (ref 7–25)
BUN/CREAT SERPL: ABNORMAL (CALC) (ref 6–22)
CALCIUM SERPL-MCNC: 9.5 MG/DL (ref 8.6–10.4)
CHLORIDE SERPL-SCNC: 105 MMOL/L (ref 98–110)
CHOLEST SERPL-MCNC: 135 MG/DL
CHOLEST/HDLC SERPL: 2.9 (CALC)
CO2 SERPL-SCNC: 24 MMOL/L (ref 20–32)
CREAT SERPL-MCNC: 0.75 MG/DL (ref 0.5–0.99)
GLOBULIN SER CALC-MCNC: 2.9 G/DL (CALC) (ref 1.9–3.7)
GLUCOSE SERPL-MCNC: 158 MG/DL (ref 65–99)
HBA1C MFR BLD: 6.8 % OF TOTAL HGB
HDLC SERPL-MCNC: 46 MG/DL
LDLC SERPL CALC-MCNC: 66 MG/DL (CALC)
NONHDLC SERPL-MCNC: 89 MG/DL (CALC)
POTASSIUM SERPL-SCNC: 4.3 MMOL/L (ref 3.5–5.3)
PROT SERPL-MCNC: 6.9 G/DL (ref 6.1–8.1)
QUEST EGFR NON-AFR. AMERICAN: 85 ML/MIN/1.73M2
SARS-COV-2 IGG SERPL QL IA: NEGATIVE
SODIUM SERPL-SCNC: 140 MMOL/L (ref 135–146)
TRIGL SERPL-MCNC: 151 MG/DL

## 2020-06-29 ENCOUNTER — TELEPHONE (OUTPATIENT)
Dept: ENDOCRINOLOGY | Facility: CLINIC | Age: 65
End: 2020-06-29

## 2020-06-29 NOTE — TELEPHONE ENCOUNTER
----- Message from Eda Zapata MD sent at 6/29/2020  1:13 PM EDT -----  Angela-could you call the patient to review the results? These were ordered by Dr. Vazquez.  A1c at goal.Liver function slightly elevated.  Triglycerides slightly high--she should focus on exercise and dietary modifications in addition to continuing her current medications.  Coronavirus antibody test negative.  Recommend repeating labs in 4 months and following up afterwards. Thank you.

## 2020-06-29 NOTE — TELEPHONE ENCOUNTER
Reached out to pt  Tried to review results  Pt stated she has a copy of her results, her family is going through a hard time right now, she lost her mother.   She will call back for an appointment. She didn't need me to go through her results.

## 2020-07-10 ENCOUNTER — APPOINTMENT (OUTPATIENT)
Dept: URBAN - METROPOLITAN AREA CLINIC 200 | Age: 65
Setting detail: DERMATOLOGY
End: 2020-07-19

## 2020-07-10 DIAGNOSIS — B35.1 TINEA UNGUIUM: ICD-10-CM

## 2020-07-10 DIAGNOSIS — T07XXXA INSECT BITE, NONVENOMOUS, OF OTHER, MULTIPLE, AND UNSPECIFIED SITES, WITHOUT MENTION OF INFECTION: ICD-10-CM

## 2020-07-10 PROBLEM — S80.862A INSECT BITE (NONVENOMOUS), LEFT LOWER LEG, INITIAL ENCOUNTER: Status: ACTIVE | Noted: 2020-07-10

## 2020-07-10 PROBLEM — S80.861A INSECT BITE (NONVENOMOUS), RIGHT LOWER LEG, INITIAL ENCOUNTER: Status: ACTIVE | Noted: 2020-07-10

## 2020-07-10 PROCEDURE — OTHER PRESCRIPTION: OTHER

## 2020-07-10 PROCEDURE — 99213 OFFICE O/P EST LOW 20 MIN: CPT | Mod: 95

## 2020-07-10 PROCEDURE — OTHER COUNSELING: OTHER

## 2020-07-10 RX ORDER — BETAMETHASONE DIPROPIONATE 0.5 MG/G
CREAM, AUGMENTED TOPICAL
Qty: 1 | Refills: 3 | Status: ERX | COMMUNITY
Start: 2020-07-10

## 2020-07-10 RX ORDER — CICLOPIROX 80 MG/ML
SOLUTION TOPICAL QD
Qty: 1 | Refills: 6 | Status: ERX | COMMUNITY
Start: 2020-07-10

## 2020-07-10 ASSESSMENT — LOCATION DETAILED DESCRIPTION DERM
LOCATION DETAILED: LEFT DISTAL PRETIBIAL REGION
LOCATION DETAILED: RIGHT DISTAL PRETIBIAL REGION

## 2020-07-10 ASSESSMENT — LOCATION SIMPLE DESCRIPTION DERM
LOCATION SIMPLE: LEFT PRETIBIAL REGION
LOCATION SIMPLE: RIGHT PRETIBIAL REGION

## 2020-07-10 ASSESSMENT — LOCATION ZONE DERM: LOCATION ZONE: LEG

## 2020-08-03 DIAGNOSIS — E11.9 TYPE 2 DIABETES MELLITUS WITHOUT COMPLICATION, WITHOUT LONG-TERM CURRENT USE OF INSULIN (CMS/HCC): ICD-10-CM

## 2020-08-19 ENCOUNTER — TELEPHONE (OUTPATIENT)
Dept: CARDIOLOGY | Facility: CLINIC | Age: 65
End: 2020-08-19

## 2020-08-19 NOTE — TELEPHONE ENCOUNTER
Left message for patient regarding missed appointment on  8/19. If patient calls back please reschedule next available. Thanks

## 2020-08-31 ENCOUNTER — TELEPHONE (OUTPATIENT)
Dept: OBSTETRICS AND GYNECOLOGY | Facility: CLINIC | Age: 65
End: 2020-08-31

## 2020-08-31 ENCOUNTER — TELEPHONE (OUTPATIENT)
Dept: ENDOCRINOLOGY | Facility: CLINIC | Age: 65
End: 2020-08-31

## 2020-08-31 RX ORDER — ESTRADIOL 10 UG/1
10 INSERT VAGINAL 2 TIMES WEEKLY
Qty: 1 EACH | Refills: 6 | Status: SHIPPED | OUTPATIENT
Start: 2020-08-31 | End: 2021-03-24

## 2020-09-03 ENCOUNTER — OFFICE VISIT (OUTPATIENT)
Dept: ENDOCRINOLOGY | Facility: CLINIC | Age: 65
End: 2020-09-03
Payer: COMMERCIAL

## 2020-09-03 VITALS
WEIGHT: 210 LBS | TEMPERATURE: 98.2 F | HEART RATE: 68 BPM | OXYGEN SATURATION: 98 % | RESPIRATION RATE: 18 BRPM | BODY MASS INDEX: 39.65 KG/M2 | DIASTOLIC BLOOD PRESSURE: 68 MMHG | HEIGHT: 61 IN | SYSTOLIC BLOOD PRESSURE: 116 MMHG

## 2020-09-03 DIAGNOSIS — Z79.4 TYPE 2 DIABETES MELLITUS WITHOUT COMPLICATION, WITH LONG-TERM CURRENT USE OF INSULIN (CMS/HCC): Primary | ICD-10-CM

## 2020-09-03 DIAGNOSIS — E11.9 TYPE 2 DIABETES MELLITUS WITHOUT COMPLICATION, WITH LONG-TERM CURRENT USE OF INSULIN (CMS/HCC): Primary | ICD-10-CM

## 2020-09-03 DIAGNOSIS — E55.9 VITAMIN D DEFICIENCY: ICD-10-CM

## 2020-09-03 DIAGNOSIS — E11.9 TYPE 2 DIABETES MELLITUS WITHOUT COMPLICATION, UNSPECIFIED WHETHER LONG TERM INSULIN USE (CMS/HCC): ICD-10-CM

## 2020-09-03 PROCEDURE — 99215 OFFICE O/P EST HI 40 MIN: CPT | Performed by: INTERNAL MEDICINE

## 2020-09-03 RX ORDER — LANCETS
EACH MISCELLANEOUS
Qty: 150 EACH | Refills: 11 | Status: SHIPPED | OUTPATIENT
Start: 2020-09-03 | End: 2020-09-04 | Stop reason: SDUPTHER

## 2020-09-03 ASSESSMENT — ENCOUNTER SYMPTOMS
EYES NEGATIVE: 1
NERVOUS/ANXIOUS: 1
RESPIRATORY NEGATIVE: 1
UNEXPECTED WEIGHT CHANGE: 1
CARDIOVASCULAR NEGATIVE: 1
ABDOMINAL DISTENTION: 1

## 2020-09-03 NOTE — PATIENT INSTRUCTIONS
Please increase your metformin dose to `1gm in morning and 1 gm in evening.  Please remember to take your acarbose three daily with meals.   Please have your labs done before your next visit in 3 months  Please follow up with your PCP regarding your abnormal liver enzymes.

## 2020-09-03 NOTE — PROGRESS NOTES
Subjective      Patient ID: Citlali Gifford is a 64 y.o. female here for DM2, HTN, HLD f/u visit.     HPI     Initial diagnosis: 6 years ago.   Family history: Maternal side.   Previous endocrinologist: Dr. Samson  Started on treatment:  Since diagnosis.   Current regimen: Metformin 1g in AM and 500mg in PM , Trulicity 1.5q weekly, Acarbose 50mg BID AC   Compliance: Misses acarbose often   Proper pill / insulin technique:  NA  History of insulin use: NA   Failed / AE to meds in past: Jardiance causing yeast infections.   SMBx/day; Range: 150-200  Hypoglycemia: NA  Hypoglycemia awareness / treatment: NA  Complications: None reported  Last eye exam: Coming in 10/2020  Foot exam: WNL in 2020  Taking ACEi and Statin.   Exercise: NA  Weight changes: Increasing last few weeks due to social stressors.   Diet:  B: Protein+ Carb  L: Vegetables+ protein  D: Protein + vegetable + starch.   Seen DM educator / nutritionist in past: No  The following have been reviewed and updated as appropriate in this visit:    Currently stressed from recent death of mother and other familial social stressors.        Review of Systems   Constitutional: Positive for unexpected weight change.   HENT: Negative.    Eyes: Negative.    Respiratory: Negative.    Cardiovascular: Negative.    Gastrointestinal: Positive for abdominal distention.   Psychiatric/Behavioral: The patient is nervous/anxious.         Depressed mood from social stressors     Wt Readings from Last 10 Encounters:   20 95.3 kg (210 lb)   20 98 kg (216 lb 0.8 oz)   20 95.3 kg (210 lb)   19 98.2 kg (216 lb 6.4 oz)   19 97.3 kg (214 lb 6.4 oz)   19 97.5 kg (215 lb)   19 99.8 kg (220 lb)   18 96.8 kg (213 lb 6.4 oz)   18 98.3 kg (216 lb 12.8 oz)   18 97.1 kg (214 lb)     Family History   Problem Relation Age of Onset   • Colon cancer Paternal Grandfather    • Diabetes Biological Mother    • Heart attack Biological  Father        Social History     Socioeconomic History   • Marital status:      Spouse name: Not on file   • Number of children: Not on file   • Years of education: Not on file   • Highest education level: Not on file   Occupational History   • Not on file   Social Needs   • Financial resource strain: Not on file   • Food insecurity:     Worry: Not on file     Inability: Not on file   • Transportation needs:     Medical: Not on file     Non-medical: Not on file   Tobacco Use   • Smoking status: Never Smoker   • Smokeless tobacco: Never Used   Substance and Sexual Activity   • Alcohol use: Yes     Comment: ocasional   • Drug use: No   • Sexual activity: Not on file     Comment:    Lifestyle   • Physical activity:     Days per week: Not on file     Minutes per session: Not on file   • Stress: Not on file   Relationships   • Social connections:     Talks on phone: Not on file     Gets together: Not on file     Attends [employer] service: Not on file     Active member of club or organization: Not on file     Attends meetings of clubs or organizations: Not on file     Relationship status: Not on file   • Intimate partner violence:     Fear of current or ex partner: Not on file     Emotionally abused: Not on file     Physically abused: Not on file     Forced sexual activity: Not on file   Other Topics Concern   • Not on file   Social History Narrative   • Not on file         Current Outpatient Medications:   •  acarbose (PRECOSE) 50 mg tablet, TAKE 1 TABLET BY ORAL ROUTE 3 TIMES EVERY DAY AT THE START (WITH THE FIRST BITE) OF EACH MAIN MEAL, Disp: 270 tablet, Rfl: 3  •  aspirin (ASPIR-81) 81 mg enteric coated tablet, take 1 tablet by oral route  every day, Disp: , Rfl:   •  b complex vitamins tablet, No SIG Entered, Disp: , Rfl:   •  blood sugar diagnostic (CONTOUR NEXT TEST STRIPS) strip, USE TO CHECK BG 3 TIMES A DAY, Disp: 100 strip, Rfl: 3  •  cholecalciferol, vitamin D3, (VITAMIN D3) 1,000 unit capsule,  One tablet daily, Disp: , Rfl:   •  dulaglutide (TRULICITY) 1.5 mg/0.5 mL pen injector, Inject 0.5 mL (1.5 mg total) under the skin once a week., Disp: 6 mL, Rfl: 0  •  estradiol (IMVEXXY STARTER PACK) 10 mcg insert, dose pack, Insert 10 mcg into the vagina daily., Disp: 38 each, Rfl: 6  •  folic acid (FOLVITE) 1 mg tablet, Take 1 mg by mouth daily., Disp: , Rfl:   •  lancets (MICROLET LANCET) misc, Check blood glucose 4 times a day, Disp: 150 each, Rfl: 11  •  metFORMIN (GLUCOPHAGE) 500 mg tablet, TAKE 2 TABLETS BY MOUTH TWICE A DAY WITH MEALS (Patient taking differently: 500 mg. 3 tabs daily ), Disp: 360 tablet, Rfl: 3  •  omega-3 fatty acids-fish oil (omega-3) 300-1,000 mg capsule, take 1 capsule daily, Disp: , Rfl:   •  quinapril (ACCUPRIL) 10 mg tablet, Take 2 tablets (20 mg total) by mouth once daily., Disp: 180 tablet, Rfl: 3  •  sertraline (ZOLOFT) 100 mg tablet, Take 100 mg by mouth every evening., Disp: , Rfl: 2  •  simvastatin (ZOCOR) 20 mg tablet, Take 1 tablet (20 mg total) by mouth nightly., Disp: 90 tablet, Rfl: 3  •  zinc sulfate (ZINCATE) 220 (50) mg capsule, Take 220 mg by mouth daily., Disp: , Rfl:   •  acarbose (PRECOSE) 50 mg tablet, Take 1 tablet (50 mg total) by mouth 3 (three) times a day with meals. (Patient not taking: Reported on 9/3/2020 ), Disp: 270 tablet, Rfl: 3  •  azelastine (ASTELIN) 137 mcg (0.1 %) nasal spray, Administer 1 spray into each nostril daily. Use in each nostril as directed., Disp: , Rfl:   •  DYMISTA 137-50 mcg/spray nasal spray, Administer 1 spray into each nostril 2 (two) times a day., Disp: , Rfl: 3  •  estradiol (YUVAFEM) 10 mcg tablet, Insert 1 tablet (10 mcg total) into the vagina 2 (two) times a week (Mon, Thu). (Patient not taking: Reported on 8/13/2019 ), Disp: 24 tablet, Rfl: 6  •  IMVEXXY MAINTENANCE PACK 10 mcg insert, Insert 10 mcg into the vagina 2 (two) times a week (Mon, Thu). (Patient not taking: Reported on 9/3/2020 ), Disp: 1 each, Rfl: 6  •   "LORazepam (ATIVAN) 0.5 mg tablet, Take 1 tablet (0.5 mg total) by mouth once for 1 dose. Before MRI (Patient not taking: Reported on 2/19/2020 ), Disp: 2 tablet, Rfl: 0  •  meclizine (ANTIVERT) 25 mg tablet, Take 1 tablet (25 mg total) by mouth 2 (two) times a day as needed for dizziness. (Patient not taking: Reported on 4/9/2020 ), Disp: 60 tablet, Rfl: 4      Objective     Vitals:    09/03/20 1334   BP: 116/68   BP Location: Left upper arm   Patient Position: Sitting   Pulse: 68   Resp: 18   Temp: 36.8 °C (98.2 °F)   SpO2: 98%   Weight: 95.3 kg (210 lb)   Height: 1.549 m (5' 1\")     Body mass index is 39.68 kg/m².     Physical Exam   Constitutional: She is oriented to person, place, and time.   obese   HENT:   Head: Normocephalic and atraumatic.   Eyes: Pupils are equal, round, and reactive to light. EOM are normal.   Neck: Neck supple. No thyromegaly present.   Cardiovascular: Normal rate and normal heart sounds.   Pulmonary/Chest: Effort normal and breath sounds normal.   Abdominal: Soft.   Neurological: She is alert and oriented to person, place, and time.   Skin: Skin is warm and dry.   Psychiatric: She has a normal mood and affect.       Hemoglobin A1C   Date Value Ref Range Status   06/16/2020 6.8 (H) <5.7 % of total Hgb Final     Comment:     For someone without known diabetes, a hemoglobin A1c  value of 6.5% or greater indicates that they may have   diabetes and this should be confirmed with a follow-up   test.     For someone with known diabetes, a value <7% indicates   that their diabetes is well controlled and a value   greater than or equal to 7% indicates suboptimal   control. A1c targets should be individualized based on   duration of diabetes, age, comorbid conditions, and   other considerations.     Currently, no consensus exists regarding use of  hemoglobin A1c for diagnosis of diabetes for children.         03/10/2020 6.3 (H) <5.7 % of total Hgb Final     Comment:     For someone without known " diabetes, a hemoglobin   A1c value between 5.7% and 6.4% is consistent with  prediabetes and should be confirmed with a   follow-up test.     For someone with known diabetes, a value <7%  indicates that their diabetes is well controlled. A1c  targets should be individualized based on duration of  diabetes, age, comorbid conditions, and other  considerations.     This assay result is consistent with an increased risk  of diabetes.     Currently, no consensus exists regarding use of  hemoglobin A1c for diagnosis of diabetes for children.        08/08/2019 6.5 (H) <5.7 % of total Hgb Final     Comment:     For someone without known diabetes, a hemoglobin A1c  value of 6.5% or greater indicates that they may have   diabetes and this should be confirmed with a follow-up   test.     For someone with known diabetes, a value <7% indicates   that their diabetes is well controlled and a value   greater than or equal to 7% indicates suboptimal   control. A1c targets should be individualized based on   duration of diabetes, age, comorbid conditions, and   other considerations.     Currently, no consensus exists regarding use of  hemoglobin A1c for diagnosis of diabetes for children.           Glucose   Date Value Ref Range Status   06/16/2020 158 (H) 65 - 99 mg/dL Final     Comment:                   Fasting reference interval     For someone without known diabetes, a glucose  value >125 mg/dL indicates that they may have  diabetes and this should be confirmed with a  follow-up test.        03/10/2020 120 (H) 65 - 99 mg/dL Final     Comment:                   Fasting reference interval     For someone without known diabetes, a glucose value  between 100 and 125 mg/dL is consistent with  prediabetes and should be confirmed with a  follow-up test.        08/08/2019 84 65 - 99 mg/dL Final     Comment:                   Fasting reference interval          Sodium   Date Value Ref Range Status   06/16/2020 140 135 - 146 mmol/L Final    03/10/2020 140 135 - 146 mmol/L Final   08/08/2019 138 135 - 146 mmol/L Final     Potassium   Date Value Ref Range Status   06/16/2020 4.3 3.5 - 5.3 mmol/L Final   03/10/2020 4.3 3.5 - 5.3 mmol/L Final   08/08/2019 4.5 3.5 - 5.3 mmol/L Final     Carbon Dioxide   Date Value Ref Range Status   06/16/2020 24 20 - 32 mmol/L Final   03/10/2020 26 20 - 32 mmol/L Final   08/08/2019 25 20 - 32 mmol/L Final     Chloride   Date Value Ref Range Status   06/16/2020 105 98 - 110 mmol/L Final   03/10/2020 106 98 - 110 mmol/L Final   08/08/2019 102 98 - 110 mmol/L Final     BUN   Date Value Ref Range Status   06/16/2020 19 7 - 25 mg/dL Final   03/10/2020 19 7 - 25 mg/dL Final   08/08/2019 12 7 - 25 mg/dL Final     Egfr Non-Afr. American   Date Value Ref Range Status   06/16/2020 85 > OR = 60 mL/min/1.73m2 Final   03/10/2020 85 > OR = 60 mL/min/1.73m2 Final     Egfr    Date Value Ref Range Status   06/16/2020 98 > OR = 60 mL/min/1.73m2 Final   03/10/2020 98 > OR = 60 mL/min/1.73m2 Final     Creatinine   Date Value Ref Range Status   06/16/2020 0.75 0.50 - 0.99 mg/dL Final     Comment:     For patients >49 years of age, the reference limit  for Creatinine is approximately 13% higher for people  identified as -American.        03/10/2020 0.75 0.50 - 0.99 mg/dL Final     Comment:     For patients >49 years of age, the reference limit  for Creatinine is approximately 13% higher for people  identified as -American.        08/08/2019 0.91 0.50 - 0.99 mg/dL Final     Comment:     For patients >49 years of age, the reference limit  for Creatinine is approximately 13% higher for people  identified as -American.          Calcium, Serum   Date Value Ref Range Status   09/04/2015 10.1 8.7 - 10.2 mg/dL      Alt   Date Value Ref Range Status   06/16/2020 89 (H) 6 - 29 U/L Final   03/10/2020 79 (H) 6 - 29 U/L Final   08/08/2019 39 (H) 6 - 29 U/L Final     Cholesterol, Total   Date Value Ref Range Status    06/16/2020 135 <200 mg/dL Final   03/10/2020 140 <200 mg/dL Final   08/08/2019 199 <200 mg/dL Final     Hdl Cholesterol   Date Value Ref Range Status   06/16/2020 46 (L) > OR = 50 mg/dL Final   03/10/2020 48 (L) > OR = 50 mg/dL Final   08/08/2019 60 >50 mg/dL Final     Triglycerides   Date Value Ref Range Status   06/16/2020 151 (H) <150 mg/dL Final   03/10/2020 112 <150 mg/dL Final   08/08/2019 71 <150 mg/dL Final     TSH   Date Value Ref Range Status   11/08/2016 2.22 0.40 - 4.50 mIU/L      Comment:     Test performed at [facility] [place]  [id number] Director: SAMREEN COREY MD PHD   10/26/2015 2.10 0.34 - 5.6 mIU/L Final   09/04/2015 2.390 0.450 - 4.500 uIU/mL        Lab Results   Component Value Date    TSH 2.22 11/08/2016    FREET4 0.86 10/26/2015       Lab Results   Component Value Date    HGBA1C 6.8 (H) 06/16/2020       Lab Results   Component Value Date    GLUCOSE 158 (H) 06/16/2020    BUN 19 06/16/2020    CREATININE 0.75 06/16/2020    EGFR 85 06/16/2020    EGFR 98 06/16/2020    BUNCREAT 23 09/04/2015     06/16/2020    K 4.3 06/16/2020     06/16/2020    CO2 24 06/16/2020    CA 10.1 09/04/2015    PROTEIN 6.9 06/16/2020    ALBUMIN 4.0 06/16/2020    GLOB 2.9 06/16/2020    AGRATIO 1.4 06/16/2020    BILITOT 0.4 06/16/2020    ALKPHOS 54 06/16/2020    AST 44 (H) 06/16/2020    ALT 89 (H) 06/16/2020       Lab Results   Component Value Date     06/16/2020    K 4.3 06/16/2020     06/16/2020    CO2 24 06/16/2020    BUN 19 06/16/2020    CREATININE 0.75 06/16/2020    GLUCOSE 158 (H) 06/16/2020    CALCIUM 9.5 06/16/2020    EGFR 85 06/16/2020    EGFR 98 06/16/2020    ANIONGAP 10 12/05/2016       Lab Results   Component Value Date    CHOL 135 06/16/2020    TRIG 151 (H) 06/16/2020    HDL 46 (L) 06/16/2020    VLDL 27 09/04/2015    LDLCALC 66 06/16/2020       Lab Results   Component Value Date    CREATUR 138.2 09/04/2015    MICROALBUR 0.9 08/08/2019               Assessment/Plan     Type 2 diabetes mellitus without complication (CMS/HCC) (HCC)  Diabetes mellitus type 2 at goal in 6/20 but recent reported BGs suggestes worsening glycemic control   2/2 to social stressors and poor compliance with current meds.   Reviewed all diabetic blood testing results  Discussed home glucose and A1c goals  Rec ophtho, dental, podiatry, nutrition visits  Check testing 2 times per day  Increase metformin to 1g BID (from 1500mg daily)  Cont Trulicity 1.5 mg weekly  Acarbose 50 mg pre meal TIDAC (encouraged compliance).   Discussed med side effects and patient accepts risks    Dyslipidemia  LDL at goal cont,  simvastatin 20 mg daily  Yearly FLP check  Discussed med side effects    Essential hypertension  HTN at goal cont Quinapril 10 mg daily  Check yearly urine mc    There are no diagnoses linked to this encounter.

## 2020-09-04 DIAGNOSIS — E11.9 TYPE 2 DIABETES MELLITUS WITHOUT COMPLICATION, UNSPECIFIED WHETHER LONG TERM INSULIN USE (CMS/HCC): ICD-10-CM

## 2020-09-04 RX ORDER — DEXTROSE 4 G
TABLET,CHEWABLE ORAL
Qty: 1 EACH | Refills: 0 | Status: SHIPPED | OUTPATIENT
Start: 2020-09-04 | End: 2022-04-14

## 2020-09-04 RX ORDER — LANCETS
EACH MISCELLANEOUS
Qty: 150 EACH | Refills: 11 | Status: SHIPPED | OUTPATIENT
Start: 2020-09-04 | End: 2021-11-02

## 2020-09-29 ENCOUNTER — APPOINTMENT (OUTPATIENT)
Dept: URBAN - METROPOLITAN AREA CLINIC 200 | Age: 65
Setting detail: DERMATOLOGY
End: 2020-10-04

## 2020-09-29 DIAGNOSIS — L70.8 OTHER ACNE: ICD-10-CM

## 2020-09-29 DIAGNOSIS — T07XXXA ABRASION OR FRICTION BURN OF OTHER, MULTIPLE, AND UNSPECIFIED SITES, WITHOUT MENTION OF INFECTION: ICD-10-CM

## 2020-09-29 DIAGNOSIS — L57.8 OTHER SKIN CHANGES DUE TO CHRONIC EXPOSURE TO NONIONIZING RADIATION: ICD-10-CM

## 2020-09-29 DIAGNOSIS — B35.1 TINEA UNGUIUM: ICD-10-CM

## 2020-09-29 PROBLEM — D23.71 OTHER BENIGN NEOPLASM OF SKIN OF RIGHT LOWER LIMB, INCLUDING HIP: Status: ACTIVE | Noted: 2020-09-29

## 2020-09-29 PROBLEM — L70.0 ACNE VULGARIS: Status: ACTIVE | Noted: 2020-09-29

## 2020-09-29 PROBLEM — F41.9 ANXIETY DISORDER, UNSPECIFIED: Status: ACTIVE | Noted: 2020-09-29

## 2020-09-29 PROBLEM — S40.811A ABRASION OF RIGHT UPPER ARM, INITIAL ENCOUNTER: Status: ACTIVE | Noted: 2020-09-29

## 2020-09-29 PROCEDURE — OTHER REASSURANCE: OTHER

## 2020-09-29 PROCEDURE — OTHER COUNSELING: OTHER

## 2020-09-29 PROCEDURE — 99213 OFFICE O/P EST LOW 20 MIN: CPT

## 2020-09-29 PROCEDURE — OTHER PRESCRIPTION MEDICATION MANAGEMENT: OTHER

## 2020-09-29 PROCEDURE — OTHER ORDER TESTS: OTHER

## 2020-09-29 ASSESSMENT — LOCATION SIMPLE DESCRIPTION DERM
LOCATION SIMPLE: RIGHT GREAT TOE
LOCATION SIMPLE: CHEST
LOCATION SIMPLE: LEFT GREAT TOE
LOCATION SIMPLE: LEFT 2ND TOE
LOCATION SIMPLE: RIGHT POSTERIOR UPPER ARM

## 2020-09-29 ASSESSMENT — LOCATION ZONE DERM
LOCATION ZONE: ARM
LOCATION ZONE: TRUNK
LOCATION ZONE: TOENAIL

## 2020-09-29 ASSESSMENT — LOCATION DETAILED DESCRIPTION DERM
LOCATION DETAILED: LEFT MEDIAL SUPERIOR CHEST
LOCATION DETAILED: RIGHT DISTAL POSTERIOR UPPER ARM
LOCATION DETAILED: LEFT LATERAL SUPERIOR CHEST
LOCATION DETAILED: RIGHT GREAT TOENAIL
LOCATION DETAILED: LEFT 2ND TOENAIL
LOCATION DETAILED: LEFT GREAT TOENAIL

## 2020-09-29 NOTE — PROCEDURE: PRESCRIPTION MEDICATION MANAGEMENT
Detail Level: Detailed
Initiate Treatment: Augmented betamethasone cream.
Render In Strict Bullet Format?: No

## 2020-10-06 ENCOUNTER — OFFICE VISIT (OUTPATIENT)
Dept: OBSTETRICS AND GYNECOLOGY | Facility: CLINIC | Age: 65
End: 2020-10-06
Payer: COMMERCIAL

## 2020-10-06 VITALS
DIASTOLIC BLOOD PRESSURE: 88 MMHG | HEIGHT: 61 IN | BODY MASS INDEX: 40.67 KG/M2 | WEIGHT: 215.4 LBS | SYSTOLIC BLOOD PRESSURE: 168 MMHG

## 2020-10-06 DIAGNOSIS — Z01.419 WELL WOMAN EXAM WITH ROUTINE GYNECOLOGICAL EXAM: ICD-10-CM

## 2020-10-06 DIAGNOSIS — Z12.4 ROUTINE CERVICAL SMEAR: Primary | ICD-10-CM

## 2020-10-06 PROCEDURE — 99396 PREV VISIT EST AGE 40-64: CPT | Performed by: OBSTETRICS & GYNECOLOGY

## 2020-10-06 PROCEDURE — 87624 HPV HI-RISK TYP POOLED RSLT: CPT | Performed by: OBSTETRICS & GYNECOLOGY

## 2020-10-06 PROCEDURE — G0145 SCR C/V CYTO,THINLAYER,RESCR: HCPCS | Performed by: OBSTETRICS & GYNECOLOGY

## 2020-10-20 LAB
CASE RPRT: NORMAL
CLINICAL INFO: NORMAL
CLINICAL INFO: NORMAL
HPV HR 12 DNA CVX QL NAA+PROBE: NEGATIVE
HPV16 DNA SPEC QL NAA+PROBE: NEGATIVE
HPV18 DNA SPEC QL NAA+PROBE: NEGATIVE
LMP START DATE: NORMAL
SPECIMEN PROCESSING COMMENT: NORMAL
THIN PREP CVX: NORMAL

## 2020-10-22 DIAGNOSIS — E11.9 TYPE 2 DIABETES MELLITUS WITHOUT COMPLICATION, WITHOUT LONG-TERM CURRENT USE OF INSULIN (CMS/HCC): ICD-10-CM

## 2020-10-22 RX ORDER — DULAGLUTIDE 1.5 MG/.5ML
1.5 INJECTION, SOLUTION SUBCUTANEOUS WEEKLY
Qty: 6 ML | Refills: 1 | Status: SHIPPED | OUTPATIENT
Start: 2020-10-22 | End: 2021-01-12

## 2020-10-22 NOTE — TELEPHONE ENCOUNTER
Medicine Refill Request    Last Office Visit: 9/3/2020  Last Telemedicine Visit: 4/9/2020 Ana Vazquez MD    Next Office Visit: 12/3/2020  Next Telemedicine Visit: Visit date not found         Current Outpatient Medications:   •  acarbose (PRECOSE) 50 mg tablet, TAKE 1 TABLET BY ORAL ROUTE 3 TIMES EVERY DAY AT THE START (WITH THE FIRST BITE) OF EACH MAIN MEAL, Disp: 270 tablet, Rfl: 3  •  acarbose (PRECOSE) 50 mg tablet, Take 1 tablet (50 mg total) by mouth 3 (three) times a day with meals. (Patient not taking: Reported on 9/3/2020 ), Disp: 270 tablet, Rfl: 3  •  aspirin (ASPIR-81) 81 mg enteric coated tablet, take 1 tablet by oral route  every day, Disp: , Rfl:   •  azelastine (ASTELIN) 137 mcg (0.1 %) nasal spray, Administer 1 spray into each nostril daily. Use in each nostril as directed., Disp: , Rfl:   •  b complex vitamins tablet, No SIG Entered, Disp: , Rfl:   •  blood sugar diagnostic (CONTOUR NEXT TEST STRIPS) strip, USE TO CHECK BG 3 TIMES A DAY, Disp: 100 strip, Rfl: 3  •  blood-glucose meter (CONTOUR NEXT METER) misc, To test 4x daily, Disp: 1 each, Rfl: 0  •  cholecalciferol, vitamin D3, (VITAMIN D3) 1,000 unit capsule, One tablet daily, Disp: , Rfl:   •  dulaglutide (TRULICITY) 1.5 mg/0.5 mL pen injector, Inject 0.5 mL (1.5 mg total) under the skin once a week., Disp: 6 mL, Rfl: 0  •  DYMISTA 137-50 mcg/spray nasal spray, Administer 1 spray into each nostril 2 (two) times a day., Disp: , Rfl: 3  •  estradiol (IMVEXXY STARTER PACK) 10 mcg insert, dose pack, Insert 10 mcg into the vagina daily., Disp: 38 each, Rfl: 6  •  estradiol (YUVAFEM) 10 mcg tablet, Insert 1 tablet (10 mcg total) into the vagina 2 (two) times a week (Mon, Thu). (Patient not taking: Reported on 8/13/2019 ), Disp: 24 tablet, Rfl: 6  •  folic acid (FOLVITE) 1 mg tablet, Take 1 mg by mouth daily., Disp: , Rfl:   •  IMVEXXY MAINTENANCE PACK 10 mcg insert, Insert 10 mcg into the vagina 2 (two) times a week (Mon, Thu). (Patient not  taking: Reported on 9/3/2020 ), Disp: 1 each, Rfl: 6  •  lancets (MICROLET LANCET) misc, Check blood glucose 4 times a day, Disp: 150 each, Rfl: 11  •  LORazepam (ATIVAN) 0.5 mg tablet, Take 1 tablet (0.5 mg total) by mouth once for 1 dose. Before MRI (Patient not taking: Reported on 2/19/2020 ), Disp: 2 tablet, Rfl: 0  •  meclizine (ANTIVERT) 25 mg tablet, Take 1 tablet (25 mg total) by mouth 2 (two) times a day as needed for dizziness. (Patient not taking: Reported on 4/9/2020 ), Disp: 60 tablet, Rfl: 4  •  metFORMIN (GLUCOPHAGE) 500 mg tablet, TAKE 2 TABLETS BY MOUTH TWICE A DAY WITH MEALS (Patient taking differently: 500 mg. 3 tabs daily ), Disp: 360 tablet, Rfl: 3  •  omega-3 fatty acids-fish oil (omega-3) 300-1,000 mg capsule, take 1 capsule daily, Disp: , Rfl:   •  quinapril (ACCUPRIL) 10 mg tablet, Take 2 tablets (20 mg total) by mouth once daily., Disp: 180 tablet, Rfl: 3  •  sertraline (ZOLOFT) 100 mg tablet, Take 100 mg by mouth every evening., Disp: , Rfl: 2  •  simvastatin (ZOCOR) 20 mg tablet, Take 1 tablet (20 mg total) by mouth nightly., Disp: 90 tablet, Rfl: 3  •  zinc sulfate (ZINCATE) 220 (50) mg capsule, Take 220 mg by mouth daily., Disp: , Rfl:       BP Readings from Last 3 Encounters:   10/06/20 (!) 168/88   09/03/20 116/68   02/19/20 137/82       Recent Lab results:  Lab Results   Component Value Date    CHOL 135 06/16/2020   ,   Lab Results   Component Value Date    HDL 46 (L) 06/16/2020   ,   Lab Results   Component Value Date    LDLCALC 66 06/16/2020   ,   Lab Results   Component Value Date    TRIG 151 (H) 06/16/2020        Lab Results   Component Value Date    GLUCOSE 158 (H) 06/16/2020   ,   Lab Results   Component Value Date    HGBA1C 6.8 (H) 06/16/2020         Lab Results   Component Value Date    CREATININE 0.75 06/16/2020       Lab Results   Component Value Date    TSH 2.22 11/08/2016

## 2020-10-25 NOTE — PROGRESS NOTES
Jaxon she is 64 years old    Mood she is on Lexapro her mom recently  from Covid    Urine J noncontributory    Heart lungs noncontributory she had a calcium index 2015 of 0 she does have a heart murmur which is being followed by [facility]    Gynecologic she had a ANDRES/BSO for ovarian hyperthecosis elevated testosterone levels she presently uses Imvexxy for vaginal dryness    Mammogram was last Monday    Colonoscopy she gets every 3 years    Labs per PCP she has high cholesterol diabetes she has fatty liver for which she had an ultrasound done    DEXA scan not recorded    Neck was supple no masses    Breast exam no masses    Abdomen soft nontender    Normal external female genitalia atrophic no adnexal masses appreciated uterus was surgically removed    Heme-negative    Patient is to attempt weight loss we reiterated different programs that she might be able to participate in should be seen back in 1 year

## 2020-11-25 ENCOUNTER — TELEPHONE (OUTPATIENT)
Dept: ENDOCRINOLOGY | Facility: CLINIC | Age: 65
End: 2020-11-25

## 2020-12-01 ENCOUNTER — TELEPHONE (OUTPATIENT)
Dept: ENDOCRINOLOGY | Facility: CLINIC | Age: 65
End: 2020-12-01

## 2020-12-03 NOTE — TELEPHONE ENCOUNTER
Left message asking if she completed bw and if not to please call and miko. Tried home number which went dead. Called and left message on mobile.

## 2020-12-16 LAB
25(OH)D3 SERPL-MCNC: 24 NG/ML (ref 30–100)
ALBUMIN SERPL-MCNC: 4.2 G/DL (ref 3.6–5.1)
ALBUMIN/CREAT UR: 10 MCG/MG CREAT
ALBUMIN/GLOB SERPL: 1.5 (CALC) (ref 1–2.5)
ALP SERPL-CCNC: 55 U/L (ref 37–153)
ALT SERPL-CCNC: 89 U/L (ref 6–29)
AST SERPL-CCNC: 56 U/L (ref 10–35)
BASOPHILS # BLD AUTO: 40 CELLS/UL (ref 0–200)
BASOPHILS NFR BLD AUTO: 0.6 %
BILIRUB SERPL-MCNC: 0.4 MG/DL (ref 0.2–1.2)
BUN SERPL-MCNC: 20 MG/DL (ref 7–25)
BUN/CREAT SERPL: ABNORMAL (CALC) (ref 6–22)
CALCIUM SERPL-MCNC: 9.1 MG/DL (ref 8.6–10.4)
CHLORIDE SERPL-SCNC: 108 MMOL/L (ref 98–110)
CO2 SERPL-SCNC: 23 MMOL/L (ref 20–32)
CREAT SERPL-MCNC: 0.66 MG/DL (ref 0.5–0.99)
CREAT UR-MCNC: 116 MG/DL (ref 20–275)
EOSINOPHIL # BLD AUTO: 101 CELLS/UL (ref 15–500)
EOSINOPHIL NFR BLD AUTO: 1.5 %
ERYTHROCYTE [DISTWIDTH] IN BLOOD BY AUTOMATED COUNT: 13.7 % (ref 11–15)
GLOBULIN SER CALC-MCNC: 2.8 G/DL (CALC) (ref 1.9–3.7)
GLUCOSE SERPL-MCNC: 137 MG/DL (ref 65–99)
HBA1C MFR BLD: 6.8 % OF TOTAL HGB
HCT VFR BLD AUTO: 43.9 % (ref 35–45)
HGB BLD-MCNC: 14.4 G/DL (ref 11.7–15.5)
LYMPHOCYTES # BLD AUTO: 2057 CELLS/UL (ref 850–3900)
LYMPHOCYTES NFR BLD AUTO: 30.7 %
MCH RBC QN AUTO: 29.3 PG (ref 27–33)
MCHC RBC AUTO-ENTMCNC: 32.8 G/DL (ref 32–36)
MCV RBC AUTO: 89.4 FL (ref 80–100)
MICROALBUMIN UR-MCNC: 1.2 MG/DL
MONOCYTES # BLD AUTO: 375 CELLS/UL (ref 200–950)
MONOCYTES NFR BLD AUTO: 5.6 %
NEUTROPHILS # BLD AUTO: 4127 CELLS/UL (ref 1500–7800)
NEUTROPHILS NFR BLD AUTO: 61.6 %
PLATELET # BLD AUTO: 269 THOUSAND/UL (ref 140–400)
PMV BLD REES-ECKER: 10.1 FL (ref 7.5–12.5)
POTASSIUM SERPL-SCNC: 4.3 MMOL/L (ref 3.5–5.3)
PROT SERPL-MCNC: 7 G/DL (ref 6.1–8.1)
QUEST EGFR NON-AFR. AMERICAN: 94 ML/MIN/1.73M2
RBC # BLD AUTO: 4.91 MILLION/UL (ref 3.8–5.1)
SODIUM SERPL-SCNC: 139 MMOL/L (ref 135–146)
TSH SERPL-ACNC: 3.03 MIU/L (ref 0.4–4.5)
WBC # BLD AUTO: 6.7 THOUSAND/UL (ref 3.8–10.8)

## 2021-01-04 ENCOUNTER — TELEPHONE (OUTPATIENT)
Dept: ENDOCRINOLOGY | Facility: CLINIC | Age: 66
End: 2021-01-04

## 2021-01-12 ENCOUNTER — OFFICE VISIT (OUTPATIENT)
Dept: ENDOCRINOLOGY | Facility: CLINIC | Age: 66
End: 2021-01-12
Payer: COMMERCIAL

## 2021-01-12 VITALS
HEART RATE: 77 BPM | HEIGHT: 61 IN | DIASTOLIC BLOOD PRESSURE: 74 MMHG | WEIGHT: 215 LBS | OXYGEN SATURATION: 98 % | BODY MASS INDEX: 40.59 KG/M2 | SYSTOLIC BLOOD PRESSURE: 122 MMHG

## 2021-01-12 DIAGNOSIS — I10 ESSENTIAL HYPERTENSION: ICD-10-CM

## 2021-01-12 DIAGNOSIS — E55.9 HYPOVITAMINOSIS D: ICD-10-CM

## 2021-01-12 DIAGNOSIS — E78.5 DYSLIPIDEMIA: Primary | ICD-10-CM

## 2021-01-12 DIAGNOSIS — Z79.4 TYPE 2 DIABETES MELLITUS WITHOUT COMPLICATION, WITH LONG-TERM CURRENT USE OF INSULIN (CMS/HCC): ICD-10-CM

## 2021-01-12 DIAGNOSIS — E11.9 TYPE 2 DIABETES MELLITUS WITHOUT COMPLICATION, WITH LONG-TERM CURRENT USE OF INSULIN (CMS/HCC): ICD-10-CM

## 2021-01-12 DIAGNOSIS — R79.89 ABNORMAL LFTS: ICD-10-CM

## 2021-01-12 LAB — GLUCOSE BLOOD, POC: 132

## 2021-01-12 PROCEDURE — 82962 GLUCOSE BLOOD TEST: CPT | Performed by: INTERNAL MEDICINE

## 2021-01-12 PROCEDURE — 99215 OFFICE O/P EST HI 40 MIN: CPT | Performed by: INTERNAL MEDICINE

## 2021-01-12 ASSESSMENT — ENCOUNTER SYMPTOMS
ABDOMINAL DISTENTION: 1
UNEXPECTED WEIGHT CHANGE: 0
CARDIOVASCULAR NEGATIVE: 1
EYES NEGATIVE: 1
RESPIRATORY NEGATIVE: 1
NERVOUS/ANXIOUS: 0

## 2021-01-12 NOTE — PROGRESS NOTES
Subjective      Patient ID: Citlali Gifford is a 65 y.o. female here for DM2, HTN, HLD f/u visit.        Case Hx   Initial diagnosis: 2014-15  Family history: Maternal side.   Previous endocrinologist: Dr. Samson  Started on treatment:  Since diagnosis.   No hx of thyroid cancer or pancreatitis. Reported.     Jan 2021 Visit.   Current regimen: Metformin 1g BID , Trulicity 1.5q weekly, Acarbose 50mg BID AC   Compliance: Misses acarbose often , takes only 1 -2 pills daily.   Proper pill / insulin technique:  NA  History of insulin use: NA   Failed / AE to meds in past: Jardiance causing yeast infections.   Check BG once daily, FBS 130s-160s  Hypoglycemia: NA  Hypoglycemia awareness / treatment: NA  Complications: None reported  Last eye exam:  10/2020  Foot exam: WNL in 8/2020  Taking ACEi and Statin.   Exercise: NA  Weight changes: Increasing last few weeks due to social stressors.   Diet:  B: Protein+ Carb  L: Vegetables+ protein  D: Protein + vegetable + starch.   Seen DM educator / nutritionist in past: No  The following have been reviewed and updated as appropriate in this visit:    Has HLD takes Simvastatin 20mg daily.   Has HTN, takes quinalapril 10mg daily.     Has hx  Of abnormal liver function tests for which she seeing GI.            Review of Systems   Constitutional: Negative for unexpected weight change.   HENT: Negative.    Eyes: Negative.    Respiratory: Negative.    Cardiovascular: Negative.    Gastrointestinal: Positive for abdominal distention.   Psychiatric/Behavioral: The patient is not nervous/anxious.      Wt Readings from Last 10 Encounters:   01/12/21 97.5 kg (215 lb)   10/06/20 97.7 kg (215 lb 6.4 oz)   09/03/20 95.3 kg (210 lb)   02/19/20 98 kg (216 lb 0.8 oz)   02/19/20 95.3 kg (210 lb)   08/13/19 98.2 kg (216 lb 6.4 oz)   06/26/19 97.3 kg (214 lb 6.4 oz)   04/29/19 97.5 kg (215 lb)   03/11/19 99.8 kg (220 lb)   08/16/18 96.8 kg (213 lb 6.4 oz)     Family History   Problem Relation Age of  Onset   • Colon cancer Paternal Grandfather    • Diabetes Biological Mother    • Heart attack Biological Father        Social History     Socioeconomic History   • Marital status:      Spouse name: Not on file   • Number of children: Not on file   • Years of education: Not on file   • Highest education level: Not on file   Occupational History   • Not on file   Social Needs   • Financial resource strain: Not on file   • Food insecurity     Worry: Not on file     Inability: Not on file   • Transportation needs     Medical: Not on file     Non-medical: Not on file   Tobacco Use   • Smoking status: Never Smoker   • Smokeless tobacco: Never Used   Substance and Sexual Activity   • Alcohol use: Yes     Comment: ocasional   • Drug use: No   • Sexual activity: Not on file     Comment:    Lifestyle   • Physical activity     Days per week: Not on file     Minutes per session: Not on file   • Stress: Not on file   Relationships   • Social connections     Talks on phone: Not on file     Gets together: Not on file     Attends [employer] service: Not on file     Active member of club or organization: Not on file     Attends meetings of clubs or organizations: Not on file     Relationship status: Not on file   • Intimate partner violence     Fear of current or ex partner: Not on file     Emotionally abused: Not on file     Physically abused: Not on file     Forced sexual activity: Not on file   Other Topics Concern   • Not on file   Social History Narrative   • Not on file         Current Outpatient Medications:   •  acarbose (PRECOSE) 50 mg tablet, Take 1 tablet (50 mg total) by mouth 3 (three) times a day with meals., Disp: 270 tablet, Rfl: 3  •  aspirin (ASPIR-81) 81 mg enteric coated tablet, take 1 tablet by oral route  every day, Disp: , Rfl:   •  azelastine (ASTELIN) 137 mcg (0.1 %) nasal spray, Administer 1 spray into each nostril daily. Use in each nostril as directed., Disp: , Rfl:   •  b complex vitamins  tablet, No SIG Entered, Disp: , Rfl:   •  blood sugar diagnostic (CONTOUR NEXT TEST STRIPS) strip, USE TO CHECK BG 3 TIMES A DAY, Disp: 100 strip, Rfl: 3  •  blood-glucose meter (CONTOUR NEXT METER) misc, To test 4x daily, Disp: 1 each, Rfl: 0  •  cholecalciferol, vitamin D3, (VITAMIN D3) 1,000 unit capsule, One tablet daily, Disp: , Rfl:   •  dulaglutide (TRULICITY) 1.5 mg/0.5 mL pen injector, Inject 0.5 mL (1.5 mg total) under the skin once a week., Disp: 6 mL, Rfl: 1  •  estradiol (IMVEXXY STARTER PACK) 10 mcg insert, dose pack, Insert 10 mcg into the vagina daily., Disp: 38 each, Rfl: 6  •  lancets (MICROLET LANCET) misc, Check blood glucose 4 times a day, Disp: 150 each, Rfl: 11  •  metFORMIN (GLUCOPHAGE) 500 mg tablet, TAKE 2 TABLETS BY MOUTH TWICE A DAY WITH MEALS (Patient taking differently: 500 mg. 3 tabs daily ), Disp: 360 tablet, Rfl: 3  •  omega-3 fatty acids-fish oil (omega-3) 300-1,000 mg capsule, take 1 capsule daily, Disp: , Rfl:   •  quinapril (ACCUPRIL) 10 mg tablet, Take 2 tablets (20 mg total) by mouth once daily., Disp: 180 tablet, Rfl: 3  •  resvera/chrom/gr.tea/EGCG/dig3 (RESVERATROL DIET ORAL), Take by mouth., Disp: , Rfl:   •  sertraline (ZOLOFT) 100 mg tablet, Take 100 mg by mouth every evening., Disp: , Rfl: 2  •  simvastatin (ZOCOR) 20 mg tablet, Take 1 tablet (20 mg total) by mouth nightly., Disp: 90 tablet, Rfl: 3  •  zinc sulfate (ZINCATE) 220 (50) mg capsule, Take 220 mg by mouth daily., Disp: , Rfl:   •  acarbose (PRECOSE) 50 mg tablet, TAKE 1 TABLET BY ORAL ROUTE 3 TIMES EVERY DAY AT THE START (WITH THE FIRST BITE) OF EACH MAIN MEAL, Disp: 270 tablet, Rfl: 3  •  DYMISTA 137-50 mcg/spray nasal spray, Administer 1 spray into each nostril 2 (two) times a day., Disp: , Rfl: 3  •  estradiol (YUVAFEM) 10 mcg tablet, Insert 1 tablet (10 mcg total) into the vagina 2 (two) times a week (Mon, Thu). (Patient not taking: Reported on 8/13/2019 ), Disp: 24 tablet, Rfl: 6  •  folic acid (FOLVITE)  "1 mg tablet, Take 1 mg by mouth daily., Disp: , Rfl:   •  IMVEXXY MAINTENANCE PACK 10 mcg insert, Insert 10 mcg into the vagina 2 (two) times a week (Mon, Thu). (Patient not taking: Reported on 9/3/2020 ), Disp: 1 each, Rfl: 6  •  LORazepam (ATIVAN) 0.5 mg tablet, Take 1 tablet (0.5 mg total) by mouth once for 1 dose. Before MRI (Patient not taking: Reported on 2/19/2020 ), Disp: 2 tablet, Rfl: 0  •  meclizine (ANTIVERT) 25 mg tablet, Take 1 tablet (25 mg total) by mouth 2 (two) times a day as needed for dizziness. (Patient not taking: Reported on 4/9/2020 ), Disp: 60 tablet, Rfl: 4      Objective     Vitals:    01/12/21 0931   BP: 122/74   Pulse: 77   SpO2: 98%   Weight: 97.5 kg (215 lb)   Height: 1.549 m (5' 1\")     Body mass index is 40.62 kg/m².     Physical Exam     Constitutional:       Appearance: Normal appearance. obese  HENT:      Head: Normocephalic.   Eyes:      Extraocular Movements: Extraocular movements intact.   Pulmonary:      Effort: Pulmonary effort is normal.   Musculoskeletal:         General: No swelling or tenderness.   Skin:     General: Skin is warm and dry.   Neurological:      General: No focal deficit present.   Psychiatric:         Behavior: Behavior normal.      DM foot exam with monofilament wnl, Inspection wnl       Hemoglobin A1C   Date Value Ref Range Status   12/15/2020 6.8 (H) <5.7 % of total Hgb Final     Comment:     For someone without known diabetes, a hemoglobin A1c  value of 6.5% or greater indicates that they may have   diabetes and this should be confirmed with a follow-up   test.     For someone with known diabetes, a value <7% indicates   that their diabetes is well controlled and a value   greater than or equal to 7% indicates suboptimal   control. A1c targets should be individualized based on   duration of diabetes, age, comorbid conditions, and   other considerations.     Currently, no consensus exists regarding use of  hemoglobin A1c for diagnosis of diabetes for " children.         06/16/2020 6.8 (H) <5.7 % of total Hgb Final     Comment:     For someone without known diabetes, a hemoglobin A1c  value of 6.5% or greater indicates that they may have   diabetes and this should be confirmed with a follow-up   test.     For someone with known diabetes, a value <7% indicates   that their diabetes is well controlled and a value   greater than or equal to 7% indicates suboptimal   control. A1c targets should be individualized based on   duration of diabetes, age, comorbid conditions, and   other considerations.     Currently, no consensus exists regarding use of  hemoglobin A1c for diagnosis of diabetes for children.         03/10/2020 6.3 (H) <5.7 % of total Hgb Final     Comment:     For someone without known diabetes, a hemoglobin   A1c value between 5.7% and 6.4% is consistent with  prediabetes and should be confirmed with a   follow-up test.     For someone with known diabetes, a value <7%  indicates that their diabetes is well controlled. A1c  targets should be individualized based on duration of  diabetes, age, comorbid conditions, and other  considerations.     This assay result is consistent with an increased risk  of diabetes.     Currently, no consensus exists regarding use of  hemoglobin A1c for diagnosis of diabetes for children.          Glucose   Date Value Ref Range Status   12/15/2020 137 (H) 65 - 99 mg/dL Final     Comment:                   Fasting reference interval     For someone without known diabetes, a glucose  value >125 mg/dL indicates that they may have  diabetes and this should be confirmed with a  follow-up test.        06/16/2020 158 (H) 65 - 99 mg/dL Final     Comment:                   Fasting reference interval     For someone without known diabetes, a glucose  value >125 mg/dL indicates that they may have  diabetes and this should be confirmed with a  follow-up test.        03/10/2020 120 (H) 65 - 99 mg/dL Final     Comment:                    Fasting reference interval     For someone without known diabetes, a glucose value  between 100 and 125 mg/dL is consistent with  prediabetes and should be confirmed with a  follow-up test.          Sodium   Date Value Ref Range Status   12/15/2020 139 135 - 146 mmol/L Final   06/16/2020 140 135 - 146 mmol/L Final   03/10/2020 140 135 - 146 mmol/L Final     Potassium   Date Value Ref Range Status   12/15/2020 4.3 3.5 - 5.3 mmol/L Final   06/16/2020 4.3 3.5 - 5.3 mmol/L Final   03/10/2020 4.3 3.5 - 5.3 mmol/L Final     Carbon Dioxide   Date Value Ref Range Status   12/15/2020 23 20 - 32 mmol/L Final   06/16/2020 24 20 - 32 mmol/L Final   03/10/2020 26 20 - 32 mmol/L Final     Chloride   Date Value Ref Range Status   12/15/2020 108 98 - 110 mmol/L Final   06/16/2020 105 98 - 110 mmol/L Final   03/10/2020 106 98 - 110 mmol/L Final     BUN   Date Value Ref Range Status   12/15/2020 20 7 - 25 mg/dL Final   06/16/2020 19 7 - 25 mg/dL Final   03/10/2020 19 7 - 25 mg/dL Final     Egfr Non-Afr. American   Date Value Ref Range Status   12/15/2020 94 > OR = 60 mL/min/1.73m2 Final   06/16/2020 85 > OR = 60 mL/min/1.73m2 Final     Egfr    Date Value Ref Range Status   12/15/2020 109 > OR = 60 mL/min/1.73m2 Final   06/16/2020 98 > OR = 60 mL/min/1.73m2 Final     Creatinine   Date Value Ref Range Status   12/15/2020 0.66 0.50 - 0.99 mg/dL Final     Comment:     For patients >49 years of age, the reference limit  for Creatinine is approximately 13% higher for people  identified as -American.        06/16/2020 0.75 0.50 - 0.99 mg/dL Final     Comment:     For patients >49 years of age, the reference limit  for Creatinine is approximately 13% higher for people  identified as -American.        03/10/2020 0.75 0.50 - 0.99 mg/dL Final     Comment:     For patients >49 years of age, the reference limit  for Creatinine is approximately 13% higher for people  identified as -American.          Calcium,  Serum   Date Value Ref Range Status   09/04/2015 10.1 8.7 - 10.2 mg/dL      Alt   Date Value Ref Range Status   12/15/2020 89 (H) 6 - 29 U/L Final   06/16/2020 89 (H) 6 - 29 U/L Final   03/10/2020 79 (H) 6 - 29 U/L Final     Cholesterol, Total   Date Value Ref Range Status   06/16/2020 135 <200 mg/dL Final   03/10/2020 140 <200 mg/dL Final   08/08/2019 199 <200 mg/dL Final     Hdl Cholesterol   Date Value Ref Range Status   06/16/2020 46 (L) > OR = 50 mg/dL Final   03/10/2020 48 (L) > OR = 50 mg/dL Final   08/08/2019 60 >50 mg/dL Final     Triglycerides   Date Value Ref Range Status   06/16/2020 151 (H) <150 mg/dL Final   03/10/2020 112 <150 mg/dL Final   08/08/2019 71 <150 mg/dL Final     TSH   Date Value Ref Range Status   12/15/2020 3.03 0.40 - 4.50 mIU/L Final   11/08/2016 2.22 0.40 - 4.50 mIU/L      Comment:     Test performed at [facility] [address]  [id number] Director: SAMREEN COREY MD PHD   10/26/2015 2.10 0.34 - 5.6 mIU/L Final       Lab Results   Component Value Date    TSH 3.03 12/15/2020    FREET4 0.86 10/26/2015       Lab Results   Component Value Date    HGBA1C 6.8 (H) 12/15/2020       Lab Results   Component Value Date    GLUCOSE 137 (H) 12/15/2020    BUN 20 12/15/2020    CREATININE 0.66 12/15/2020    EGFR 94 12/15/2020    EGFR 109 12/15/2020    BUNCREAT 23 09/04/2015     12/15/2020    K 4.3 12/15/2020     12/15/2020    CO2 23 12/15/2020    CA 10.1 09/04/2015    PROTEIN 7.0 12/15/2020    ALBUMIN 4.2 12/15/2020    GLOB 2.8 12/15/2020    AGRATIO 1.5 12/15/2020    BILITOT 0.4 12/15/2020    ALKPHOS 55 12/15/2020    AST 56 (H) 12/15/2020    ALT 89 (H) 12/15/2020       Lab Results   Component Value Date     12/15/2020    K 4.3 12/15/2020     12/15/2020    CO2 23 12/15/2020    BUN 20 12/15/2020    CREATININE 0.66 12/15/2020    GLUCOSE 137 (H) 12/15/2020    CALCIUM 9.1 12/15/2020    EGFR 94 12/15/2020    EGFR 109 12/15/2020    ANIONGAP 10  12/05/2016       Lab Results   Component Value Date    CHOL 135 06/16/2020    TRIG 151 (H) 06/16/2020    HDL 46 (L) 06/16/2020    VLDL 27 09/04/2015    LDLCALC 66 06/16/2020       Lab Results   Component Value Date    CREATUR 138.2 09/04/2015    MICROALBUR 1.2 12/15/2020              Assessment/Plan     Type 2 diabetes mellitus without complication (CMS/HCC) (HCC)  Diabetes mellitus type 2 at goal in 1/21 with A1c of 6.8%   Reviewed all diabetic blood testing results  Discussed home glucose and A1c goals  Rec ophtho, dental, podiatry, nutrition visits  Check testing 2 times per day  Cont metformin to 1g BID (from 1500mg daily)  Increase Trulicity to  3 mg weekly for improved glycemic control and additional weight loss benefits.   Acarbose 50 mg pre meal TIDAC (encouraged compliance).   Discussed med side effects and patient accepts risks    Dyslipidemia  LDL at goal in 2020 cont simvastatin 20 mg daily.   Yearly FLP check  Will need GI clearance before any statin dose titration in the future  Discussed med side effects    Essential hypertension  HTN at goal cont Quinapril 10 mg daily  Check yearly urine mc    Hypovitaminosis D  Advised to increase Vit D to 2000 IU daily.   Check D levels 1-2x yearly.     Abnormal LFTs  -  Suspect possibility of fatty  Liver disease given obesity, DM2 and IR but defer formal evaluation to PCP and GI team. Advised patient to f/u with GI as scheduled     Patient expressed agreement and understanding with current plan. Patient aware to follow up as a scheduled or sooner as needed. Patient aware to call clinic in event of any clinical concerns.     I have spent 42 mins on this encounte, greater than 50% of which was spent on counseling and coordinating care with the patient          There are no diagnoses linked to this encounter.

## 2021-01-12 NOTE — PATIENT INSTRUCTIONS
Thank you for scheduling an appointment with me today. I hope that we have answered all of your questions and considered your problems.  If you have any issues before your next appointment, please let me know. You may call [phone] for any questions or concerns.     Sincerely,     Forrest Engle MD       Plan:    Increase trulicity dose to 3 mg weekly.   Please follow up with GI doctor as scheduled.

## 2021-02-13 DIAGNOSIS — E11.9 TYPE 2 DIABETES MELLITUS WITHOUT COMPLICATION, WITH LONG-TERM CURRENT USE OF INSULIN (CMS/HCC): ICD-10-CM

## 2021-02-13 DIAGNOSIS — Z79.4 TYPE 2 DIABETES MELLITUS WITHOUT COMPLICATION, WITH LONG-TERM CURRENT USE OF INSULIN (CMS/HCC): ICD-10-CM

## 2021-02-15 RX ORDER — SIMVASTATIN 20 MG/1
TABLET, FILM COATED ORAL
Qty: 90 TABLET | Refills: 3 | Status: SHIPPED | OUTPATIENT
Start: 2021-02-15 | End: 2022-02-09

## 2021-02-18 ENCOUNTER — TELEPHONE (OUTPATIENT)
Dept: OBSTETRICS AND GYNECOLOGY | Facility: CLINIC | Age: 66
End: 2021-02-18

## 2021-02-18 NOTE — TELEPHONE ENCOUNTER
Pt is asking for her daughter, can you recommend a reproductive doctor that deals w/ immunologist.

## 2021-03-23 NOTE — TELEPHONE ENCOUNTER
Medicine Refill Request    Last Office Visit: 10/6/2020  Last Telemedicine Visit: Visit date not found    Next Office Visit: Visit date not found  Next Telemedicine Visit: Visit date not found         Current Outpatient Medications:   •  acarbose (PRECOSE) 50 mg tablet, TAKE 1 TABLET BY ORAL ROUTE 3 TIMES EVERY DAY AT THE START (WITH THE FIRST BITE) OF EACH MAIN MEAL, Disp: 270 tablet, Rfl: 3  •  acarbose (PRECOSE) 50 mg tablet, Take 1 tablet (50 mg total) by mouth 3 (three) times a day with meals., Disp: 270 tablet, Rfl: 3  •  aspirin (ASPIR-81) 81 mg enteric coated tablet, take 1 tablet by oral route  every day, Disp: , Rfl:   •  azelastine (ASTELIN) 137 mcg (0.1 %) nasal spray, Administer 1 spray into each nostril daily. Use in each nostril as directed., Disp: , Rfl:   •  b complex vitamins tablet, No SIG Entered, Disp: , Rfl:   •  blood sugar diagnostic (CONTOUR NEXT TEST STRIPS) strip, USE TO CHECK BG 3 TIMES A DAY, Disp: 100 strip, Rfl: 3  •  blood-glucose meter (CONTOUR NEXT METER) misc, To test 4x daily, Disp: 1 each, Rfl: 0  •  cholecalciferol, vitamin D3, (VITAMIN D3) 1,000 unit capsule, One tablet daily, Disp: , Rfl:   •  dulaglutide 3 mg/0.5 mL pen injector, Inject 3 mg under the skin every (seven) 7 days., Disp: 6 mL, Rfl: 1  •  estradiol (IMVEXXY STARTER PACK) 10 mcg insert, dose pack, Insert 10 mcg into the vagina daily., Disp: 38 each, Rfl: 6  •  folic acid (FOLVITE) 1 mg tablet, Take 1 mg by mouth daily., Disp: , Rfl:   •  IMVEXXY MAINTENANCE PACK 10 mcg insert, Insert 10 mcg into the vagina 2 (two) times a week (Mon, Thu). (Patient not taking: Reported on 9/3/2020 ), Disp: 1 each, Rfl: 6  •  lancets (MICROLET LANCET) misc, Check blood glucose 4 times a day, Disp: 150 each, Rfl: 11  •  metFORMIN (GLUCOPHAGE) 500 mg tablet, TAKE 2 TABLETS BY MOUTH TWICE A DAY WITH MEALS (Patient taking differently: 500 mg. 3 tabs daily ), Disp: 360 tablet, Rfl: 3  •  omega-3 fatty acids-fish oil (omega-3) 300-1,000  mg capsule, take 1 capsule daily, Disp: , Rfl:   •  quinapril (ACCUPRIL) 10 mg tablet, Take 2 tablets (20 mg total) by mouth once daily., Disp: 180 tablet, Rfl: 3  •  resvera/chrom/gr.tea/EGCG/dig3 (RESVERATROL DIET ORAL), Take by mouth., Disp: , Rfl:   •  sertraline (ZOLOFT) 100 mg tablet, Take 100 mg by mouth every evening., Disp: , Rfl: 2  •  simvastatin (ZOCOR) 20 mg tablet, TAKE 1 TABLET BY MOUTH EVERY DAY AT NIGHT, Disp: 90 tablet, Rfl: 3  •  zinc sulfate (ZINCATE) 220 (50) mg capsule, Take 220 mg by mouth daily., Disp: , Rfl:       BP Readings from Last 3 Encounters:   01/12/21 122/74   10/06/20 (!) 168/88   09/03/20 116/68       Recent Lab results:  Lab Results   Component Value Date    CHOL 135 06/16/2020   ,   Lab Results   Component Value Date    HDL 46 (L) 06/16/2020   ,   Lab Results   Component Value Date    LDLCALC 66 06/16/2020   ,   Lab Results   Component Value Date    TRIG 151 (H) 06/16/2020        Lab Results   Component Value Date    GLUCOSE 137 (H) 12/15/2020   ,   Lab Results   Component Value Date    HGBA1C 6.8 (H) 12/15/2020         Lab Results   Component Value Date    CREATININE 0.66 12/15/2020       Lab Results   Component Value Date    TSH 3.03 12/15/2020

## 2021-03-24 RX ORDER — ESTRADIOL 10 UG/1
10 INSERT VAGINAL 2 TIMES WEEKLY
Qty: 34 EACH | Refills: 6 | Status: SHIPPED | OUTPATIENT
Start: 2021-03-25 | End: 2022-04-14 | Stop reason: SDUPTHER

## 2021-04-09 ENCOUNTER — TELEPHONE (OUTPATIENT)
Dept: ENDOCRINOLOGY | Facility: CLINIC | Age: 66
End: 2021-04-09

## 2021-04-16 LAB
ALBUMIN/CREAT UR: 11 MCG/MG CREAT
BUN SERPL-MCNC: 18 MG/DL (ref 7–25)
BUN/CREAT SERPL: ABNORMAL (CALC) (ref 6–22)
CALCIUM SERPL-MCNC: 9.3 MG/DL (ref 8.6–10.4)
CHLORIDE SERPL-SCNC: 104 MMOL/L (ref 98–110)
CHOLEST SERPL-MCNC: 134 MG/DL
CHOLEST/HDLC SERPL: 3.2 (CALC)
CO2 SERPL-SCNC: 26 MMOL/L (ref 20–32)
CREAT SERPL-MCNC: 0.68 MG/DL (ref 0.5–0.99)
CREAT UR-MCNC: 112 MG/DL (ref 20–275)
GLUCOSE SERPL-MCNC: 124 MG/DL (ref 65–99)
HBA1C MFR BLD: 6.5 % OF TOTAL HGB
HDLC SERPL-MCNC: 42 MG/DL
LDLC SERPL CALC-MCNC: 71 MG/DL (CALC)
MICROALBUMIN UR-MCNC: 1.2 MG/DL
NONHDLC SERPL-MCNC: 92 MG/DL (CALC)
POTASSIUM SERPL-SCNC: 4.5 MMOL/L (ref 3.5–5.3)
QUEST EGFR AFRICAN AMERICAN: 107 ML/MIN/1.73M2
QUEST EGFR NON-AFR. AMERICAN: 92 ML/MIN/1.73M2
SODIUM SERPL-SCNC: 140 MMOL/L (ref 135–146)
TRIGL SERPL-MCNC: 125 MG/DL

## 2021-04-22 ENCOUNTER — TELEPHONE (OUTPATIENT)
Dept: CARDIOLOGY | Facility: CLINIC | Age: 66
End: 2021-04-22

## 2021-04-22 ENCOUNTER — TELEPHONE (OUTPATIENT)
Dept: SCHEDULING | Facility: CLINIC | Age: 66
End: 2021-04-22

## 2021-04-22 DIAGNOSIS — I35.0 NONRHEUMATIC AORTIC VALVE STENOSIS: Primary | ICD-10-CM

## 2021-04-22 NOTE — TELEPHONE ENCOUNTER
Pt called to schedule OV w/ Dr. Cooley at [facility].     Appt sched 05/11 at 245PM.     Pt is inquiring if echo will also be needed at time of visit.     Pt can be reached at #[phone] to confirm.     Ty.

## 2021-04-22 NOTE — TELEPHONE ENCOUNTER
Patient with history of htn/DM/hyperlipid.  Last Echo 2/2020.  Normal-sized left ventricle. Mild concentric left ventricular hypertrophy.  Preserved systolic function. Estimated EF = 75%. Normal septal wall motion. No regional wall motion abnormalities. Grade I diastolic dysfunction.  Normal-sized RV. Normal RV systolic function.  Mildly dilated LA. Patent foramen ovale present with left to right shunting indicated by color doppler. Atrial septal aneurysm present. Mobile atrial septum present.  Normal-sized RA.  Aortic root sclerotic. The sinuses of Valsalva sclerotic. Ascending aorta sclerotic. Aortic arch grossly normal. Descending aorta difficult to visualize.  Tricuspid aortic valve. . Moderate diffuse aortic valve calcification with reduced excursion of the right coronary cusp, left coronary cusp and noncoronary cusp. Mild aortic valve regurgitation with a centrally directed jet. Mild aortic valve stenosis. The peak aortic velocity was measured in the right sternal border view.  Grossly normal leaflet structure of the mitral valve. Mild mitral valve regurgitation.  Tricuspid valve structure is grossly normal.  IVC is small caliber vessel (<1.7cm) and collapses >50% during inspiration.  There is a trivial pericardial effusion.      Please advise if you want repeat this year.  Thank you.

## 2021-04-22 NOTE — TELEPHONE ENCOUNTER
Please pre-cert Echo and assist with scheduling.  If possible please schedule same day as appointment.  Thank you.      AMIRAH NUGENT

## 2021-04-23 NOTE — TELEPHONE ENCOUNTER
NPR per plan  Ref# [id number]  Please call pt to give auth info and to assist with scheduling testing

## 2021-05-06 ENCOUNTER — OFFICE VISIT (OUTPATIENT)
Dept: ENDOCRINOLOGY | Facility: CLINIC | Age: 66
End: 2021-05-06
Payer: COMMERCIAL

## 2021-05-06 VITALS
BODY MASS INDEX: 40.02 KG/M2 | DIASTOLIC BLOOD PRESSURE: 70 MMHG | RESPIRATION RATE: 18 BRPM | SYSTOLIC BLOOD PRESSURE: 120 MMHG | WEIGHT: 212 LBS | HEART RATE: 78 BPM | HEIGHT: 61 IN | OXYGEN SATURATION: 98 %

## 2021-05-06 DIAGNOSIS — R79.89 ABNORMAL LFTS: ICD-10-CM

## 2021-05-06 DIAGNOSIS — I10 ESSENTIAL HYPERTENSION: ICD-10-CM

## 2021-05-06 DIAGNOSIS — E78.5 DYSLIPIDEMIA: Primary | ICD-10-CM

## 2021-05-06 DIAGNOSIS — E11.9 TYPE 2 DIABETES MELLITUS WITHOUT COMPLICATION, WITHOUT LONG-TERM CURRENT USE OF INSULIN (CMS/HCC): ICD-10-CM

## 2021-05-06 PROCEDURE — 3078F DIAST BP <80 MM HG: CPT | Performed by: INTERNAL MEDICINE

## 2021-05-06 PROCEDURE — 99215 OFFICE O/P EST HI 40 MIN: CPT | Performed by: INTERNAL MEDICINE

## 2021-05-06 PROCEDURE — 3008F BODY MASS INDEX DOCD: CPT | Performed by: INTERNAL MEDICINE

## 2021-05-06 PROCEDURE — 3074F SYST BP LT 130 MM HG: CPT | Performed by: INTERNAL MEDICINE

## 2021-05-06 RX ORDER — METFORMIN HYDROCHLORIDE 500 MG/1
1000 TABLET ORAL 2 TIMES DAILY WITH MEALS
Qty: 360 TABLET | Refills: 3 | Status: SHIPPED | OUTPATIENT
Start: 2021-05-06 | End: 2022-07-26 | Stop reason: SDUPTHER

## 2021-05-06 ASSESSMENT — ENCOUNTER SYMPTOMS
NERVOUS/ANXIOUS: 0
RESPIRATORY NEGATIVE: 1
EYES NEGATIVE: 1
ABDOMINAL DISTENTION: 0
UNEXPECTED WEIGHT CHANGE: 0
CARDIOVASCULAR NEGATIVE: 1

## 2021-05-06 NOTE — PROGRESS NOTES
Subjective      Patient ID: Citlali Gifford is a 65 y.o. female here for DM2, HTN, HLD f/u visit.        Case Hx   Initial diagnosis: 2014-15  Family history: Maternal side.   Previous endocrinologist: Dr. Samson  Started on treatment:  Since diagnosis.   No hx of thyroid cancer or pancreatitis. Reported.     May  2021 Update   Current regimen: Metformin 1g BID , Trulicity 1.5q weekly, Acarbose 50mg TID AC   Compliance: Misses acarbose often , takes only 1 -2 pills daily.   Proper pill / insulin technique:  NA  History of insulin use: NA   Failed / AE to meds in past: Jardiance causing yeast infections.   Check BG once daily, FBS 110s-140s, Pre meal 106-177  Hypoglycemia: NA  Hypoglycemia awareness / treatment: NA  Complications: None reported  Last eye exam:  10/2020  Foot exam: WNL in 8/2020  Taking ACEi and Statin.     Exercise: NA     Diet:  B: Protein+ Carb  L: Vegetables+ protein  D: Protein + vegetable + starch.   Seen DM educator / nutritionist in past: No  The following have been reviewed and updated as appropriate in this visit:    Has HLD takes Simvastatin 20mg daily.   Has HTN, takes quinalapril 10mg daily.     Started seeing Dr. Ocasio in GI for abnormal LFTs.            Review of Systems   Constitutional: Negative for unexpected weight change.   HENT: Negative.    Eyes: Negative.    Respiratory: Negative.    Cardiovascular: Negative.    Gastrointestinal: Negative for abdominal distention.   Psychiatric/Behavioral: The patient is not nervous/anxious.      Wt Readings from Last 10 Encounters:   05/06/21 96.2 kg (212 lb)   01/12/21 97.5 kg (215 lb)   10/06/20 97.7 kg (215 lb 6.4 oz)   09/03/20 95.3 kg (210 lb)   02/19/20 98 kg (216 lb 0.8 oz)   02/19/20 95.3 kg (210 lb)   08/13/19 98.2 kg (216 lb 6.4 oz)   06/26/19 97.3 kg (214 lb 6.4 oz)   04/29/19 97.5 kg (215 lb)   03/11/19 99.8 kg (220 lb)     Family History   Problem Relation Age of Onset   • Colon cancer Paternal Grandfather    • Diabetes  Biological Mother    • Heart attack Biological Father        Social History     Socioeconomic History   • Marital status:      Spouse name: Not on file   • Number of children: Not on file   • Years of education: Not on file   • Highest education level: Not on file   Occupational History   • Not on file   Tobacco Use   • Smoking status: Never Smoker   • Smokeless tobacco: Never Used   Substance and Sexual Activity   • Alcohol use: Yes     Comment: ocasional   • Drug use: No   • Sexual activity: Not on file     Comment:    Other Topics Concern   • Not on file   Social History Narrative   • Not on file     Social Determinants of Health     Financial Resource Strain:    • Difficulty of Paying Living Expenses:    Food Insecurity:    • Worried About Running Out of Food in the Last Year:    • Ran Out of Food in the Last Year:    Transportation Needs:    • Lack of Transportation (Medical):    • Lack of Transportation (Non-Medical):    Physical Activity:    • Days of Exercise per Week:    • Minutes of Exercise per Session:    Stress:    • Feeling of Stress :    Social Connections:    • Frequency of Communication with Friends and Family:    • Frequency of Social Gatherings with Friends and Family:    • Attends Yarsanism Services:    • Active Member of Clubs or Organizations:    • Attends Club or Organization Meetings:    • Marital Status:    Intimate Partner Violence:    • Fear of Current or Ex-Partner:    • Emotionally Abused:    • Physically Abused:    • Sexually Abused:          Current Outpatient Medications:   •  acarbose (PRECOSE) 50 mg tablet, TAKE 1 TABLET BY ORAL ROUTE 3 TIMES EVERY DAY AT THE START (WITH THE FIRST BITE) OF EACH MAIN MEAL, Disp: 270 tablet, Rfl: 3  •  aspirin (ASPIR-81) 81 mg enteric coated tablet, take 1 tablet by oral route  every day, Disp: , Rfl:   •  azelastine (ASTELIN) 137 mcg (0.1 %) nasal spray, Administer 1 spray into each nostril daily. Use in each nostril as directed., Disp:  ", Rfl:   •  b complex vitamins tablet, No SIG Entered, Disp: , Rfl:   •  blood sugar diagnostic (CONTOUR NEXT TEST STRIPS) strip, USE TO CHECK BG 3 TIMES A DAY, Disp: 100 strip, Rfl: 3  •  blood-glucose meter (CONTOUR NEXT METER) misc, To test 4x daily, Disp: 1 each, Rfl: 0  •  cholecalciferol, vitamin D3, (VITAMIN D3) 1,000 unit capsule, One tablet daily, Disp: , Rfl:   •  dulaglutide 3 mg/0.5 mL pen injector, Inject 3 mg under the skin every (seven) 7 days., Disp: 6 mL, Rfl: 1  •  estradiol (IMVEXXY STARTER PACK) 10 mcg insert, dose pack, Insert 10 mcg into the vagina daily., Disp: 38 each, Rfl: 6  •  folic acid (FOLVITE) 1 mg tablet, Take 1 mg by mouth daily., Disp: , Rfl:   •  IMVEXXY MAINTENANCE PACK 10 mcg insert, INSERT 10 MCG INTO THE VAGINA 2 (TWO) TIMES A WEEK (MON, THU)., Disp: 34 each, Rfl: 6  •  lancets (MICROLET LANCET) misc, Check blood glucose 4 times a day, Disp: 150 each, Rfl: 11  •  metFORMIN (GLUCOPHAGE) 500 mg tablet, TAKE 2 TABLETS BY MOUTH TWICE A DAY WITH MEALS (Patient taking differently: 500 mg. 3 tabs daily ), Disp: 360 tablet, Rfl: 3  •  omega-3 fatty acids-fish oil (omega-3) 300-1,000 mg capsule, take 1 capsule daily, Disp: , Rfl:   •  quinapril (ACCUPRIL) 10 mg tablet, Take 2 tablets (20 mg total) by mouth once daily., Disp: 180 tablet, Rfl: 3  •  resvera/chrom/gr.tea/EGCG/dig3 (RESVERATROL DIET ORAL), Take by mouth., Disp: , Rfl:   •  sertraline (ZOLOFT) 100 mg tablet, Take 100 mg by mouth every evening., Disp: , Rfl: 2  •  simvastatin (ZOCOR) 20 mg tablet, TAKE 1 TABLET BY MOUTH EVERY DAY AT NIGHT, Disp: 90 tablet, Rfl: 3  •  zinc sulfate (ZINCATE) 220 (50) mg capsule, Take 220 mg by mouth daily., Disp: , Rfl:   •  acarbose (PRECOSE) 50 mg tablet, Take 1 tablet (50 mg total) by mouth 3 (three) times a day with meals., Disp: 270 tablet, Rfl: 3      Objective     Vitals:    05/06/21 1028   BP: 120/70   Pulse: 78   Resp: 18   SpO2: 98%   Weight: 96.2 kg (212 lb)   Height: 1.549 m (5' 1\") "     Body mass index is 40.06 kg/m².     Physical Exam     Constitutional:       Appearance: Normal appearance. obese  HENT:      Head: Normocephalic.   Eyes:      Extraocular Movements: Extraocular movements intact.   Pulmonary:      Effort: Pulmonary effort is normal.   Musculoskeletal:         General: No swelling or tenderness.   Skin:     General: Skin is warm and dry.   Neurological:      General: No focal deficit present.   Psychiatric:         Behavior: Behavior normal.      DM foot exam with monofilament wnl, Inspection wnl       Hemoglobin A1C   Date Value Ref Range Status   04/15/2021 6.5 (H) <5.7 % of total Hgb Final     Comment:     For someone without known diabetes, a hemoglobin A1c  value of 6.5% or greater indicates that they may have   diabetes and this should be confirmed with a follow-up   test.     For someone with known diabetes, a value <7% indicates   that their diabetes is well controlled and a value   greater than or equal to 7% indicates suboptimal   control. A1c targets should be individualized based on   duration of diabetes, age, comorbid conditions, and   other considerations.     Currently, no consensus exists regarding use of  hemoglobin A1c for diagnosis of diabetes for children.         12/15/2020 6.8 (H) <5.7 % of total Hgb Final     Comment:     For someone without known diabetes, a hemoglobin A1c  value of 6.5% or greater indicates that they may have   diabetes and this should be confirmed with a follow-up   test.     For someone with known diabetes, a value <7% indicates   that their diabetes is well controlled and a value   greater than or equal to 7% indicates suboptimal   control. A1c targets should be individualized based on   duration of diabetes, age, comorbid conditions, and   other considerations.     Currently, no consensus exists regarding use of  hemoglobin A1c for diagnosis of diabetes for children.         06/16/2020 6.8 (H) <5.7 % of total Hgb Final     Comment:      For someone without known diabetes, a hemoglobin A1c  value of 6.5% or greater indicates that they may have   diabetes and this should be confirmed with a follow-up   test.     For someone with known diabetes, a value <7% indicates   that their diabetes is well controlled and a value   greater than or equal to 7% indicates suboptimal   control. A1c targets should be individualized based on   duration of diabetes, age, comorbid conditions, and   other considerations.     Currently, no consensus exists regarding use of  hemoglobin A1c for diagnosis of diabetes for children.           Glucose   Date Value Ref Range Status   04/15/2021 124 (H) 65 - 99 mg/dL Final     Comment:                   Fasting reference interval     For someone without known diabetes, a glucose value  between 100 and 125 mg/dL is consistent with  prediabetes and should be confirmed with a  follow-up test.        12/15/2020 137 (H) 65 - 99 mg/dL Final     Comment:                   Fasting reference interval     For someone without known diabetes, a glucose  value >125 mg/dL indicates that they may have  diabetes and this should be confirmed with a  follow-up test.        06/16/2020 158 (H) 65 - 99 mg/dL Final     Comment:                   Fasting reference interval     For someone without known diabetes, a glucose  value >125 mg/dL indicates that they may have  diabetes and this should be confirmed with a  follow-up test.          Sodium   Date Value Ref Range Status   04/15/2021 140 135 - 146 mmol/L Final   12/15/2020 139 135 - 146 mmol/L Final   06/16/2020 140 135 - 146 mmol/L Final     Potassium   Date Value Ref Range Status   04/15/2021 4.5 3.5 - 5.3 mmol/L Final   12/15/2020 4.3 3.5 - 5.3 mmol/L Final   06/16/2020 4.3 3.5 - 5.3 mmol/L Final     Carbon Dioxide   Date Value Ref Range Status   04/15/2021 26 20 - 32 mmol/L Final   12/15/2020 23 20 - 32 mmol/L Final   06/16/2020 24 20 - 32 mmol/L Final     Chloride   Date Value Ref Range  Status   04/15/2021 104 98 - 110 mmol/L Final   12/15/2020 108 98 - 110 mmol/L Final   06/16/2020 105 98 - 110 mmol/L Final     BUN   Date Value Ref Range Status   04/15/2021 18 7 - 25 mg/dL Final   12/15/2020 20 7 - 25 mg/dL Final   06/16/2020 19 7 - 25 mg/dL Final     Egfr Non-Afr. American   Date Value Ref Range Status   04/15/2021 92 > OR = 60 mL/min/1.73m2 Final   12/15/2020 94 > OR = 60 mL/min/1.73m2 Final     Egfr    Date Value Ref Range Status   04/15/2021 107 > OR = 60 mL/min/1.73m2 Final   12/15/2020 109 > OR = 60 mL/min/1.73m2 Final     Creatinine   Date Value Ref Range Status   04/15/2021 0.68 0.50 - 0.99 mg/dL Final     Comment:     For patients >49 years of age, the reference limit  for Creatinine is approximately 13% higher for people  identified as -American.        12/15/2020 0.66 0.50 - 0.99 mg/dL Final     Comment:     For patients >49 years of age, the reference limit  for Creatinine is approximately 13% higher for people  identified as -American.        06/16/2020 0.75 0.50 - 0.99 mg/dL Final     Comment:     For patients >49 years of age, the reference limit  for Creatinine is approximately 13% higher for people  identified as -American.          Calcium, Serum   Date Value Ref Range Status   09/04/2015 10.1 8.7 - 10.2 mg/dL      Alt   Date Value Ref Range Status   12/15/2020 89 (H) 6 - 29 U/L Final   06/16/2020 89 (H) 6 - 29 U/L Final   03/10/2020 79 (H) 6 - 29 U/L Final     Cholesterol, Total   Date Value Ref Range Status   04/15/2021 134 <200 mg/dL Final   06/16/2020 135 <200 mg/dL Final   03/10/2020 140 <200 mg/dL Final     Hdl Cholesterol   Date Value Ref Range Status   04/15/2021 42 (L) > OR = 50 mg/dL Final   06/16/2020 46 (L) > OR = 50 mg/dL Final   03/10/2020 48 (L) > OR = 50 mg/dL Final     Triglycerides   Date Value Ref Range Status   04/15/2021 125 <150 mg/dL Final   06/16/2020 151 (H) <150 mg/dL Final   03/10/2020 112 <150 mg/dL Final     TSH    Date Value Ref Range Status   12/15/2020 3.03 0.40 - 4.50 mIU/L Final   11/08/2016 2.22 0.40 - 4.50 mIU/L      Comment:     Test performed at [facility] [address]  [id number] Director: SAMREEN COREY MD PHD   10/26/2015 2.10 0.34 - 5.6 mIU/L Final       Lab Results   Component Value Date    TSH 3.03 12/15/2020    FREET4 0.86 10/26/2015       Lab Results   Component Value Date    HGBA1C 6.5 (H) 04/15/2021       Lab Results   Component Value Date    GLUCOSE 124 (H) 04/15/2021    BUN 18 04/15/2021    CREATININE 0.68 04/15/2021    EGFR 92 04/15/2021    EGFR 107 04/15/2021    BUNCREAT 23 09/04/2015     04/15/2021    K 4.5 04/15/2021     04/15/2021    CO2 26 04/15/2021    CA 10.1 09/04/2015    PROTEIN 7.0 12/15/2020    ALBUMIN 4.2 12/15/2020    GLOB 2.8 12/15/2020    AGRATIO 1.5 12/15/2020    BILITOT 0.4 12/15/2020    ALKPHOS 55 12/15/2020    AST 56 (H) 12/15/2020    ALT 89 (H) 12/15/2020       Lab Results   Component Value Date     04/15/2021    K 4.5 04/15/2021     04/15/2021    CO2 26 04/15/2021    BUN 18 04/15/2021    CREATININE 0.68 04/15/2021    GLUCOSE 124 (H) 04/15/2021    CALCIUM 9.3 04/15/2021    EGFR 92 04/15/2021    EGFR 107 04/15/2021    ANIONGAP 10 12/05/2016       Lab Results   Component Value Date    CHOL 134 04/15/2021    TRIG 125 04/15/2021    HDL 42 (L) 04/15/2021    VLDL 27 09/04/2015    LDLCALC 71 04/15/2021       Lab Results   Component Value Date    CREATUR 138.2 09/04/2015    MICROALBUR 1.2 04/15/2021              Assessment/Plan     Type 2 diabetes mellitus without complication (CMS/HCC) (HCC)  Diabetes mellitus type 2 at goal in 4/21 with A1c of 6.5%   Reviewed all diabetic blood testing results  Discussed home glucose and A1c goals  Rec ophtho, dental, podiatry, nutrition visits  Check testing 1 time per day  Cont metformin to 1g BID  Cont Trulicity 3 mg weekly   Acarbose 50 mg pre meal TIDAC (encouraged compliance).    Discussed med side effects and patient accepts risks    Dyslipidemia  LDL at goal in 4/2021 cont simvastatin 20 mg daily.   Yearly FLP check  Will need GI clearance before any statin dose titration in the future  Discussed med side effects    Essential hypertension  HTN at goal cont Quinapril 10 mg daily  Check yearly urine mc    Hypovitaminosis D  Cont Vit D 2000 IU daily.   Check D levels yearly.     Abnormal LFTs  -  Suspect possibility of fatty  Liver disease given obesity, DM2 and IR but defer formal evaluation to PCP and GI team. Advised patient to f/u with GI. Dr Ocasio as scheduled     Patient expressed agreement and understanding with current plan. Patient aware to follow up as a scheduled or sooner as needed. Patient aware to call clinic in event of any clinical concerns.     I spent 40 minutes on this date of service performing the following activities: obtaining history, performing examination, entering orders, documenting, preparing for visit, obtaining / reviewing records, providing counseling and education and communicating results      RTC in 4 months

## 2021-05-10 ENCOUNTER — TELEPHONE (OUTPATIENT)
Dept: SCHEDULING | Facility: CLINIC | Age: 66
End: 2021-05-10

## 2021-05-10 NOTE — TELEPHONE ENCOUNTER
Patient Cx'd tomorrow's 5/11 appointment with Dr. Cooley.    Reason: The echo lab called needing to reschedule patients appointment and the patient stated that she is wanting to come in and having the office visit and test on the same day.  Patient was okay with June 18 reschedule date.    Pt can be reached at: [phone]

## 2021-06-08 DIAGNOSIS — Z79.4 TYPE 2 DIABETES MELLITUS WITHOUT COMPLICATION, WITH LONG-TERM CURRENT USE OF INSULIN (CMS/HCC): ICD-10-CM

## 2021-06-08 DIAGNOSIS — E11.9 TYPE 2 DIABETES MELLITUS WITHOUT COMPLICATION, WITH LONG-TERM CURRENT USE OF INSULIN (CMS/HCC): ICD-10-CM

## 2021-06-09 RX ORDER — DULAGLUTIDE 3 MG/.5ML
INJECTION, SOLUTION SUBCUTANEOUS
Qty: 3 ML | Refills: 3 | Status: SHIPPED | OUTPATIENT
Start: 2021-06-09 | End: 2021-09-16

## 2021-06-09 NOTE — TELEPHONE ENCOUNTER
Last Office Visit: 5/6/2021  Last Telemedicine Visit: 4/9/2020 Ana Vazquez MD    Next Office Visit: 9/9/2021  Next Telemedicine Visit: Visit date not found   [facility] 04/15/2021    Trulicity

## 2021-06-22 ENCOUNTER — OFFICE VISIT (OUTPATIENT)
Dept: CARDIOLOGY | Facility: CLINIC | Age: 66
End: 2021-06-22
Payer: COMMERCIAL

## 2021-06-22 ENCOUNTER — HOSPITAL ENCOUNTER (OUTPATIENT)
Dept: CARDIOLOGY | Facility: HOSPITAL | Age: 66
Discharge: HOME | End: 2021-06-22
Attending: INTERNAL MEDICINE
Payer: COMMERCIAL

## 2021-06-22 VITALS
BODY MASS INDEX: 39.08 KG/M2 | TEMPERATURE: 98.6 F | RESPIRATION RATE: 16 BRPM | OXYGEN SATURATION: 98 % | WEIGHT: 207 LBS | SYSTOLIC BLOOD PRESSURE: 110 MMHG | DIASTOLIC BLOOD PRESSURE: 74 MMHG | HEIGHT: 61 IN | HEART RATE: 78 BPM

## 2021-06-22 VITALS
DIASTOLIC BLOOD PRESSURE: 73 MMHG | WEIGHT: 212 LBS | HEART RATE: 61 BPM | HEIGHT: 61 IN | BODY MASS INDEX: 40.02 KG/M2 | SYSTOLIC BLOOD PRESSURE: 163 MMHG

## 2021-06-22 DIAGNOSIS — I10 ESSENTIAL HYPERTENSION: Primary | ICD-10-CM

## 2021-06-22 DIAGNOSIS — I35.0 NONRHEUMATIC AORTIC VALVE STENOSIS: ICD-10-CM

## 2021-06-22 LAB
AORTIC ROOT ANNULUS - M-MODE: 2.4 CM
AORTIC VALVE AREA: 0.72 SQUARE CENTIMETERS PER SQUARE METER
AORTIC VALVE AT: 91.17 MS
AORTIC VALVE MEAN VELOCITY: 2.4 M/S
AORTIC VALVE VELOCITY TIME INTEGRAL: 63.02 CM
AV MEAN GRADIENT: 26 MMHG
AV PEAK GRADIENT: 49 MMHG
AV PEAK VELOCITY-S: 3.5 M/S
BSA FOR ECHO PROCEDURE: 1.94 M2
CUSP SEPARATION: 1.85 CM
DOP CALC LVOT STROKE VOLUME: 116.45 ML
DOP CALC RVOT VTI: 17.22 CM
E WAVE DECELERATION TIME: 335.65 MS
E WAVE DECELERATION TIME: 335.65 MS
E/A RATIO: 0.68
E/E' RATIO: 8.42
E/LAT E' RATIO: 6.2
EDV (BP): 91.03 ML
EF (A4C): 85.86 %
EF A2C: 83.28 %
EJECTION FRACTION: 84.37 %
ESV (BP): 14.23 ML
INTERVENTRICULAR SEPTUM: 1.31 CM
LA ESV (BP): 64.02 ML
LA ESV INDEX (A2C): 31.39 ML/M2
LA ESV INDEX (BP): 33 ML/M2
LA/AORTA RATIO: 1.71
LAAS-AP2: 20.93 CM2
LAAS-AP4: 19.07 CM2
LAD 2D - M-MODE: 4.1 CM
LAV-S: 60.9 ML
LEFT ATRIUM VOLUME INDEX: 29.74 ML/M2
LEFT ATRIUM VOLUME: 57.7 ML
LEFT INTERNAL DIMENSION IN SYSTOLE: 1.59 CM (ref 2.71–4.11)
LEFT VENTRICLE DIASTOLIC VOLUME INDEX: 46.29 ML/M2
LEFT VENTRICLE DIASTOLIC VOLUME: 89.8 ML
LEFT VENTRICLE MASS INDEX: 83.91 G/M2
LEFT VENTRICLE SYSTOLIC VOLUME INDEX: 6.55 ML/M2
LEFT VENTRICLE SYSTOLIC VOLUME: 12.7 ML
LEFT VENTRICULAR INTERNAL DIMENSION IN DIASTOLE: 3.74 CM (ref 4.6–6.39)
LEFT VENTRICULAR MASS: 162.78 G
LEFT VENTRICULAR POSTERIOR WALL IN END DIASTOLE: 1.23 CM (ref 0.6–1.12)
LV DIASTOLIC VOLUME: 91.5 ML
LV ESV (APICAL 2 CHAMBER): 15.3 ML
LVCI: 2.52 LITERS PER MINUTE PER SQUARE METER
LVCO: 4.88 LITERS PER MINUTE
LVEDVI(A2C): 47.16 ML/M2
LVEDVI(BP): 46.92 ML/M2
LVESVI(A2C): 7.89 ML/M2
LVESVI(BP): 7.34 ML/M2
LVOT 2D: 2.1 CM
LVOT A: 2.89 CM2
LVOT MG: 5 MMHG
LVOT MV: 1.06 M/S
LVOT PEAK VELOCITY: 1.43 M/S
LVOT VTI: 33.64 CM
MV E'TISSUE VEL-LAT: 0.07 M/S
MV E'TISSUE VEL-MED: 0.05 M/S
MV PEAK A VEL: 0.67 M/S
MV PEAK E VEL: 0.46 M/S
MV VALVE AREA P 1/2 METHOD: 2.26 CM2
PULM VEIN S/D RATIO: 1.31
PV PEAK D VEL: 0.44 M/S
PV PEAK S VEL: 0.58 M/S
Z-SCORE OF LEFT VENTRICULAR DIMENSION IN END DIASTOLE: -3.71
Z-SCORE OF LEFT VENTRICULAR DIMENSION IN END SYSTOLE: -5.86
Z-SCORE OF LEFT VENTRICULAR POSTERIOR WALL IN END DIASTOLE: 2.12

## 2021-06-22 PROCEDURE — 3074F SYST BP LT 130 MM HG: CPT | Performed by: INTERNAL MEDICINE

## 2021-06-22 PROCEDURE — 99214 OFFICE O/P EST MOD 30 MIN: CPT | Mod: 25 | Performed by: INTERNAL MEDICINE

## 2021-06-22 PROCEDURE — 93000 ELECTROCARDIOGRAM COMPLETE: CPT | Performed by: INTERNAL MEDICINE

## 2021-06-22 PROCEDURE — 3078F DIAST BP <80 MM HG: CPT | Performed by: INTERNAL MEDICINE

## 2021-06-22 PROCEDURE — 3008F BODY MASS INDEX DOCD: CPT | Performed by: INTERNAL MEDICINE

## 2021-06-22 PROCEDURE — 93306 TTE W/DOPPLER COMPLETE: CPT

## 2021-06-22 PROCEDURE — 93306 TTE W/DOPPLER COMPLETE: CPT | Mod: 26 | Performed by: INTERNAL MEDICINE

## 2021-06-22 RX ORDER — OMEPRAZOLE 20 MG/1
20 CAPSULE, DELAYED RELEASE ORAL
COMMUNITY

## 2021-06-22 ASSESSMENT — ENCOUNTER SYMPTOMS
DYSPNEA ON EXERTION: 1
MUSCULOSKELETAL NEGATIVE: 1
PSYCHIATRIC NEGATIVE: 1
DIZZINESS: 1
HEMATURIA: 1
WEIGHT LOSS: 1
EYES NEGATIVE: 1
RESPIRATORY NEGATIVE: 1
GASTROINTESTINAL NEGATIVE: 1
HEMATOLOGIC/LYMPHATIC NEGATIVE: 1

## 2021-06-22 NOTE — LETTER
June 22, 2021     Billy Antunez DO  [address] [address]    Patient: Citlali Gifford  YOB: 1955  Date of Visit: 6/22/2021      Dear Dr. Antunez:    Thank you for referring Citlali Gifford to me for evaluation. Below are my notes for this consultation.    If you have questions, please do not hesitate to call me. I look forward to following your patient along with you.         Sincerely,        Lupillo Cooley DO        CC: No Recipients  Lupillo Cooley DO  6/22/2021 11:41 AM  Signed      Chief Complaint: I saw Mrs. Gifford in the office today on a routine visit for her mild aortic stenosis and aortic regurgitation.  She denies chest discomfort but has some shortness of breath with exertion not anxiety, cold weather, postprandially, with sex, at rest, or nocturnally.  She can climb a flight of stairs without getting short of breath, she denies proximal nocturnal dyspnea orthopnea palpitations syncope ankle edema she has nocturia       Medications:  Current Outpatient Medications   Medication Sig Dispense Refill   • acarbose (PRECOSE) 50 mg tablet TAKE 1 TABLET BY ORAL ROUTE 3 TIMES EVERY DAY AT THE START (WITH THE FIRST BITE) OF EACH MAIN MEAL 270 tablet 3   • aspirin (ASPIR-81) 81 mg enteric coated tablet take 1 tablet by oral route  every day     • azelastine (ASTELIN) 137 mcg (0.1 %) nasal spray Administer 1 spray into each nostril daily. Use in each nostril as directed.     • b complex vitamins tablet No SIG Entered     • blood sugar diagnostic (CONTOUR NEXT TEST STRIPS) strip USE TO CHECK BG 3 TIMES A  strip 3   • blood-glucose meter (CONTOUR NEXT METER) misc To test 4x daily 1 each 0   • cholecalciferol, vitamin D3, (VITAMIN D3) 1,000 unit capsule One tablet daily     • estradiol (IMVEXXY STARTER PACK) 10 mcg insert, dose pack Insert 10 mcg into the vagina daily. 38 each 6   • folic acid (FOLVITE) 1 mg tablet Take 1 mg by mouth daily.     • IMVEXXY MAINTENANCE  PACK 10 mcg insert INSERT 10 MCG INTO THE VAGINA 2 (TWO) TIMES A WEEK (MON, THU). 34 each 6   • lancets (MICROLET LANCET) misc Check blood glucose 4 times a day 150 each 11   • metFORMIN (GLUCOPHAGE) 500 mg tablet Take 2 tablets (1,000 mg total) by mouth 2 (two) times a day with meals. 360 tablet 3   • omega-3 fatty acids-fish oil (omega-3) 300-1,000 mg capsule take 1 capsule daily     • omeprazole (PriLOSEC) 20 mg capsule Take 20 mg by mouth daily before breakfast.     • quinapril (ACCUPRIL) 10 mg tablet Take 2 tablets (20 mg total) by mouth once daily. (Patient taking differently: Take by mouth daily.  ) 180 tablet 3   • resvera/chrom/gr.tea/EGCG/dig3 (RESVERATROL DIET ORAL) Take by mouth.     • sertraline (ZOLOFT) 100 mg tablet Take 100 mg by mouth every evening.  2   • simvastatin (ZOCOR) 20 mg tablet TAKE 1 TABLET BY MOUTH EVERY DAY AT NIGHT 90 tablet 3   • TRULICITY 3 mg/0.5 mL pen injector INJECT THE CONTENTS OF 1 SYRINGE SUBCUTANEOUSLY EVERY 7 DAYS 3 mL 3   • zinc sulfate (ZINCATE) 220 (50) mg capsule Take 220 mg by mouth daily.     • acarbose (PRECOSE) 50 mg tablet Take 1 tablet (50 mg total) by mouth 3 (three) times a day with meals. (Patient not taking: Reported on 6/22/2021 ) 270 tablet 3     No current facility-administered medications for this visit.       Review of Systems:  Review of Systems   Constitutional: Positive for weight loss.   HENT: Negative.    Eyes: Negative.    Cardiovascular: Positive for dyspnea on exertion.   Respiratory: Negative.    Endocrine: Positive for heat intolerance.   Hematologic/Lymphatic: Negative.    Skin: Negative.    Musculoskeletal: Negative.    Gastrointestinal: Negative.    Genitourinary: Positive for hematuria and nocturia.   Neurological: Positive for dizziness.   Psychiatric/Behavioral: Negative.    Allergic/Immunologic: Positive for environmental allergies.       Physical Exam:  Vitals:    06/22/21 1056   BP: 110/74   Pulse: 78   Resp: 16   Temp: 37 °C (98.6 °F)    SpO2: 98%     Physical Exam  Constitutional:       Appearance: She is well-developed. She is obese.   HENT:      Head: Normocephalic and atraumatic.   Eyes:      Conjunctiva/sclera: Conjunctivae normal.      Pupils: Pupils are equal, round, and reactive to light.   Cardiovascular:      Rate and Rhythm: Normal rate and regular rhythm.      Pulses: Intact distal pulses.      Heart sounds: Murmur heard.   Gallop present.    Pulmonary:      Effort: Pulmonary effort is normal.      Breath sounds: Normal breath sounds.   Abdominal:      General: Bowel sounds are normal.      Palpations: Abdomen is soft.   Musculoskeletal:         General: Normal range of motion.      Cervical back: Normal range of motion and neck supple.   Skin:     General: Skin is warm and dry.   Neurological:      Mental Status: She is alert and oriented to person, place, and time.      Deep Tendon Reflexes: Reflexes are normal and symmetric.   Psychiatric:         Speech: Speech normal.         Behavior: Behavior normal.         Thought Content: Thought content normal.         Judgment: Judgment normal.       EKG: SR T wave abn    Assessment and Plan:  Impression:  Aortic stenosis with aortic regurgitation.  Hypertension controlled.  Microscopic hematuria.  Diabetes mellitus type 2.  Hyperlipidemia.  Abnormal liver function studies.  Recommendation:  Her blood pressure is well controlled we counseled her at length about diet and exercise.  We gave her some advice about her vertigo since I have personally had some.  We will call her this afternoon with the results of the echocardiogram and for the results to you.  We will see her in 6 months time, if there is a problem we will see her sooner.    Lupillo Cooley, DO

## 2021-06-22 NOTE — PROGRESS NOTES
Chief Complaint: I saw Mrs. Gifford in the office today on a routine visit for her mild aortic stenosis and aortic regurgitation.  She denies chest discomfort but has some shortness of breath with exertion not anxiety, cold weather, postprandially, with sex, at rest, or nocturnally.  She can climb a flight of stairs without getting short of breath, she denies proximal nocturnal dyspnea orthopnea palpitations syncope ankle edema she has nocturia       Medications:  Current Outpatient Medications   Medication Sig Dispense Refill   • acarbose (PRECOSE) 50 mg tablet TAKE 1 TABLET BY ORAL ROUTE 3 TIMES EVERY DAY AT THE START (WITH THE FIRST BITE) OF EACH MAIN MEAL 270 tablet 3   • aspirin (ASPIR-81) 81 mg enteric coated tablet take 1 tablet by oral route  every day     • azelastine (ASTELIN) 137 mcg (0.1 %) nasal spray Administer 1 spray into each nostril daily. Use in each nostril as directed.     • b complex vitamins tablet No SIG Entered     • blood sugar diagnostic (CONTOUR NEXT TEST STRIPS) strip USE TO CHECK BG 3 TIMES A  strip 3   • blood-glucose meter (CONTOUR NEXT METER) misc To test 4x daily 1 each 0   • cholecalciferol, vitamin D3, (VITAMIN D3) 1,000 unit capsule One tablet daily     • estradiol (IMVEXXY STARTER PACK) 10 mcg insert, dose pack Insert 10 mcg into the vagina daily. 38 each 6   • folic acid (FOLVITE) 1 mg tablet Take 1 mg by mouth daily.     • IMVEXXY MAINTENANCE PACK 10 mcg insert INSERT 10 MCG INTO THE VAGINA 2 (TWO) TIMES A WEEK (MON, THU). 34 each 6   • lancets (MICROLET LANCET) misc Check blood glucose 4 times a day 150 each 11   • metFORMIN (GLUCOPHAGE) 500 mg tablet Take 2 tablets (1,000 mg total) by mouth 2 (two) times a day with meals. 360 tablet 3   • omega-3 fatty acids-fish oil (omega-3) 300-1,000 mg capsule take 1 capsule daily     • omeprazole (PriLOSEC) 20 mg capsule Take 20 mg by mouth daily before breakfast.     • quinapril (ACCUPRIL) 10 mg tablet Take 2 tablets (20 mg  total) by mouth once daily. (Patient taking differently: Take by mouth daily.  ) 180 tablet 3   • resvera/chrom/gr.tea/EGCG/dig3 (RESVERATROL DIET ORAL) Take by mouth.     • sertraline (ZOLOFT) 100 mg tablet Take 100 mg by mouth every evening.  2   • simvastatin (ZOCOR) 20 mg tablet TAKE 1 TABLET BY MOUTH EVERY DAY AT NIGHT 90 tablet 3   • TRULICITY 3 mg/0.5 mL pen injector INJECT THE CONTENTS OF 1 SYRINGE SUBCUTANEOUSLY EVERY 7 DAYS 3 mL 3   • zinc sulfate (ZINCATE) 220 (50) mg capsule Take 220 mg by mouth daily.     • acarbose (PRECOSE) 50 mg tablet Take 1 tablet (50 mg total) by mouth 3 (three) times a day with meals. (Patient not taking: Reported on 6/22/2021 ) 270 tablet 3     No current facility-administered medications for this visit.       Review of Systems:  Review of Systems   Constitutional: Positive for weight loss.   HENT: Negative.    Eyes: Negative.    Cardiovascular: Positive for dyspnea on exertion.   Respiratory: Negative.    Endocrine: Positive for heat intolerance.   Hematologic/Lymphatic: Negative.    Skin: Negative.    Musculoskeletal: Negative.    Gastrointestinal: Negative.    Genitourinary: Positive for hematuria and nocturia.   Neurological: Positive for dizziness.   Psychiatric/Behavioral: Negative.    Allergic/Immunologic: Positive for environmental allergies.       Physical Exam:  Vitals:    06/22/21 1056   BP: 110/74   Pulse: 78   Resp: 16   Temp: 37 °C (98.6 °F)   SpO2: 98%     Physical Exam  Constitutional:       Appearance: She is well-developed. She is obese.   HENT:      Head: Normocephalic and atraumatic.   Eyes:      Conjunctiva/sclera: Conjunctivae normal.      Pupils: Pupils are equal, round, and reactive to light.   Cardiovascular:      Rate and Rhythm: Normal rate and regular rhythm.      Pulses: Intact distal pulses.      Heart sounds: Murmur heard.   Gallop present.    Pulmonary:      Effort: Pulmonary effort is normal.      Breath sounds: Normal breath sounds.   Abdominal:       General: Bowel sounds are normal.      Palpations: Abdomen is soft.   Musculoskeletal:         General: Normal range of motion.      Cervical back: Normal range of motion and neck supple.   Skin:     General: Skin is warm and dry.   Neurological:      Mental Status: She is alert and oriented to person, place, and time.      Deep Tendon Reflexes: Reflexes are normal and symmetric.   Psychiatric:         Speech: Speech normal.         Behavior: Behavior normal.         Thought Content: Thought content normal.         Judgment: Judgment normal.       EKG: SR T wave abn    Assessment and Plan:  Impression:  Aortic stenosis with aortic regurgitation.  Hypertension controlled.  Microscopic hematuria.  Diabetes mellitus type 2.  Hyperlipidemia.  Abnormal liver function studies.  Recommendation:  Her blood pressure is well controlled we counseled her at length about diet and exercise.  We gave her some advice about her vertigo since I have personally had some.  We will call her this afternoon with the results of the echocardiogram and for the results to you.  We will see her in 6 months time, if there is a problem we will see her sooner.    Lupillo Cooley, DO

## 2021-07-14 ENCOUNTER — TRANSCRIBE ORDERS (OUTPATIENT)
Dept: SCHEDULING | Age: 66
End: 2021-07-14

## 2021-07-14 DIAGNOSIS — R31.29 OTHER MICROSCOPIC HEMATURIA: Primary | ICD-10-CM

## 2021-07-19 ENCOUNTER — HOSPITAL ENCOUNTER (OUTPATIENT)
Dept: RADIOLOGY | Age: 66
Discharge: HOME | End: 2021-07-19
Attending: SPECIALIST
Payer: COMMERCIAL

## 2021-07-19 DIAGNOSIS — R31.29 OTHER MICROSCOPIC HEMATURIA: ICD-10-CM

## 2021-07-19 RX ORDER — GADOBUTROL 604.72 MG/ML
9 INJECTION INTRAVENOUS ONCE
Status: COMPLETED | OUTPATIENT
Start: 2021-07-19 | End: 2021-07-19

## 2021-07-19 RX ADMIN — GADOBUTROL 9 ML: 604.72 INJECTION INTRAVENOUS at 09:38

## 2021-09-09 LAB
ALBUMIN SERPL-MCNC: 4.3 G/DL (ref 3.6–5.1)
ALBUMIN/GLOB SERPL: 1.5 (CALC) (ref 1–2.5)
ALP SERPL-CCNC: 68 U/L (ref 37–153)
ALT SERPL-CCNC: 97 U/L (ref 6–29)
AST SERPL-CCNC: 60 U/L (ref 10–35)
BILIRUB SERPL-MCNC: 0.4 MG/DL (ref 0.2–1.2)
BUN SERPL-MCNC: 20 MG/DL (ref 7–25)
BUN/CREAT SERPL: ABNORMAL (CALC) (ref 6–22)
CALCIUM SERPL-MCNC: 9.4 MG/DL (ref 8.6–10.4)
CHLORIDE SERPL-SCNC: 103 MMOL/L (ref 98–110)
CO2 SERPL-SCNC: 28 MMOL/L (ref 20–32)
CREAT SERPL-MCNC: 0.74 MG/DL (ref 0.5–0.99)
GLOBULIN SER CALC-MCNC: 2.8 G/DL (CALC) (ref 1.9–3.7)
GLUCOSE SERPL-MCNC: 134 MG/DL (ref 65–99)
HBA1C MFR BLD: 6.4 % OF TOTAL HGB
POTASSIUM SERPL-SCNC: 4.6 MMOL/L (ref 3.5–5.3)
PROT SERPL-MCNC: 7.1 G/DL (ref 6.1–8.1)
QUEST EGFR AFRICAN AMERICAN: 99 ML/MIN/1.73M2
QUEST EGFR NON-AFR. AMERICAN: 86 ML/MIN/1.73M2
SODIUM SERPL-SCNC: 138 MMOL/L (ref 135–146)

## 2021-09-16 ENCOUNTER — OFFICE VISIT (OUTPATIENT)
Dept: ENDOCRINOLOGY | Facility: CLINIC | Age: 66
End: 2021-09-16
Payer: COMMERCIAL

## 2021-09-16 VITALS
TEMPERATURE: 98.8 F | WEIGHT: 208.2 LBS | BODY MASS INDEX: 39.31 KG/M2 | SYSTOLIC BLOOD PRESSURE: 124 MMHG | RESPIRATION RATE: 18 BRPM | HEART RATE: 69 BPM | OXYGEN SATURATION: 97 % | HEIGHT: 61 IN | DIASTOLIC BLOOD PRESSURE: 76 MMHG

## 2021-09-16 DIAGNOSIS — E78.5 DYSLIPIDEMIA: ICD-10-CM

## 2021-09-16 DIAGNOSIS — R79.89 ABNORMAL LFTS: ICD-10-CM

## 2021-09-16 DIAGNOSIS — E55.9 VITAMIN D DEFICIENCY: ICD-10-CM

## 2021-09-16 DIAGNOSIS — I10 ESSENTIAL HYPERTENSION: ICD-10-CM

## 2021-09-16 DIAGNOSIS — E11.9 TYPE 2 DIABETES MELLITUS WITHOUT COMPLICATION, WITHOUT LONG-TERM CURRENT USE OF INSULIN (CMS/HCC): Primary | ICD-10-CM

## 2021-09-16 LAB — GLUCOSE BLOOD, POC: 171

## 2021-09-16 PROCEDURE — 3074F SYST BP LT 130 MM HG: CPT | Performed by: INTERNAL MEDICINE

## 2021-09-16 PROCEDURE — 3008F BODY MASS INDEX DOCD: CPT | Performed by: INTERNAL MEDICINE

## 2021-09-16 PROCEDURE — 99214 OFFICE O/P EST MOD 30 MIN: CPT | Performed by: INTERNAL MEDICINE

## 2021-09-16 PROCEDURE — 3078F DIAST BP <80 MM HG: CPT | Performed by: INTERNAL MEDICINE

## 2021-09-16 PROCEDURE — 82962 GLUCOSE BLOOD TEST: CPT | Performed by: INTERNAL MEDICINE

## 2021-09-16 RX ORDER — QUINAPRIL 20 MG/1
20 TABLET ORAL DAILY
Qty: 180 TABLET | Refills: 3 | COMMUNITY
Start: 2021-09-16 | End: 2024-06-06 | Stop reason: ALTCHOICE

## 2021-09-16 RX ORDER — DULAGLUTIDE 4.5 MG/.5ML
4.5 INJECTION, SOLUTION SUBCUTANEOUS
Qty: 6 ML | Refills: 3 | Status: SHIPPED | OUTPATIENT
Start: 2021-09-16 | End: 2022-08-01

## 2021-09-16 ASSESSMENT — ENCOUNTER SYMPTOMS
RESPIRATORY NEGATIVE: 1
ABDOMINAL DISTENTION: 0
NERVOUS/ANXIOUS: 0
EYES NEGATIVE: 1
UNEXPECTED WEIGHT CHANGE: 0
CARDIOVASCULAR NEGATIVE: 1

## 2021-09-16 NOTE — PROGRESS NOTES
Subjective      Patient ID: Citlali Gifford is a 65 y.o. female here for DM2, HTN, HLD f/u visit.        Case Hx   Initial diagnosis: 2014-15  Family history: Maternal side.   Previous endocrinologist: Dr. Samson  Started on treatment:  Since diagnosis.   No hx of thyroid cancer or pancreatitis. Reported.     September 2021 Update   Current regimen: Metformin 1g BID , Trulicity 3mg q weekly, Acarbose 50mg TID AC   Compliance: Misses acarbose sometimes.   Proper pill / insulin technique:  NA  History of insulin use: NA   Failed / AE to meds in past: Jardiance causing yeast infections.   Check BG once daily, -170.   Hypoglycemia: NA  Hypoglycemia awareness / treatment: NA  Complications: None reported  Last eye exam:  10/2020  Foot exam: WNL in 8/2020  Taking ACEi and Statin.     Exercise: NA  Lost 4 lbs since last visit.      Diet:  B: Protein+ Carb  L: Vegetables+ protein  D: Protein + vegetable + starch.   Seen DM educator / nutritionist in past: No  The following have been reviewed and updated as appropriate in this visit:    Has HLD takes Simvastatin 20mg daily.   Has HTN, takes quinalapril 20mg daily.     Planned to see  Dr. Ocasio in GI for abnormal LFTs in 12/2021.            Review of Systems   Constitutional: Negative for unexpected weight change.   HENT: Negative.    Eyes: Negative.    Respiratory: Negative.    Cardiovascular: Negative.    Gastrointestinal: Negative for abdominal distention.   Psychiatric/Behavioral: The patient is not nervous/anxious.      Wt Readings from Last 10 Encounters:   09/16/21 94.4 kg (208 lb 3.2 oz)   07/19/21 93 kg (205 lb)   06/22/21 96.2 kg (212 lb)   06/22/21 93.9 kg (207 lb)   05/06/21 96.2 kg (212 lb)   01/12/21 97.5 kg (215 lb)   10/06/20 97.7 kg (215 lb 6.4 oz)   09/03/20 95.3 kg (210 lb)   02/19/20 98 kg (216 lb 0.8 oz)   02/19/20 95.3 kg (210 lb)     Family History   Problem Relation Age of Onset   • Colon cancer Paternal Grandfather    • Diabetes Biological  Mother    • Heart attack Biological Father        Social History     Socioeconomic History   • Marital status:      Spouse name: Not on file   • Number of children: Not on file   • Years of education: Not on file   • Highest education level: Not on file   Occupational History   • Not on file   Tobacco Use   • Smoking status: Never Smoker   • Smokeless tobacco: Never Used   Vaping Use   • Vaping Use: Never used   Substance and Sexual Activity   • Alcohol use: Yes     Comment: ocasional   • Drug use: No   • Sexual activity: Not on file     Comment:    Other Topics Concern   • Not on file   Social History Narrative   • Not on file     Social Determinants of Health     Financial Resource Strain:    • Difficulty of Paying Living Expenses:    Food Insecurity:    • Worried About Running Out of Food in the Last Year:    • Ran Out of Food in the Last Year:    Transportation Needs:    • Lack of Transportation (Medical):    • Lack of Transportation (Non-Medical):    Physical Activity:    • Days of Exercise per Week:    • Minutes of Exercise per Session:    Stress:    • Feeling of Stress :    Social Connections:    • Frequency of Communication with Friends and Family:    • Frequency of Social Gatherings with Friends and Family:    • Attends Buddhist Services:    • Active Member of Clubs or Organizations:    • Attends Club or Organization Meetings:    • Marital Status:    Intimate Partner Violence:    • Fear of Current or Ex-Partner:    • Emotionally Abused:    • Physically Abused:    • Sexually Abused:          Current Outpatient Medications:   •  acarbose (PRECOSE) 50 mg tablet, TAKE 1 TABLET BY ORAL ROUTE 3 TIMES EVERY DAY AT THE START (WITH THE FIRST BITE) OF EACH MAIN MEAL, Disp: 270 tablet, Rfl: 3  •  aspirin (ASPIR-81) 81 mg enteric coated tablet, take 1 tablet by oral route  every day, Disp: , Rfl:   •  b complex vitamins tablet, No SIG Entered, Disp: , Rfl:   •  blood sugar diagnostic (CONTOUR NEXT TEST  STRIPS) strip, USE TO CHECK BG 3 TIMES A DAY, Disp: 100 strip, Rfl: 3  •  blood-glucose meter (CONTOUR NEXT METER) misc, To test 4x daily, Disp: 1 each, Rfl: 0  •  cholecalciferol, vitamin D3, (VITAMIN D3) 1,000 unit capsule, One tablet daily, Disp: , Rfl:   •  estradiol (IMVEXXY STARTER PACK) 10 mcg insert, dose pack, Insert 10 mcg into the vagina daily., Disp: 38 each, Rfl: 6  •  folic acid (FOLVITE) 1 mg tablet, Take 1 mg by mouth daily., Disp: , Rfl:   •  IMVEXXY MAINTENANCE PACK 10 mcg insert, INSERT 10 MCG INTO THE VAGINA 2 (TWO) TIMES A WEEK (MON, THU)., Disp: 34 each, Rfl: 6  •  lancets (MICROLET LANCET) misc, Check blood glucose 4 times a day, Disp: 150 each, Rfl: 11  •  metFORMIN (GLUCOPHAGE) 500 mg tablet, Take 2 tablets (1,000 mg total) by mouth 2 (two) times a day with meals., Disp: 360 tablet, Rfl: 3  •  omega-3 fatty acids-fish oil (omega-3) 300-1,000 mg capsule, take 1 capsule daily, Disp: , Rfl:   •  omeprazole (PriLOSEC) 20 mg capsule, Take 20 mg by mouth daily before breakfast., Disp: , Rfl:   •  quinapril (ACCUPRIL) 10 mg tablet, Take 2 tablets (20 mg total) by mouth once daily. (Patient taking differently: Take by mouth daily.  ), Disp: 180 tablet, Rfl: 3  •  resvera/chrom/gr.tea/EGCG/dig3 (RESVERATROL DIET ORAL), Take by mouth., Disp: , Rfl:   •  sertraline (ZOLOFT) 100 mg tablet, Take 100 mg by mouth every evening., Disp: , Rfl: 2  •  simvastatin (ZOCOR) 20 mg tablet, TAKE 1 TABLET BY MOUTH EVERY DAY AT NIGHT, Disp: 90 tablet, Rfl: 3  •  TRULICITY 3 mg/0.5 mL pen injector, INJECT THE CONTENTS OF 1 SYRINGE SUBCUTANEOUSLY EVERY 7 DAYS, Disp: 3 mL, Rfl: 3  •  zinc sulfate (ZINCATE) 220 (50) mg capsule, Take 220 mg by mouth daily., Disp: , Rfl:   •  acarbose (PRECOSE) 50 mg tablet, Take 1 tablet (50 mg total) by mouth 3 (three) times a day with meals. (Patient not taking: Reported on 6/22/2021 ), Disp: 270 tablet, Rfl: 3  •  azelastine (ASTELIN) 137 mcg (0.1 %) nasal spray, Administer 1 spray into  "each nostril daily. Use in each nostril as directed., Disp: , Rfl:       Objective     Vitals:    09/16/21 0855   BP: 124/76   BP Location: Right upper arm   Patient Position: Sitting   Pulse: 69   Resp: 18   Temp: 37.1 °C (98.8 °F)   SpO2: 97%   Weight: 94.4 kg (208 lb 3.2 oz)   Height: 1.549 m (5' 1\")     Body mass index is 39.34 kg/m².     Physical Exam     Constitutional:       Appearance: Normal appearance. obese  HENT:      Head: Normocephalic.   Eyes:      Extraocular Movements: Extraocular movements intact.   Pulmonary:      Effort: Pulmonary effort is normal.   Musculoskeletal:         General: No swelling or tenderness.   Skin:     General: Skin is warm and dry.   Neurological:      General: No focal deficit present.   Psychiatric:         Behavior: Behavior normal.      DM foot exam with monofilament wnl, Inspection wnl       Hemoglobin A1C   Date Value Ref Range Status   09/09/2021 6.4 (H) <5.7 % of total Hgb Final     Comment:     For someone without known diabetes, a hemoglobin   A1c value between 5.7% and 6.4% is consistent with  prediabetes and should be confirmed with a   follow-up test.     For someone with known diabetes, a value <7%  indicates that their diabetes is well controlled. A1c  targets should be individualized based on duration of  diabetes, age, comorbid conditions, and other  considerations.     This assay result is consistent with an increased risk  of diabetes.     Currently, no consensus exists regarding use of  hemoglobin A1c for diagnosis of diabetes for children.        04/15/2021 6.5 (H) <5.7 % of total Hgb Final     Comment:     For someone without known diabetes, a hemoglobin A1c  value of 6.5% or greater indicates that they may have   diabetes and this should be confirmed with a follow-up   test.     For someone with known diabetes, a value <7% indicates   that their diabetes is well controlled and a value   greater than or equal to 7% indicates suboptimal   control. A1c " targets should be individualized based on   duration of diabetes, age, comorbid conditions, and   other considerations.     Currently, no consensus exists regarding use of  hemoglobin A1c for diagnosis of diabetes for children.         12/15/2020 6.8 (H) <5.7 % of total Hgb Final     Comment:     For someone without known diabetes, a hemoglobin A1c  value of 6.5% or greater indicates that they may have   diabetes and this should be confirmed with a follow-up   test.     For someone with known diabetes, a value <7% indicates   that their diabetes is well controlled and a value   greater than or equal to 7% indicates suboptimal   control. A1c targets should be individualized based on   duration of diabetes, age, comorbid conditions, and   other considerations.     Currently, no consensus exists regarding use of  hemoglobin A1c for diagnosis of diabetes for children.           Glucose   Date Value Ref Range Status   09/09/2021 134 (H) 65 - 99 mg/dL Final     Comment:                   Fasting reference interval     For someone without known diabetes, a glucose  value >125 mg/dL indicates that they may have  diabetes and this should be confirmed with a  follow-up test.        04/15/2021 124 (H) 65 - 99 mg/dL Final     Comment:                   Fasting reference interval     For someone without known diabetes, a glucose value  between 100 and 125 mg/dL is consistent with  prediabetes and should be confirmed with a  follow-up test.        12/15/2020 137 (H) 65 - 99 mg/dL Final     Comment:                   Fasting reference interval     For someone without known diabetes, a glucose  value >125 mg/dL indicates that they may have  diabetes and this should be confirmed with a  follow-up test.          Sodium   Date Value Ref Range Status   09/09/2021 138 135 - 146 mmol/L Final   04/15/2021 140 135 - 146 mmol/L Final   12/15/2020 139 135 - 146 mmol/L Final     Potassium   Date Value Ref Range Status   09/09/2021 4.6 3.5 -  5.3 mmol/L Final   04/15/2021 4.5 3.5 - 5.3 mmol/L Final   12/15/2020 4.3 3.5 - 5.3 mmol/L Final     Carbon Dioxide   Date Value Ref Range Status   09/09/2021 28 20 - 32 mmol/L Final   04/15/2021 26 20 - 32 mmol/L Final   12/15/2020 23 20 - 32 mmol/L Final     Chloride   Date Value Ref Range Status   09/09/2021 103 98 - 110 mmol/L Final   04/15/2021 104 98 - 110 mmol/L Final   12/15/2020 108 98 - 110 mmol/L Final     BUN   Date Value Ref Range Status   09/09/2021 20 7 - 25 mg/dL Final   04/15/2021 18 7 - 25 mg/dL Final   12/15/2020 20 7 - 25 mg/dL Final     Egfr Non-Afr. American   Date Value Ref Range Status   09/09/2021 86 > OR = 60 mL/min/1.73m2 Final   04/15/2021 92 > OR = 60 mL/min/1.73m2 Final     Egfr    Date Value Ref Range Status   09/09/2021 99 > OR = 60 mL/min/1.73m2 Final   04/15/2021 107 > OR = 60 mL/min/1.73m2 Final     Creatinine   Date Value Ref Range Status   09/09/2021 0.74 0.50 - 0.99 mg/dL Final     Comment:     For patients >49 years of age, the reference limit  for Creatinine is approximately 13% higher for people  identified as -American.        04/15/2021 0.68 0.50 - 0.99 mg/dL Final     Comment:     For patients >49 years of age, the reference limit  for Creatinine is approximately 13% higher for people  identified as -American.        12/15/2020 0.66 0.50 - 0.99 mg/dL Final     Comment:     For patients >49 years of age, the reference limit  for Creatinine is approximately 13% higher for people  identified as -American.          Calcium, Serum   Date Value Ref Range Status   09/04/2015 10.1 8.7 - 10.2 mg/dL      Alt   Date Value Ref Range Status   09/09/2021 97 (H) 6 - 29 U/L Final   12/15/2020 89 (H) 6 - 29 U/L Final   06/16/2020 89 (H) 6 - 29 U/L Final     Cholesterol, Total   Date Value Ref Range Status   04/15/2021 134 <200 mg/dL Final   06/16/2020 135 <200 mg/dL Final   03/10/2020 140 <200 mg/dL Final     Hdl Cholesterol   Date Value Ref Range  Status   04/15/2021 42 (L) > OR = 50 mg/dL Final   06/16/2020 46 (L) > OR = 50 mg/dL Final   03/10/2020 48 (L) > OR = 50 mg/dL Final     Triglycerides   Date Value Ref Range Status   04/15/2021 125 <150 mg/dL Final   06/16/2020 151 (H) <150 mg/dL Final   03/10/2020 112 <150 mg/dL Final     TSH   Date Value Ref Range Status   12/15/2020 3.03 0.40 - 4.50 mIU/L Final   11/08/2016 2.22 0.40 - 4.50 mIU/L      Comment:     Test performed at [facility] [address]  [id number] Director: SAMREEN COREY MD PHD   10/26/2015 2.10 0.34 - 5.6 mIU/L Final       Lab Results   Component Value Date    TSH 3.03 12/15/2020    FREET4 0.86 10/26/2015       Lab Results   Component Value Date    HGBA1C 6.4 (H) 09/09/2021       Lab Results   Component Value Date    GLUCOSE 134 (H) 09/09/2021    BUN 20 09/09/2021    CREATININE 0.74 09/09/2021    EGFR 86 09/09/2021    EGFR 99 09/09/2021    BUNCREAT 23 09/04/2015     09/09/2021    K 4.6 09/09/2021     09/09/2021    CO2 28 09/09/2021    CA 10.1 09/04/2015    PROTEIN 7.1 09/09/2021    ALBUMIN 4.3 09/09/2021    GLOB 2.8 09/09/2021    AGRATIO 1.5 09/09/2021    BILITOT 0.4 09/09/2021    ALKPHOS 68 09/09/2021    AST 60 (H) 09/09/2021    ALT 97 (H) 09/09/2021       Lab Results   Component Value Date     09/09/2021    K 4.6 09/09/2021     09/09/2021    CO2 28 09/09/2021    BUN 20 09/09/2021    CREATININE 0.74 09/09/2021    GLUCOSE 134 (H) 09/09/2021    CALCIUM 9.4 09/09/2021    EGFR 86 09/09/2021    EGFR 99 09/09/2021    ANIONGAP 10 12/05/2016       Lab Results   Component Value Date    CHOL 134 04/15/2021    TRIG 125 04/15/2021    HDL 42 (L) 04/15/2021    VLDL 27 09/04/2015    LDLCALC 71 04/15/2021       Lab Results   Component Value Date    CREATUR 138.2 09/04/2015    MICROALBUR 1.2 04/15/2021              Assessment/Plan     Type 2 diabetes mellitus without complication (CMS/HCC) (HCC)  Glycemic control at goal in 9/21 with A1c of  6.4%   Reviewed all diabetic blood testing results  Discussed home glucose and A1c goals  Rec ophtho, dental, podiatry, nutrition visits  Check testing 1 time per day  Cont metformin to 1g BID  Increase Trulicity to  4.5 mg weekly to aid in weight loss.   Cont Acarbose 50 mg pre meal TIDAC   Discussed med side effects and patient accepts risks    Dyslipidemia  LDL at goal in 4/2021 cont simvastatin 20 mg daily.   Yearly FLP check  Will need GI clearance before any statin dose titration in the future  Discussed med side effects    Essential hypertension  HTN at goal cont Quinapril 20 mg daily, defer management to PCP.   Check yearly urine mc    Hypovitaminosis D  Cont Vit D 2000 IU daily.   Check D levels yearly.     Abnormal LFTs  -  Suspect possibility of fatty  Liver disease given obesity, DM2 and IR but defer formal evaluation to PCP and GI team. Advised patient to f/u with GI. Dr Ocasio as scheduled     Obesity  The patient is asked to make an attempt to improve diet and exercise patterns to aid in medical management of this problem. GLP-1RA therapy may help with the same.       Patient expressed agreement and understanding with current plan. Patient aware to follow up as a scheduled or sooner as needed. Patient aware to call clinic in event of any clinical concerns.     I spent 35 minutes on this date of service performing the following activities: obtaining history, performing examination, entering orders, documenting, preparing for visit, obtaining / reviewing records, providing counseling and education and communicating results      RTC in 4 months

## 2021-09-30 ENCOUNTER — APPOINTMENT (OUTPATIENT)
Dept: URBAN - METROPOLITAN AREA CLINIC 200 | Age: 66
Setting detail: DERMATOLOGY
End: 2021-09-30

## 2021-09-30 DIAGNOSIS — L57.8 OTHER SKIN CHANGES DUE TO CHRONIC EXPOSURE TO NONIONIZING RADIATION: ICD-10-CM

## 2021-09-30 DIAGNOSIS — Z11.52 ENCOUNTER FOR SCREENING FOR COVID-19: ICD-10-CM

## 2021-09-30 PROBLEM — I10 ESSENTIAL (PRIMARY) HYPERTENSION: Status: ACTIVE | Noted: 2021-09-30

## 2021-09-30 PROBLEM — S80.862S: Status: ACTIVE | Noted: 2021-09-30

## 2021-09-30 PROCEDURE — OTHER SCREENING FOR COVID-19: OTHER

## 2021-09-30 PROCEDURE — OTHER COUNSELING: OTHER

## 2021-09-30 PROCEDURE — OTHER PRESCRIPTION: OTHER

## 2021-09-30 PROCEDURE — 99213 OFFICE O/P EST LOW 20 MIN: CPT

## 2021-09-30 PROCEDURE — OTHER SUNSCREEN RECOMMENDATIONS: OTHER

## 2021-09-30 RX ORDER — TRIAMCINOLONE ACETONIDE 1 MG/G
CREAM TOPICAL
Qty: 80 | Refills: 3 | Status: ERX | COMMUNITY
Start: 2021-09-30

## 2021-09-30 ASSESSMENT — LOCATION DETAILED DESCRIPTION DERM: LOCATION DETAILED: PERIUMBILICAL SKIN

## 2021-09-30 ASSESSMENT — LOCATION ZONE DERM: LOCATION ZONE: TRUNK

## 2021-09-30 ASSESSMENT — LOCATION SIMPLE DESCRIPTION DERM: LOCATION SIMPLE: ABDOMEN

## 2021-10-07 ENCOUNTER — OFFICE VISIT (OUTPATIENT)
Dept: OBSTETRICS AND GYNECOLOGY | Facility: CLINIC | Age: 66
End: 2021-10-07
Payer: COMMERCIAL

## 2021-10-07 VITALS — DIASTOLIC BLOOD PRESSURE: 80 MMHG | SYSTOLIC BLOOD PRESSURE: 140 MMHG | BODY MASS INDEX: 39.49 KG/M2 | WEIGHT: 209 LBS

## 2021-10-07 DIAGNOSIS — Z01.419 WELL WOMAN EXAM WITH ROUTINE GYNECOLOGICAL EXAM: Primary | ICD-10-CM

## 2021-10-07 PROCEDURE — 3008F BODY MASS INDEX DOCD: CPT | Performed by: OBSTETRICS & GYNECOLOGY

## 2021-10-07 PROCEDURE — 3079F DIAST BP 80-89 MM HG: CPT | Performed by: OBSTETRICS & GYNECOLOGY

## 2021-10-07 PROCEDURE — 3077F SYST BP >= 140 MM HG: CPT | Performed by: OBSTETRICS & GYNECOLOGY

## 2021-10-07 PROCEDURE — 99396 PREV VISIT EST AGE 40-64: CPT | Performed by: OBSTETRICS & GYNECOLOGY

## 2021-10-07 RX ORDER — ESTRADIOL 0.1 MG/G
CREAM VAGINAL
Qty: 42.5 G | Refills: 5 | Status: SHIPPED | OUTPATIENT
Start: 2021-10-07 | End: 2022-01-24

## 2021-10-07 NOTE — PROGRESS NOTES
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    3 children 35/20/27    For her mood she has been fine    Urine just okay    Heart lungs okay    Gynecologist dryness she is been trying Imvexxy I think we will do Estrace for 6 weeks and consider going back to an vaccine    Patient does have a fatty liver diabetes she is working on this with a hematologist    She had a total abdominal hysterectomy bilateral salpingo-oophorectomy for ovarian hyperthecosis she is done well since    Had a calcium index 2015 and 0    Mammogram [place] September 2021 and normal    Colonoscopy 3 years ago exam every 5 years    Labs reviewed from 9 of 2021 elevated LFTs elevated cholesterol she had a good hemoglobin A1c    She does have hypertension/cholesterol/diabetes    She is Covid vaccine    Pap test 10 of 2020    Family history cousin with breast cancer paternal grandfather with colon cancer    Her neck was supple no masses    Breast exam showed no masses    Abdomen soft nontender    Vaginal vault supported uterus and ovaries missing    Normal GYN checkup

## 2021-12-24 ENCOUNTER — HOSPITAL ENCOUNTER (OUTPATIENT)
Facility: CLINIC | Age: 66
Discharge: HOME | End: 2021-12-24
Attending: FAMILY MEDICINE
Payer: MEDICARE

## 2021-12-24 VITALS
TEMPERATURE: 97.5 F | OXYGEN SATURATION: 98 % | HEART RATE: 67 BPM | DIASTOLIC BLOOD PRESSURE: 89 MMHG | SYSTOLIC BLOOD PRESSURE: 156 MMHG

## 2021-12-24 DIAGNOSIS — J34.89 SINUS PRESSURE: ICD-10-CM

## 2021-12-24 DIAGNOSIS — R09.81 NASAL CONGESTION: Primary | ICD-10-CM

## 2021-12-24 LAB
EXPIRATION DATE: NORMAL
Lab: NORMAL
POCT MANUFACTURER: NORMAL
RAPID INFLUENZA A AGN: NEGATIVE
RAPID INFLUENZA B AGN: NEGATIVE

## 2021-12-24 PROCEDURE — 99203 OFFICE O/P NEW LOW 30 MIN: CPT | Performed by: FAMILY MEDICINE

## 2021-12-24 PROCEDURE — U0003 INFECTIOUS AGENT DETECTION BY NUCLEIC ACID (DNA OR RNA); SEVERE ACUTE RESPIRATORY SYNDROME CORONAVIRUS 2 (SARS-COV-2) (CORONAVIRUS DISEASE [COVID-19]), AMPLIFIED PROBE TECHNIQUE, MAKING USE OF HIGH THROUGHPUT TECHNOLOGIES AS DESCRIBED BY CMS-2020-01-R: HCPCS | Performed by: FAMILY MEDICINE

## 2021-12-24 PROCEDURE — 87804 INFLUENZA ASSAY W/OPTIC: CPT | Mod: QW | Performed by: FAMILY MEDICINE

## 2021-12-24 ASSESSMENT — ENCOUNTER SYMPTOMS
PSYCHIATRIC NEGATIVE: 1
SHORTNESS OF BREATH: 0
MUSCULOSKELETAL NEGATIVE: 1
FEVER: 0
WHEEZING: 0
SINUS PRESSURE: 1
GASTROINTESTINAL NEGATIVE: 1
CARDIOVASCULAR NEGATIVE: 1
COUGH: 1
NEUROLOGICAL NEGATIVE: 1
EYES NEGATIVE: 1
SINUS PAIN: 1

## 2021-12-24 NOTE — ED PROVIDER NOTES
History  Chief Complaint   Patient presents with   • Nasal Congestion     Sx started monday    • sinus pressure   • loss taste     started this morning      HPI    Past Medical History:   Diagnosis Date   • Anxiety    • Arthritis    • Claustrophobia    • Elevated liver enzymes     dx-  fatty liver   • Hypertension    • Impaired hearing     bilateral - no hearing aids   • Lipid disorder    • Numbness     left foot- secondary to previous  back injury    • Postmenopausal    • Sleep apnea     compliant with CPAP- nightly   • Tendonitis     left shoulder - treatment with PT   • Type 2 diabetes mellitus (CMS/HCC)    • Vertigo     ongoing- intermittent with position changes- treatment with meclizine and nasal spray       Past Surgical History:   Procedure Laterality Date   •  SECTION     • CHOLECYSTECTOMY     • DILATION AND EVACUATION     • ENDOMETRIAL ABLATION     • GALLBLADDER SURGERY     • HERNIA REPAIR     • HYSTERECTOMY     • KNEE ARTHROSCOPY Right     repair torn meniscus   • TONSILLECTOMY         Family History   Problem Relation Age of Onset   • Colon cancer Paternal Grandfather    • Diabetes Biological Mother    • Heart attack Biological Father        Social History     Tobacco Use   • Smoking status: Never Smoker   • Smokeless tobacco: Never Used   Vaping Use   • Vaping Use: Never used   Substance Use Topics   • Alcohol use: Yes     Comment: ocasional   • Drug use: No       Review of Systems   Constitutional: Negative for fever.   HENT: Positive for congestion, sinus pressure and sinus pain. Negative for ear discharge and ear pain.    Eyes: Negative.    Respiratory: Positive for cough. Negative for shortness of breath and wheezing.    Cardiovascular: Negative.    Gastrointestinal: Negative.    Genitourinary: Negative.    Musculoskeletal: Negative.    Skin: Negative for rash.   Neurological: Negative.    Psychiatric/Behavioral: Negative.        Physical Exam  ED Triage Vitals [21 1250]   Temp Heart  Rate Resp BP SpO2   36.4 °C (97.5 °F) 67 -- (!) 156/89 98 %      Temp Source Heart Rate Source Patient Position BP Location FiO2 (%) (Set)   Oral -- -- -- --       Physical Exam  Constitutional:       General: She is not in acute distress.  HENT:      Head:      Comments: No sinus tenderness     Right Ear: Tympanic membrane, ear canal and external ear normal. There is no impacted cerumen.      Left Ear: Tympanic membrane, ear canal and external ear normal. There is no impacted cerumen.      Nose: Congestion present.      Mouth/Throat:      Mouth: Mucous membranes are moist.      Pharynx: No oropharyngeal exudate or posterior oropharyngeal erythema.   Eyes:      General:         Right eye: No discharge.         Left eye: No discharge.      Conjunctiva/sclera: Conjunctivae normal.      Pupils: Pupils are equal, round, and reactive to light.   Cardiovascular:      Rate and Rhythm: Normal rate and regular rhythm.      Pulses: Normal pulses.      Heart sounds: Normal heart sounds.   Pulmonary:      Effort: Pulmonary effort is normal.      Breath sounds: Normal breath sounds. No wheezing, rhonchi or rales.   Musculoskeletal:      Cervical back: Normal range of motion and neck supple.   Lymphadenopathy:      Cervical: No cervical adenopathy.   Neurological:      Mental Status: She is alert.           Procedures  Procedures    UC Course  Clinical Impressions as of 12/24/21 1812   Nasal congestion   Sinus pressure       MDM  Number of Diagnoses or Management Options  Nasal congestion  Sinus pressure  Diagnosis management comments: Rapid flu done,negative  Sent for covid 19  She got covid vaccine already  She stated she was exposed to her daughter who is covid positive ,her daughter is pregnant ,she came in this morning to be seen and her daughter   Stated she got rapid test at home and positive                    Chely Martinez MD  12/24/21 1815

## 2021-12-24 NOTE — DISCHARGE INSTRUCTIONS
Fluids  Rest    Go to ER at [facility] if symptoms gets worse    May need chest xray if cough gets worse   Follow up with your primary care doctor in  1week  if still not improved   Stay home for now until the covid results

## 2021-12-25 LAB — SARS-COV-2 RNA RESP QL NAA+PROBE: NEGATIVE

## 2022-01-14 ENCOUNTER — TELEPHONE (OUTPATIENT)
Dept: SCHEDULING | Facility: CLINIC | Age: 67
End: 2022-01-14
Payer: MEDICARE

## 2022-01-14 NOTE — TELEPHONE ENCOUNTER
Pt cancelled OV on 1/25 due to scheduling conflict.     Pt prefers appts on Mondays, no upcoming Monday appts to offer.     Pt can be reached at #[phone].    Ty.

## 2022-01-19 NOTE — TELEPHONE ENCOUNTER
Spoke with the patient  She is scheduled on 3/16/2022 at 7:30 but did tell her if anything opens up sooner will give her a call

## 2022-01-19 NOTE — TELEPHONE ENCOUNTER
Patient is calling to follow up on request for a Monday or early AM Friday OV with Dr. Cooley.    She can be reached at: [phone]

## 2022-01-20 ENCOUNTER — TELEPHONE (OUTPATIENT)
Dept: ENDOCRINOLOGY | Facility: CLINIC | Age: 67
End: 2022-01-20
Payer: MEDICARE

## 2022-01-20 LAB
BUN SERPL-MCNC: 22 MG/DL (ref 7–25)
BUN/CREAT SERPL: ABNORMAL (CALC) (ref 6–22)
CALCIUM SERPL-MCNC: 9 MG/DL (ref 8.6–10.4)
CHLORIDE SERPL-SCNC: 105 MMOL/L (ref 98–110)
CO2 SERPL-SCNC: 26 MMOL/L (ref 20–32)
CREAT SERPL-MCNC: 0.72 MG/DL (ref 0.5–0.99)
GLUCOSE SERPL-MCNC: 110 MG/DL (ref 65–99)
HBA1C MFR BLD: 6.6 % OF TOTAL HGB
POTASSIUM SERPL-SCNC: 4.4 MMOL/L (ref 3.5–5.3)
QUEST EGFR AFRICAN AMERICAN: 102 ML/MIN/1.73M2
QUEST EGFR NON-AFR. AMERICAN: 88 ML/MIN/1.73M2
SODIUM SERPL-SCNC: 138 MMOL/L (ref 135–146)

## 2022-01-24 ENCOUNTER — OFFICE VISIT (OUTPATIENT)
Dept: ENDOCRINOLOGY | Facility: CLINIC | Age: 67
End: 2022-01-24
Payer: MEDICARE

## 2022-01-24 VITALS
BODY MASS INDEX: 39.65 KG/M2 | HEART RATE: 66 BPM | HEIGHT: 61 IN | DIASTOLIC BLOOD PRESSURE: 78 MMHG | OXYGEN SATURATION: 99 % | RESPIRATION RATE: 18 BRPM | WEIGHT: 210 LBS | SYSTOLIC BLOOD PRESSURE: 118 MMHG

## 2022-01-24 DIAGNOSIS — E11.9 TYPE 2 DIABETES MELLITUS WITHOUT COMPLICATION, UNSPECIFIED WHETHER LONG TERM INSULIN USE (CMS/HCC): Primary | ICD-10-CM

## 2022-01-24 DIAGNOSIS — E78.5 DYSLIPIDEMIA: ICD-10-CM

## 2022-01-24 LAB
EXPIRATION DATE: NORMAL
GLUCOSE BLOOD, POC: 112
Lab: NORMAL
POCT MANUFACTURER: NORMAL

## 2022-01-24 PROCEDURE — 82962 GLUCOSE BLOOD TEST: CPT | Performed by: NURSE PRACTITIONER

## 2022-01-24 PROCEDURE — G8752 SYS BP LESS 140: HCPCS | Performed by: NURSE PRACTITIONER

## 2022-01-24 PROCEDURE — 99213 OFFICE O/P EST LOW 20 MIN: CPT | Performed by: NURSE PRACTITIONER

## 2022-01-24 PROCEDURE — G8754 DIAS BP LESS 90: HCPCS | Performed by: NURSE PRACTITIONER

## 2022-01-24 ASSESSMENT — ENCOUNTER SYMPTOMS
CONSTITUTIONAL NEGATIVE: 1
NEUROLOGICAL NEGATIVE: 1
EYES NEGATIVE: 1
PSYCHIATRIC NEGATIVE: 1
MUSCULOSKELETAL NEGATIVE: 1
RESPIRATORY NEGATIVE: 1
ENDOCRINE NEGATIVE: 1
GASTROINTESTINAL NEGATIVE: 1
HEMATOLOGIC/LYMPHATIC NEGATIVE: 1
CARDIOVASCULAR NEGATIVE: 1

## 2022-01-24 NOTE — PROGRESS NOTES
Kailyn is a 66 y.o. female here for follow up for diabetes management.    Last office visit: 2021 with Dr. Engle    Events since last visit:  No changes to health.    Home diabetes regimen:  Metformin 1000 mg p.o. twice daily  Acarbose 50 mg p.o. 3 times a day with meals  Trulicity 4.5 mg subcu weekly    SMBG: Once a day before breakfast  Fastin-152  No hypoglycemia    Diet:   Meals: 3 meals a day  Breakfast: Turkey and cheese, cookie dipped in coffee, toast, hashbrownas  Lunch: Salad with protein and sandwich, PJ sandwich with whole-wheat bread  Dinner: Steak fajita with veggies.  Trying to cut back on pasta due to weight gain.  Snacks: Trying to cut back on eating cereal before bed  Fluids: Coffee with cream in the morning and water  Interested in trying intermittent fasting.    Exercise: 8-minute walk in the morning with her dog up a hill.  Babysits.  Likes to swim in the summer.  She is actively trying to lose weight.  Her goal is to to 200 pounds    Ophthalmology: UTD  Retinopathy: None     Podiatry: Self checks  Neuropathy: None    Patient reports she has a history of elevated LFTs and has an appointment to see Dr. Ocasio in GI.    Vitals:    22 1509   BP: 118/78   Pulse: 66   Resp: 18   SpO2: 99%        Current Outpatient Medications   Medication Sig Dispense Refill   • acarbose (PRECOSE) 50 mg tablet TAKE 1 TABLET BY ORAL ROUTE 3 TIMES EVERY DAY AT THE START (WITH THE FIRST BITE) OF EACH MAIN MEAL 270 tablet 3   • aspirin (ASPIR-81) 81 mg enteric coated tablet take 1 tablet by oral route  every day     • azelastine (ASTELIN) 137 mcg (0.1 %) nasal spray Administer 1 spray into each nostril daily. Use in each nostril as directed.     • b complex vitamins tablet No SIG Entered     • blood sugar diagnostic (CONTOUR NEXT TEST STRIPS) strip USE TO CHECK BG 3 TIMES A  strip 3   • blood-glucose meter (CONTOUR NEXT METER) misc To test 4x daily 1 each 0   • cholecalciferol, vitamin D3,  (VITAMIN D3) 1,000 unit capsule One tablet daily     • dulaglutide (TRULICITY) 4.5 mg/0.5 mL pen injector Inject 4.5 mg under the skin every (seven) 7 days. 6 mL 3   • folic acid (FOLVITE) 1 mg tablet Take 1 mg by mouth daily.     • IMVEXXY MAINTENANCE PACK 10 mcg insert INSERT 10 MCG INTO THE VAGINA 2 (TWO) TIMES A WEEK (MON, THU). 34 each 6   • lancets (MICROLET LANCET) misc CHECK BLOOD GLUCOSE 4 TIMES A  each 6   • metFORMIN (GLUCOPHAGE) 500 mg tablet Take 2 tablets (1,000 mg total) by mouth 2 (two) times a day with meals. 360 tablet 3   • omega-3 fatty acids-fish oil (omega-3) 300-1,000 mg capsule take 1 capsule daily     • omeprazole (PriLOSEC) 20 mg capsule Take 20 mg by mouth daily before breakfast.     • quinapriL (ACCUPRIL) 10 mg tablet Take 2 tablets (20 mg total) by mouth once daily. 180 tablet 3   • resvera/chrom/gr.tea/EGCG/dig3 (RESVERATROL DIET ORAL) Take by mouth.     • sertraline (ZOLOFT) 100 mg tablet Take 100 mg by mouth every evening.  2   • simvastatin (ZOCOR) 20 mg tablet TAKE 1 TABLET BY MOUTH EVERY DAY AT NIGHT 90 tablet 3   • zinc sulfate (ZINCATE) 220 (50) mg capsule Take 220 mg by mouth daily.     • estradioL (ESTRACE) 0.01 % (0.1 mg/gram) vaginal cream Apply nightly to  vagina for 1 week, then Monday/Wednesday/ Friday (Patient not taking: Reported on 1/24/2022 ) 42.5 g 5   • estradiol (IMVEXXY STARTER PACK) 10 mcg insert, dose pack Insert 10 mcg into the vagina daily. (Patient not taking: Reported on 1/24/2022 ) 38 each 6     No current facility-administered medications for this visit.       Review of Systems   Constitutional: Negative.    HENT: Negative.    Eyes: Negative.    Respiratory: Negative.    Cardiovascular: Negative.    Gastrointestinal: Negative.    Endocrine: Negative.    Genitourinary: Negative.    Musculoskeletal: Negative.    Skin: Negative.    Neurological: Negative.    Hematological: Negative.    Psychiatric/Behavioral: Negative.         Physical  Exam  Constitutional:       Appearance: Normal appearance. She is obese.   HENT:      Head: Normocephalic and atraumatic.      Mouth/Throat:      Mouth: Mucous membranes are moist.   Eyes:      Extraocular Movements: Extraocular movements intact.      Conjunctiva/sclera: Conjunctivae normal.   Cardiovascular:      Rate and Rhythm: Normal rate and regular rhythm.      Pulses: Normal pulses.      Heart sounds: Normal heart sounds.   Pulmonary:      Effort: Pulmonary effort is normal.      Breath sounds: Normal breath sounds.   Abdominal:      General: Abdomen is flat. Bowel sounds are normal.      Palpations: Abdomen is soft.   Musculoskeletal:         General: Normal range of motion.      Cervical back: Normal range of motion.   Skin:     General: Skin is warm and dry.   Neurological:      General: No focal deficit present.      Mental Status: She is alert.   Psychiatric:         Mood and Affect: Mood normal.          Lab Results   Component Value Date    GLUCOSE 110 (H) 01/20/2022    CALCIUM 9.0 01/20/2022     01/20/2022    K 4.4 01/20/2022    CO2 26 01/20/2022     01/20/2022    BUN 22 01/20/2022    CREATININE 0.72 01/20/2022      Lab Results   Component Value Date    HGBA1C 6.6 (H) 01/20/2022      Lab Results   Component Value Date    CHOL 134 04/15/2021    CHOL 135 06/16/2020    CHOL 140 03/10/2020     Lab Results   Component Value Date    HDL 42 (L) 04/15/2021    HDL 46 (L) 06/16/2020    HDL 48 (L) 03/10/2020     Lab Results   Component Value Date    LDLCALC 71 04/15/2021    LDLCALC 66 06/16/2020    LDLCALC 72 03/10/2020     Lab Results   Component Value Date    TRIG 125 04/15/2021    TRIG 151 (H) 06/16/2020    TRIG 112 03/10/2020     No results found for: CHOLHDL   Lab Results   Component Value Date    MICROALBUR 1.2 04/15/2021      Lab Results   Component Value Date    TSH 3.03 12/15/2020     1. Type 2 diabetes mellitus without complication, unspecified whether long term insulin use  (CMS/[facility])    Glycemic control has mildly deteriorated but remains at goal; hgb a1c now 6.6%, previously 6.4%.  Patient plans to try intermittent fasting and increase exercise level in order to promote weight loss.  Reviewed importance of continued dietary modification in order to ensure continued glycemic control.  I reviewed safe glycemic goals and targets.  I asked her to continue checking her blood sugar at least once a day before breakfast.  I asked her to continue current regimen with Metformin 1000 mg p.o. twice daily,  Acarbose 50 mg 3 times a day with meals, and Trulicity 4.5 mg subcu weekly.  She will return to clinic in 6 months with repeat labs.    2. Dyslipidemia  LDL level at goal.  Continue simvastatin 20 mg and fish oil 1000 mg po daily.  Further management per PCP.      This document was created using Dragon software.  Please excuse any spelling or grammatical errors.

## 2022-02-21 ENCOUNTER — TELEPHONE (OUTPATIENT)
Dept: ENDOCRINOLOGY | Facility: CLINIC | Age: 67
End: 2022-02-21
Payer: MEDICARE

## 2022-02-25 ENCOUNTER — TELEPHONE (OUTPATIENT)
Dept: ENDOCRINOLOGY | Facility: CLINIC | Age: 67
End: 2022-02-25
Payer: MEDICARE

## 2022-02-25 NOTE — TELEPHONE ENCOUNTER
Spoke to pt, had a couple highs in 200's , back to 133 this am , will call us back for consistently high glucoses over 200.

## 2022-02-25 NOTE — TELEPHONE ENCOUNTER
Pt left message with answering service asking for someone to call her back because of her high sugars, she is just getting over Covid. Could you call her?

## 2022-03-01 ENCOUNTER — TELEPHONE (OUTPATIENT)
Dept: CARDIOLOGY | Facility: CLINIC | Age: 67
End: 2022-03-01
Payer: MEDICARE

## 2022-03-01 DIAGNOSIS — R06.02 SOB (SHORTNESS OF BREATH): ICD-10-CM

## 2022-03-01 DIAGNOSIS — I35.0 NONRHEUMATIC AORTIC (VALVE) STENOSIS: Primary | ICD-10-CM

## 2022-03-01 NOTE — TELEPHONE ENCOUNTER
Please see prior.  Last OV June 2021.  Murmur noted.  Some SOB noted.  Only change seems SOB with 1 flight stairs.  Had Echo with plan to repeat in 1 year.  Has OV 3/16/2022 with BK.  Do you want Echo ordered?  Please advise.  Thank you.

## 2022-03-01 NOTE — TELEPHONE ENCOUNTER
Echo ordered.  Please pre-cert and assist with scheduling.  Sooner rather than later,please.  Thank you.

## 2022-03-01 NOTE — TELEPHONE ENCOUNTER
Dr. Cooley patient.    History notable for mild aortic stenosis and aortic regurgitation.    Mrs. Gifford saw her PCP yesterday for SOB evaluation.     By exam, she had a loud murmur and was directed to contact us for an ASAP echocardiogram and OV.    She developed an UR process in early February and was placed on an antibiotic.    On 2/14/22-tested + for COVID.    On 2/20/22-tested - for COVID.    She can't really say how long she's noted SOB.    + dyspnea after climbing one flight of stairs.    + mild dyspnea walking on level ground but can't specifically say after what distance.    No symptoms walking room to room in her house.    No rest dyspnea or PND.    No chest pain.    Call back # [phone].

## 2022-03-02 ENCOUNTER — OFFICE VISIT (OUTPATIENT)
Dept: CARDIOLOGY | Facility: CLINIC | Age: 67
End: 2022-03-02
Payer: MEDICARE

## 2022-03-02 VITALS
RESPIRATION RATE: 16 BRPM | SYSTOLIC BLOOD PRESSURE: 130 MMHG | TEMPERATURE: 98.6 F | WEIGHT: 213 LBS | BODY MASS INDEX: 40.22 KG/M2 | HEIGHT: 61 IN | DIASTOLIC BLOOD PRESSURE: 72 MMHG | HEART RATE: 85 BPM | OXYGEN SATURATION: 98 %

## 2022-03-02 DIAGNOSIS — I10 ESSENTIAL HYPERTENSION: Primary | ICD-10-CM

## 2022-03-02 PROCEDURE — G8754 DIAS BP LESS 90: HCPCS | Performed by: INTERNAL MEDICINE

## 2022-03-02 PROCEDURE — 93000 ELECTROCARDIOGRAM COMPLETE: CPT | Performed by: INTERNAL MEDICINE

## 2022-03-02 PROCEDURE — G8752 SYS BP LESS 140: HCPCS | Performed by: INTERNAL MEDICINE

## 2022-03-02 PROCEDURE — 99214 OFFICE O/P EST MOD 30 MIN: CPT | Performed by: INTERNAL MEDICINE

## 2022-03-02 ASSESSMENT — ENCOUNTER SYMPTOMS
EYES NEGATIVE: 1
GASTROINTESTINAL NEGATIVE: 1
DYSPNEA ON EXERTION: 1
HEMATOLOGIC/LYMPHATIC NEGATIVE: 1
NERVOUS/ANXIOUS: 1
WEIGHT GAIN: 1
NEUROLOGICAL NEGATIVE: 1
RESPIRATORY NEGATIVE: 1
BACK PAIN: 1

## 2022-03-02 NOTE — PROGRESS NOTES
Chief Complaint: I saw Ms. Gifford in the office today on a routine visit for her mild to moderate aortic stenosis and mild aortic regurgitation.  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  She has some shortness of breath with exertion but not with anxiety cold weather postprandially with sex ed at rest or nocturnally occasionally she gets short of breath climbing stairs she denies proximal nocturnal dyspnea orthopnea has rare palpitations never associated with lightheadedness or dizziness denies syncope denies ankle edema she has nocturia.  She is upset over her valvular diagnosis she is upset that she is gaining weight she is upset that her blood sugars are elevated.       Medications:  Current Outpatient Medications   Medication Sig Dispense Refill   • acarbose (PRECOSE) 50 mg tablet TAKE 1 TABLET BY ORAL ROUTE 3 TIMES EVERY DAY AT THE START (WITH THE FIRST BITE) OF EACH MAIN MEAL 270 tablet 3   • aspirin (ASPIR-81) 81 mg enteric coated tablet take 1 tablet by oral route  every day     • b complex vitamins tablet No SIG Entered     • blood sugar diagnostic (CONTOUR NEXT TEST STRIPS) strip USE TO CHECK BG 3 TIMES A  strip 3   • blood-glucose meter (CONTOUR NEXT METER) misc To test 4x daily 1 each 0   • cholecalciferol, vitamin D3, (VITAMIN D3) 1,000 unit capsule One tablet daily     • dulaglutide (TRULICITY) 4.5 mg/0.5 mL pen injector Inject 4.5 mg under the skin every (seven) 7 days. 6 mL 3   • folic acid (FOLVITE) 1 mg tablet Take 1 mg by mouth daily.     • IMVEXXY MAINTENANCE PACK 10 mcg insert INSERT 10 MCG INTO THE VAGINA 2 (TWO) TIMES A WEEK (MON, THU). 34 each 6   • lancets (MICROLET LANCET) misc CHECK BLOOD GLUCOSE 4 TIMES A  each 6   • metFORMIN (GLUCOPHAGE) 500 mg tablet Take 2 tablets (1,000 mg total) by mouth 2 (two) times a day with meals. 360 tablet 3   • omega-3 fatty acids-fish  oil (omega-3) 300-1,000 mg capsule take 1 capsule daily     • omeprazole (PriLOSEC) 20 mg capsule Take 20 mg by mouth daily before breakfast.     • quinapriL (ACCUPRIL) 20 mg tablet Take 20 mg by mouth daily.   180 tablet 3   • resvera/chrom/gr.tea/EGCG/dig3 (RESVERATROL DIET ORAL) Take by mouth.     • sertraline (ZOLOFT) 100 mg tablet Take 100 mg by mouth every evening.  2   • simvastatin (ZOCOR) 20 mg tablet Take 1 tablet (20 mg total) by mouth See admin instr. At Night 90 tablet 3   • zinc sulfate (ZINCATE) 220 (50) mg capsule Take 220 mg by mouth daily.     • azelastine (ASTELIN) 137 mcg (0.1 %) nasal spray Administer 1 spray into each nostril daily. Use in each nostril as directed.       No current facility-administered medications for this visit.       Review of Systems:  Review of Systems   Constitutional: Positive for weight gain.   HENT: Positive for congestion and ear pain.    Eyes: Negative.    Cardiovascular: Positive for dyspnea on exertion.   Respiratory: Negative.    Endocrine: Positive for heat intolerance.   Hematologic/Lymphatic: Negative.    Skin: Negative.    Musculoskeletal: Positive for back pain.   Gastrointestinal: Negative.    Genitourinary: Positive for nocturia.   Neurological: Negative.    Psychiatric/Behavioral: The patient is nervous/anxious.    Allergic/Immunologic: Positive for environmental allergies.       Physical Exam:  Vitals:    03/02/22 1329   BP: 130/72   Pulse: 85   Resp: 16   Temp: 37 °C (98.6 °F)   SpO2: 98%     Physical Exam  Constitutional:       Appearance: She is well-developed. She is obese.   HENT:      Head: Normocephalic and atraumatic.   Eyes:      Conjunctiva/sclera: Conjunctivae normal.      Pupils: Pupils are equal, round, and reactive to light.   Cardiovascular:      Rate and Rhythm: Normal rate and regular rhythm.      Pulses: Intact distal pulses.      Heart sounds: Normal heart sounds.   Pulmonary:      Effort: Pulmonary effort is normal.      Breath sounds:  Normal breath sounds.   Abdominal:      General: Bowel sounds are normal.      Palpations: Abdomen is soft.   Musculoskeletal:         General: Normal range of motion.      Cervical back: Normal range of motion and neck supple.   Skin:     General: Skin is warm and dry.   Neurological:      Mental Status: She is alert and oriented to person, place, and time.      Deep Tendon Reflexes: Reflexes are normal and symmetric.   Psychiatric:         Speech: Speech normal.         Behavior: Behavior normal.         Thought Content: Thought content normal.         Judgment: Judgment normal.       EKG: SR YFN T wave  abn    Assessment and Plan:  Impression:  Dyspnea.  Aortic stenosis.  Aortic regurgitation.  Obesity.  Diabetes mellitus type 2.  Hyperlipidemia.  Recommendation:  She appears to be hemodynamically stable there does not appear to be any congestive heart failure at this point.  We are going to repeat her echocardiogram this week and we will forward the results to you and call her with results.  I reassured her I will think this is progression of her disease you have already ordered a chest x-ray to see if she has pneumonia status post Covid.  Clinically she does not appear to have pneumonia.  We will see her again in 6 months time, if there is a problem we will see her sooner.  Thank you for allowing us see this nice lady in the office today.    Lupillo Cooley, DO

## 2022-03-02 NOTE — LETTER
March 2, 2022     Billy Antunez DO  [address] [facility] [address]    Patient: Citlali Gifford  YOB: 1955  Date of Visit: 3/2/2022      Dear Dr. Antunez:    Thank you for referring Citlali Gifford to me for evaluation. Below are my notes for this consultation.    If you have questions, please do not hesitate to call me. I look forward to following your patient along with you.         Sincerely,        Lupillo Cooley DO        CC: No Recipients  Lupillo Cooley DO  3/2/2022  2:13 PM  Signed      Chief Complaint: I saw Ms. Gifford in the office today on a routine visit for her mild to moderate aortic stenosis and mild aortic regurgitation.  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  She has some shortness of breath with exertion but not with anxiety cold weather postprandially with sex ed at rest or nocturnally occasionally she gets short of breath climbing stairs she denies proximal nocturnal dyspnea orthopnea has rare palpitations never associated with lightheadedness or dizziness denies syncope denies ankle edema she has nocturia.  She is upset over her valvular diagnosis she is upset that she is gaining weight she is upset that her blood sugars are elevated.       Medications:  Current Outpatient Medications   Medication Sig Dispense Refill   • acarbose (PRECOSE) 50 mg tablet TAKE 1 TABLET BY ORAL ROUTE 3 TIMES EVERY DAY AT THE START (WITH THE FIRST BITE) OF EACH MAIN MEAL 270 tablet 3   • aspirin (ASPIR-81) 81 mg enteric coated tablet take 1 tablet by oral route  every day     • b complex vitamins tablet No SIG Entered     • blood sugar diagnostic (CONTOUR NEXT TEST STRIPS) strip USE TO CHECK BG 3 TIMES A  strip 3   • blood-glucose meter (CONTOUR NEXT METER) misc To test 4x daily 1 each 0   • cholecalciferol, vitamin D3, (VITAMIN D3) 1,000 unit capsule One tablet daily      • dulaglutide (TRULICITY) 4.5 mg/0.5 mL pen injector Inject 4.5 mg under the skin every (seven) 7 days. 6 mL 3   • folic acid (FOLVITE) 1 mg tablet Take 1 mg by mouth daily.     • IMVEXXY MAINTENANCE PACK 10 mcg insert INSERT 10 MCG INTO THE VAGINA 2 (TWO) TIMES A WEEK (MON, THU). 34 each 6   • lancets (MICROLET LANCET) misc CHECK BLOOD GLUCOSE 4 TIMES A  each 6   • metFORMIN (GLUCOPHAGE) 500 mg tablet Take 2 tablets (1,000 mg total) by mouth 2 (two) times a day with meals. 360 tablet 3   • omega-3 fatty acids-fish oil (omega-3) 300-1,000 mg capsule take 1 capsule daily     • omeprazole (PriLOSEC) 20 mg capsule Take 20 mg by mouth daily before breakfast.     • quinapriL (ACCUPRIL) 20 mg tablet Take 20 mg by mouth daily.   180 tablet 3   • resvera/chrom/gr.tea/EGCG/dig3 (RESVERATROL DIET ORAL) Take by mouth.     • sertraline (ZOLOFT) 100 mg tablet Take 100 mg by mouth every evening.  2   • simvastatin (ZOCOR) 20 mg tablet Take 1 tablet (20 mg total) by mouth See admin instr. At Night 90 tablet 3   • zinc sulfate (ZINCATE) 220 (50) mg capsule Take 220 mg by mouth daily.     • azelastine (ASTELIN) 137 mcg (0.1 %) nasal spray Administer 1 spray into each nostril daily. Use in each nostril as directed.       No current facility-administered medications for this visit.       Review of Systems:  Review of Systems   Constitutional: Positive for weight gain.   HENT: Positive for congestion and ear pain.    Eyes: Negative.    Cardiovascular: Positive for dyspnea on exertion.   Respiratory: Negative.    Endocrine: Positive for heat intolerance.   Hematologic/Lymphatic: Negative.    Skin: Negative.    Musculoskeletal: Positive for back pain.   Gastrointestinal: Negative.    Genitourinary: Positive for nocturia.   Neurological: Negative.    Psychiatric/Behavioral: The patient is nervous/anxious.    Allergic/Immunologic: Positive for environmental allergies.       Physical Exam:  Vitals:    03/02/22 1329   BP: 130/72    Pulse: 85   Resp: 16   Temp: 37 °C (98.6 °F)   SpO2: 98%     Physical Exam  Constitutional:       Appearance: She is well-developed. She is obese.   HENT:      Head: Normocephalic and atraumatic.   Eyes:      Conjunctiva/sclera: Conjunctivae normal.      Pupils: Pupils are equal, round, and reactive to light.   Cardiovascular:      Rate and Rhythm: Normal rate and regular rhythm.      Pulses: Intact distal pulses.      Heart sounds: Normal heart sounds.   Pulmonary:      Effort: Pulmonary effort is normal.      Breath sounds: Normal breath sounds.   Abdominal:      General: Bowel sounds are normal.      Palpations: Abdomen is soft.   Musculoskeletal:         General: Normal range of motion.      Cervical back: Normal range of motion and neck supple.   Skin:     General: Skin is warm and dry.   Neurological:      Mental Status: She is alert and oriented to person, place, and time.      Deep Tendon Reflexes: Reflexes are normal and symmetric.   Psychiatric:         Speech: Speech normal.         Behavior: Behavior normal.         Thought Content: Thought content normal.         Judgment: Judgment normal.       EKG: SR YFN T wave  abn    Assessment and Plan:  Impression:  Dyspnea.  Aortic stenosis.  Aortic regurgitation.  Obesity.  Diabetes mellitus type 2.  Hyperlipidemia.  Recommendation:  She appears to be hemodynamically stable there does not appear to be any congestive heart failure at this point.  We are going to repeat her echocardiogram this week and we will forward the results to you and call her with results.  I reassured her I will think this is progression of her disease you have already ordered a chest x-ray to see if she has pneumonia status post Covid.  Clinically she does not appear to have pneumonia.  We will see her again in 6 months time, if there is a problem we will see her sooner.  Thank you for allowing us see this nice lady in the office today.    Lupillo Cooley,

## 2022-03-09 ENCOUNTER — HOSPITAL ENCOUNTER (OUTPATIENT)
Dept: CARDIOLOGY | Facility: HOSPITAL | Age: 67
Discharge: HOME | End: 2022-03-09
Attending: INTERNAL MEDICINE
Payer: MEDICARE

## 2022-03-09 VITALS — WEIGHT: 213 LBS | BODY MASS INDEX: 40.22 KG/M2 | HEIGHT: 61 IN

## 2022-03-09 DIAGNOSIS — I35.0 NONRHEUMATIC AORTIC (VALVE) STENOSIS: ICD-10-CM

## 2022-03-09 DIAGNOSIS — R06.02 SOB (SHORTNESS OF BREATH): ICD-10-CM

## 2022-03-09 LAB
AORTIC ROOT ANNULUS - M-MODE: 2.99 CM
AORTIC VALVE AREA: 0.86 SQUARE CENTIMETERS PER SQUARE METER
AORTIC VALVE AT: 118.79 MS
AORTIC VALVE MEAN VELOCITY: 2.24 M/S
AORTIC VALVE VELOCITY TIME INTEGRAL: 61 CM
AV MEAN GRADIENT: 21.54 MMHG
AV PEAK GRADIENT: 36.22 MMHG
AV PEAK VELOCITY-S: 3.01 M/S
BSA FOR ECHO PROCEDURE: 2.04 M2
DOP CALC LVOT STROKE VOLUME: 105.94 ML
E WAVE DECELERATION TIME: 319.98 MS
E/A RATIO: 0.75
E/E' RATIO: 8.41
E/LAT E' RATIO: 7.15
EDV (BP): 59.06 ML
EF (A4C): 87.35 %
EF A2C: 76.42 %
EJECTION FRACTION: 82.6 %
ESV (BP): 10.27 ML
INTERVENTRICULAR SEPTUM: 1.19 CM
LA ESV (BP): 37.57 ML
LA ESV INDEX (A2C): 17.65 ML/M2
LA ESV INDEX (BP): 18.42 ML/M2
LAAS-AP2: 15.63 CM2
LAAS-AP4: 11.88 CM2
LAV-S: 36 ML
LEFT ATRIUM VOLUME INDEX: 14.07 ML/M2
LEFT ATRIUM VOLUME: 28.7 ML
LEFT INTERNAL DIMENSION IN SYSTOLE: 2.01 CM (ref 2.76–4.17)
LEFT VENTRICLE DIASTOLIC VOLUME INDEX: 28.68 ML/M2
LEFT VENTRICLE DIASTOLIC VOLUME: 58.5 ML
LEFT VENTRICLE MASS INDEX: 97.26 G/M2
LEFT VENTRICLE SYSTOLIC VOLUME INDEX: 3.63 ML/M2
LEFT VENTRICLE SYSTOLIC VOLUME: 7.4 ML
LEFT VENTRICULAR INTERNAL DIMENSION IN DIASTOLE: 4.4 CM (ref 4.68–6.49)
LEFT VENTRICULAR MASS: 198.41 G
LEFT VENTRICULAR POSTERIOR WALL IN END DIASTOLE: 1.27 CM (ref 0.61–1.14)
LV DIASTOLIC VOLUME: 58.1 ML
LV ESV (APICAL 2 CHAMBER): 13.7 ML
LVCI: 1.58 LITERS PER MINUTE PER SQUARE METER
LVCO: 3.07 LITERS PER MINUTE
LVEDVI(A2C): 28.48 ML/M2
LVEDVI(BP): 28.95 ML/M2
LVESVI(A2C): 6.72 ML/M2
LVESVI(BP): 5.03 ML/M2
LVOT 2D: 2.19 CM
LVOT A: 3.78 CM2
LVOT MG: 4.4 MMHG
LVOT MV: 1.02 M/S
LVOT PEAK VELOCITY: 1.33 M/S
LVOT PG: 7.13 MMHG
LVOT VTI: 28.14 CM
MV E'TISSUE VEL-LAT: 0.07 M/S
MV E'TISSUE VEL-MED: 0.06 M/S
MV PEAK A VEL: 0.62 M/S
MV PEAK E VEL: 0.47 M/S
MV VALVE AREA P 1/2 METHOD: 2.37 CM2
Z-SCORE OF LEFT VENTRICULAR DIMENSION IN END DIASTOLE: -2.25
Z-SCORE OF LEFT VENTRICULAR DIMENSION IN END SYSTOLE: -4.13
Z-SCORE OF LEFT VENTRICULAR POSTERIOR WALL IN END DIASTOLE: 2.2

## 2022-03-09 PROCEDURE — 93306 TTE W/DOPPLER COMPLETE: CPT

## 2022-03-09 PROCEDURE — 93306 TTE W/DOPPLER COMPLETE: CPT | Mod: 26 | Performed by: INTERNAL MEDICINE

## 2022-04-14 DIAGNOSIS — E11.9 TYPE 2 DIABETES MELLITUS WITHOUT COMPLICATION, UNSPECIFIED WHETHER LONG TERM INSULIN USE (CMS/HCC): ICD-10-CM

## 2022-04-14 RX ORDER — LANCETS 33 GAUGE
EACH MISCELLANEOUS
Qty: 200 EACH | Refills: 3 | Status: SHIPPED | OUTPATIENT
Start: 2022-04-14 | End: 2023-07-18

## 2022-04-14 RX ORDER — BLOOD-GLUCOSE METER
EACH MISCELLANEOUS
Qty: 200 STRIP | Refills: 3 | Status: SHIPPED | OUTPATIENT
Start: 2022-04-14 | End: 2023-05-04

## 2022-04-14 RX ORDER — DEXTROSE 4 G
TABLET,CHEWABLE ORAL
Qty: 1 EACH | Refills: 0 | Status: SHIPPED | OUTPATIENT
Start: 2022-04-14 | End: 2022-05-25

## 2022-04-14 NOTE — TELEPHONE ENCOUNTER
Medicine Refill Request    Last Office: 10/6/2020   Last Consult Visit: Visit date not found  Last Telemedicine Visit: Visit date not found    Next Appointment: Visit date not found      Current Outpatient Medications:   •  acarbose (PRECOSE) 50 mg tablet, TAKE 1 TABLET BY ORAL ROUTE 3 TIMES EVERY DAY AT THE START (WITH THE FIRST BITE) OF EACH MAIN MEAL, Disp: 270 tablet, Rfl: 3  •  aspirin (ASPIR-81) 81 mg enteric coated tablet, take 1 tablet by oral route  every day, Disp: , Rfl:   •  azelastine (ASTELIN) 137 mcg (0.1 %) nasal spray, Administer 1 spray into each nostril daily. Use in each nostril as directed., Disp: , Rfl:   •  b complex vitamins tablet, No SIG Entered, Disp: , Rfl:   •  blood sugar diagnostic (CONTOUR NEXT TEST STRIPS) strip, USE TO CHECK BG 3 TIMES A DAY, Disp: 100 strip, Rfl: 3  •  blood-glucose meter (CONTOUR NEXT METER) misc, To test 4x daily, Disp: 1 each, Rfl: 0  •  cholecalciferol, vitamin D3, (VITAMIN D3) 1,000 unit capsule, One tablet daily, Disp: , Rfl:   •  dulaglutide (TRULICITY) 4.5 mg/0.5 mL pen injector, Inject 4.5 mg under the skin every (seven) 7 days., Disp: 6 mL, Rfl: 3  •  folic acid (FOLVITE) 1 mg tablet, Take 1 mg by mouth daily., Disp: , Rfl:   •  IMVEXXY MAINTENANCE PACK 10 mcg insert, INSERT 10 MCG INTO THE VAGINA 2 (TWO) TIMES A WEEK (MON, THU)., Disp: 34 each, Rfl: 6  •  lancets (MICROLET LANCET) misc, CHECK BLOOD GLUCOSE 4 TIMES A DAY, Disp: 200 each, Rfl: 6  •  metFORMIN (GLUCOPHAGE) 500 mg tablet, Take 2 tablets (1,000 mg total) by mouth 2 (two) times a day with meals., Disp: 360 tablet, Rfl: 3  •  omega-3 fatty acids-fish oil (omega-3) 300-1,000 mg capsule, take 1 capsule daily, Disp: , Rfl:   •  omeprazole (PriLOSEC) 20 mg capsule, Take 20 mg by mouth daily before breakfast., Disp: , Rfl:   •  quinapriL (ACCUPRIL) 20 mg tablet, Take 20 mg by mouth daily.  , Disp: 180 tablet, Rfl: 3  •  resvera/chrom/gr.tea/EGCG/dig3 (RESVERATROL DIET ORAL), Take by mouth., Disp: ,  Rfl:   •  sertraline (ZOLOFT) 100 mg tablet, Take 100 mg by mouth every evening., Disp: , Rfl: 2  •  simvastatin (ZOCOR) 20 mg tablet, Take 1 tablet (20 mg total) by mouth See admin instr. At Night, Disp: 90 tablet, Rfl: 3  •  zinc sulfate (ZINCATE) 220 (50) mg capsule, Take 220 mg by mouth daily., Disp: , Rfl:       BP Readings from Last 3 Encounters:   03/02/22 130/72   01/24/22 118/78   12/24/21 (!) 156/89       Recent Lab results:  Lab Results   Component Value Date    CHOL 134 04/15/2021   ,   Lab Results   Component Value Date    HDL 42 (L) 04/15/2021   ,   Lab Results   Component Value Date    LDLCALC 71 04/15/2021   ,   Lab Results   Component Value Date    TRIG 125 04/15/2021        Lab Results   Component Value Date    GLUCOSE 110 (H) 01/20/2022   ,   Lab Results   Component Value Date    HGBA1C 6.6 (H) 01/20/2022         Lab Results   Component Value Date    CREATININE 0.72 01/20/2022       Lab Results   Component Value Date    TSH 3.03 12/15/2020

## 2022-04-15 RX ORDER — ESTRADIOL 10 UG/1
10 INSERT VAGINAL 2 TIMES WEEKLY
Qty: 34 EACH | Refills: 6 | Status: SHIPPED | OUTPATIENT
Start: 2022-04-18 | End: 2022-10-17

## 2022-04-19 ENCOUNTER — TELEPHONE (OUTPATIENT)
Dept: OBSTETRICS AND GYNECOLOGY | Facility: CLINIC | Age: 67
End: 2022-04-19
Payer: MEDICARE

## 2022-06-10 DIAGNOSIS — E11.9 TYPE 2 DIABETES MELLITUS WITHOUT COMPLICATION, WITHOUT LONG-TERM CURRENT USE OF INSULIN (CMS/HCC): ICD-10-CM

## 2022-06-10 RX ORDER — ACARBOSE 50 MG/1
TABLET ORAL
Qty: 270 TABLET | Refills: 3 | Status: SHIPPED | OUTPATIENT
Start: 2022-06-10 | End: 2022-08-01 | Stop reason: SDUPTHER

## 2022-06-10 NOTE — TELEPHONE ENCOUNTER
Last Office Visit: 1/24/2022  Last Telemedicine Visit: 4/9/2020 Ana Vazquez MD    Next Office Visit: 7/11/2022  Next Telemedicine Visit: Visit date not found   LBW 01/20/2022  Acarbose

## 2022-06-14 NOTE — ASSESSMENT & PLAN NOTE
Diabetes mellitus type 2 well controlled  Discussed home glucose and A1c goals  Rec ophtho, dental, podiatry, nutrition visits  Rec low consistent carb diet outlined, regular exercise, weight loss  Check testing 4 times per day  Trulicity 1.5 mg weekly  Metformin 1000 mg BID  Acarbose 50 mg TID  Discussed med side effects  Check testing for next visit  Spent 35 min with pt care greater than 3/4 directly with pt      
Dyslipidemia not at goal  Start simvastatin 10 mg daily  Check 6 month FLP   Discussed med side effects    
HTN not at goal  Increase to quinapril 15 mg daily  Discussed med side effects  Check yearly urine mc neg    
Discharge order

## 2022-06-17 ENCOUNTER — RX ONLY (RX ONLY)
Age: 67
End: 2022-06-17

## 2022-06-17 RX ORDER — BETAMETHASONE DIPROPIONATE 0.5 MG/G
CREAM, AUGMENTED TOPICAL
Qty: 15 | Refills: 2 | Status: ERX | COMMUNITY
Start: 2022-06-17

## 2022-06-29 ENCOUNTER — TELEPHONE (OUTPATIENT)
Dept: SURGERY | Facility: CLINIC | Age: 67
End: 2022-06-29
Payer: MEDICARE

## 2022-06-29 NOTE — TELEPHONE ENCOUNTER
She had a last colonoscopy July 2018.  5-year follow-up will be due July 2023.  Health maintenance was updated so she will see it in [facility]

## 2022-07-06 ENCOUNTER — TELEPHONE (OUTPATIENT)
Dept: SCHEDULING | Facility: CLINIC | Age: 67
End: 2022-07-06
Payer: MEDICARE

## 2022-07-06 NOTE — TELEPHONE ENCOUNTER
Pt called to cancel/reschedule 7/12/22 appt.    No appt available.    Please call pt to reschedule    Pt Kailyn can be reached at [phone]

## 2022-07-22 ENCOUNTER — TELEPHONE (OUTPATIENT)
Dept: OBSTETRICS AND GYNECOLOGY | Facility: CLINIC | Age: 67
End: 2022-07-22
Payer: MEDICARE

## 2022-07-22 ENCOUNTER — TELEPHONE (OUTPATIENT)
Dept: ENDOCRINOLOGY | Facility: CLINIC | Age: 67
End: 2022-07-22
Payer: MEDICARE

## 2022-07-22 NOTE — TELEPHONE ENCOUNTER
Pt is currently taking Imvexxy states she changed her insurance and now it is too expensive.  Can she get something else that is not as expensive??

## 2022-07-22 NOTE — TELEPHONE ENCOUNTER
Called pt, she called her insurance and they told her that an auth for a Tier cost reduction has to be done.

## 2022-07-22 NOTE — TELEPHONE ENCOUNTER
Pt requested office call [employer] for tier reduction for Trulicity.  [employer] - [phone] called and PA submitted verbally

## 2022-07-25 NOTE — TELEPHONE ENCOUNTER
Spoke to [employer]-they are processing Imvexxy medication from a tier 3 to a tier 2 pending approval Case ID# [id number] will let us know in 3-5 days--Pt aware

## 2022-07-26 ENCOUNTER — TELEPHONE (OUTPATIENT)
Dept: OBSTETRICS AND GYNECOLOGY | Facility: CLINIC | Age: 67
End: 2022-07-26
Payer: MEDICARE

## 2022-07-26 ENCOUNTER — TELEPHONE (OUTPATIENT)
Dept: ENDOCRINOLOGY | Facility: CLINIC | Age: 67
End: 2022-07-26
Payer: MEDICARE

## 2022-07-26 DIAGNOSIS — E11.9 TYPE 2 DIABETES MELLITUS WITHOUT COMPLICATION, WITHOUT LONG-TERM CURRENT USE OF INSULIN (CMS/HCC): ICD-10-CM

## 2022-07-26 RX ORDER — METFORMIN HYDROCHLORIDE 500 MG/1
1000 TABLET ORAL 2 TIMES DAILY WITH MEALS
Qty: 360 TABLET | Refills: 3 | Status: SHIPPED | OUTPATIENT
Start: 2022-07-26 | End: 2023-03-10

## 2022-07-29 LAB
BUN SERPL-MCNC: 22 MG/DL (ref 7–25)
BUN/CREAT SERPL: ABNORMAL (CALC) (ref 6–22)
CALCIUM SERPL-MCNC: 9.5 MG/DL (ref 8.6–10.4)
CHLORIDE SERPL-SCNC: 103 MMOL/L (ref 98–110)
CHOLEST SERPL-MCNC: 131 MG/DL
CHOLEST/HDLC SERPL: 3.4 (CALC)
CO2 SERPL-SCNC: 25 MMOL/L (ref 20–32)
CREAT SERPL-MCNC: 0.7 MG/DL (ref 0.5–1.05)
EGFRCR SERPLBLD CKD-EPI 2021: 96 ML/MIN/1.73M2
GLUCOSE SERPL-MCNC: 127 MG/DL (ref 65–99)
HBA1C MFR BLD: 6.5 % OF TOTAL HGB
HDLC SERPL-MCNC: 39 MG/DL
LDLC SERPL CALC-MCNC: 71 MG/DL (CALC)
NONHDLC SERPL-MCNC: 92 MG/DL (CALC)
POTASSIUM SERPL-SCNC: 4.4 MMOL/L (ref 3.5–5.3)
SODIUM SERPL-SCNC: 137 MMOL/L (ref 135–146)
TRIGL SERPL-MCNC: 131 MG/DL

## 2022-08-01 ENCOUNTER — OFFICE VISIT (OUTPATIENT)
Dept: ENDOCRINOLOGY | Facility: CLINIC | Age: 67
End: 2022-08-01
Payer: MEDICARE

## 2022-08-01 VITALS
RESPIRATION RATE: 18 BRPM | SYSTOLIC BLOOD PRESSURE: 128 MMHG | WEIGHT: 203 LBS | BODY MASS INDEX: 38.33 KG/M2 | OXYGEN SATURATION: 98 % | HEART RATE: 72 BPM | HEIGHT: 61 IN | DIASTOLIC BLOOD PRESSURE: 84 MMHG

## 2022-08-01 DIAGNOSIS — E11.9 TYPE 2 DIABETES MELLITUS WITHOUT COMPLICATION, UNSPECIFIED WHETHER LONG TERM INSULIN USE (CMS/HCC): Primary | ICD-10-CM

## 2022-08-01 DIAGNOSIS — E11.9 TYPE 2 DIABETES MELLITUS WITHOUT COMPLICATION, WITHOUT LONG-TERM CURRENT USE OF INSULIN (CMS/HCC): ICD-10-CM

## 2022-08-01 LAB
EXPIRATION DATE: NORMAL
GLUCOSE BLOOD, POC: 139
Lab: NORMAL
POCT MANUFACTURER: NORMAL

## 2022-08-01 PROCEDURE — 99214 OFFICE O/P EST MOD 30 MIN: CPT | Performed by: NURSE PRACTITIONER

## 2022-08-01 PROCEDURE — G8754 DIAS BP LESS 90: HCPCS | Performed by: NURSE PRACTITIONER

## 2022-08-01 PROCEDURE — G8752 SYS BP LESS 140: HCPCS | Performed by: NURSE PRACTITIONER

## 2022-08-01 PROCEDURE — 82962 GLUCOSE BLOOD TEST: CPT | Performed by: NURSE PRACTITIONER

## 2022-08-01 RX ORDER — ACARBOSE 100 MG/1
TABLET ORAL
Qty: 270 TABLET | Refills: 3 | Status: SHIPPED | OUTPATIENT
Start: 2022-08-01 | End: 2023-08-08

## 2022-08-01 ASSESSMENT — ENCOUNTER SYMPTOMS
NEUROLOGICAL NEGATIVE: 1
CONSTITUTIONAL NEGATIVE: 1
PSYCHIATRIC NEGATIVE: 1
CARDIOVASCULAR NEGATIVE: 1
MUSCULOSKELETAL NEGATIVE: 1
RESPIRATORY NEGATIVE: 1
GASTROINTESTINAL NEGATIVE: 1
EYES NEGATIVE: 1
HEMATOLOGIC/LYMPHATIC NEGATIVE: 1
ENDOCRINE NEGATIVE: 1

## 2022-08-01 NOTE — PROGRESS NOTES
Kailyn is a 66 y.o. female here for follow up for diabetes management.     Previously a pt of Dr. Kadie ADAM with me 2022     Events since last visit:  No changes to health.     Home diabetes regimen:  Metformin 1000 mg p.o. twice daily  Acarbose 50 mg p.o. 3 times a day with meals  Trulicity 4.5 mg subcu weekly    Trulicity is too expensive.     SMBG:   BG logs reviewed today.  Fastin-189  Evenin's  No hypoglycemia     Diet:   Meals: 3 meals a day  Breakfast: Turkey and cheese, cookie dipped in coffee, toast, hashbrownas  Lunch: Salad with protein and sandwich, PJ sandwich with whole-wheat bread  Dinner: Steak fajita with veggies.  Trying to cut back on pasta due to weight gain.  Snacks: Trying to cut back on eating cereal before bed  Fluids: Coffee with cream in the morning and water  Interested in trying intermittent fasting.    Reports she plans to cut out all sugar due to her blood sugar levels.    Exercise: 8-minute walk in the morning with her dog up a hill.  Babysits.  Likes to swim in the summer.  She is actively trying to lose weight.  Her goal is to to 200 pounds-lost 7 lbs     Ophthalmology:   UTD  Retinopathy: None      Podiatry:   Self checks  Neuropathy: None    Wt Readings from Last 3 Encounters:   22 92.1 kg (203 lb)   22 96.6 kg (213 lb)   22 96.6 kg (213 lb)     Temp Readings from Last 3 Encounters:   22 37 °C (98.6 °F)   21 36.4 °C (97.5 °F) (Oral)   21 37.1 °C (98.8 °F)     BP Readings from Last 3 Encounters:   22 128/84   22 130/72   22 118/78     Pulse Readings from Last 3 Encounters:   22 72   22 85   22 66     Review of Systems   Constitutional: Negative.    HENT: Negative.    Eyes: Negative.    Respiratory: Negative.    Cardiovascular: Negative.    Gastrointestinal: Negative.    Endocrine: Negative.    Genitourinary: Negative.    Musculoskeletal: Negative.         Abn male-patterned hair growth and  acne. Hx partial hysterectomy, bilateral oophorectomy 20 +years ago. Reports she was never dx with PCOS.   Skin: Negative.    Neurological: Negative.    Hematological: Negative.    Psychiatric/Behavioral: Negative.      Physical Exam  Vitals reviewed.   Constitutional:       Appearance: Normal appearance. She is obese.   Cardiovascular:      Rate and Rhythm: Normal rate and regular rhythm.      Pulses: Normal pulses.      Heart sounds: Murmur heard.   Pulmonary:      Effort: Pulmonary effort is normal.      Breath sounds: Normal breath sounds.   Abdominal:      General: Abdomen is flat. Bowel sounds are normal.      Palpations: Abdomen is soft.   Musculoskeletal:         General: Normal range of motion.   Skin:     General: Skin is warm and dry.   Neurological:      General: No focal deficit present.      Mental Status: She is alert and oriented to person, place, and time.   Psychiatric:         Mood and Affect: Mood normal.         Lab Results   Component Value Date    GLUCOSE 127 (H) 07/29/2022    CALCIUM 9.5 07/29/2022     07/29/2022    K 4.4 07/29/2022    CO2 25 07/29/2022     07/29/2022    BUN 22 07/29/2022    CREATININE 0.70 07/29/2022     Lab Results   Component Value Date    CHOL 131 07/29/2022    CHOL 134 04/15/2021    CHOL 135 06/16/2020     Lab Results   Component Value Date    HDL 39 (L) 07/29/2022    HDL 42 (L) 04/15/2021    HDL 46 (L) 06/16/2020     Lab Results   Component Value Date    LDLCALC 71 07/29/2022    LDLCALC 71 04/15/2021    LDLCALC 66 06/16/2020     Lab Results   Component Value Date    TRIG 131 07/29/2022    TRIG 125 04/15/2021    TRIG 151 (H) 06/16/2020     No results found for: CHOLHDL  Lab Results   Component Value Date    HGBA1C 6.5 (H) 07/29/2022     Lab Results   Component Value Date    MICROALBUR 1.2 04/15/2021     1. Type 2 diabetes mellitus without complication, without on long term insulin use (CMS/[facility])  Glycemic control has improved and remains at goal; A1c  6.5%.  She reports occasional dietary indiscretion.  She is now in the donut hole and Trulicity will no longer be covered by her insurance.     I asked her to use up Trulicity and then stop.  Increase acarbose to 100 mg 3 times a day with meals.  Continue metformin 1000 mg twice a day.  Continue checking blood sugars twice a day before meals.    Reviewed importance of continued dietary modification in order to ensure continued glycemic control.  I reviewed safe glycemic goals and targets.  She will return to clinic in 6 months with repeat labs.    2.  Hypertension  BP in office today at goal.  She is on quinapril 20 mg daily.  Management per PCP.     3. Dyslipidemia  LDL level at goal.  Continue simvastatin 20 mg and fish oil 1000 mg po daily.  Further management per PCP.     4.  Hirsutism/acne  I asked patient to follow-up with OB/GYN.    5.  Obesity  BMI 38  Discussed importance of dietary and lifestyle modification in order to promote weight reduction.  She is currently on GLP-1 which will hopefully assist with weight management.      This document was created using Dragon software.  Please excuse any spelling or grammatical errors.

## 2022-08-03 ENCOUNTER — OFFICE VISIT (OUTPATIENT)
Dept: CARDIOLOGY | Facility: CLINIC | Age: 67
End: 2022-08-03
Payer: MEDICARE

## 2022-08-03 VITALS
OXYGEN SATURATION: 97 % | TEMPERATURE: 98.6 F | BODY MASS INDEX: 38.51 KG/M2 | HEIGHT: 61 IN | WEIGHT: 204 LBS | RESPIRATION RATE: 14 BRPM | SYSTOLIC BLOOD PRESSURE: 114 MMHG | DIASTOLIC BLOOD PRESSURE: 60 MMHG | HEART RATE: 74 BPM

## 2022-08-03 DIAGNOSIS — I10 ESSENTIAL HYPERTENSION: Primary | ICD-10-CM

## 2022-08-03 DIAGNOSIS — R09.89 BILATERAL CAROTID BRUITS: ICD-10-CM

## 2022-08-03 DIAGNOSIS — I35.0 NONRHEUMATIC AORTIC VALVE STENOSIS: ICD-10-CM

## 2022-08-03 PROCEDURE — 93000 ELECTROCARDIOGRAM COMPLETE: CPT | Performed by: INTERNAL MEDICINE

## 2022-08-03 PROCEDURE — G8752 SYS BP LESS 140: HCPCS | Performed by: INTERNAL MEDICINE

## 2022-08-03 PROCEDURE — G8754 DIAS BP LESS 90: HCPCS | Performed by: INTERNAL MEDICINE

## 2022-08-03 PROCEDURE — 99214 OFFICE O/P EST MOD 30 MIN: CPT | Performed by: INTERNAL MEDICINE

## 2022-08-03 ASSESSMENT — ENCOUNTER SYMPTOMS
HEMATOLOGIC/LYMPHATIC NEGATIVE: 1
EYES NEGATIVE: 1
MYALGIAS: 1
RESPIRATORY NEGATIVE: 1
DEPRESSION: 1
NERVOUS/ANXIOUS: 1
GASTROINTESTINAL NEGATIVE: 1
DYSPNEA ON EXERTION: 1
WEIGHT LOSS: 1
BACK PAIN: 1
NUMBNESS: 1

## 2022-08-03 NOTE — LETTER
August 3, 2022     Billy Antunez DO  [address] [facility] [address]    Patient: Citlali Gifford  YOB: 1955  Date of Visit: 8/3/2022      Dear Dr. Antunez:    Thank you for referring Citlali Gifford to me for evaluation. Below are my notes for this consultation.    If you have questions, please do not hesitate to call me. I look forward to following your patient along with you.         Sincerely,        Lupillo Cooley DO        CC: No Recipients  Lupillo Cooley DO  8/3/2022  8:45 AM  Signed      Chief Complaint: I saw Kailyn in the office today on a routine visit for her hypertension and aortic stenosis.  She is doing well offers complaints of shortness of breath only with climbing hills she is no longer short of breath climbing stairs.  She is walking more and she is having problems with her left knee.  She denies chest discomfort but has shortness of breath walking up a hill but no other circumstance walking flat, she denies chest discomfort or shortness of breath with anxiety, cold weather, postprandially, at rest, or nocturnally.  She denies exertional dyspnea stairs, proximal nocturnal dyspnea, orthopnea, palpitations, syncope, ankle edema, she has nocturia.       Medications:  Current Outpatient Medications   Medication Sig Dispense Refill   • acarbose (PRECOSE) 100 mg tablet Take 1 tab oral 3 x daily at start of each meal 3 tabs daily 270 tablet 3   • aspirin 81 mg enteric coated tablet take 1 tablet by oral route  every day     • azelastine (ASTELIN) 137 mcg (0.1 %) nasal spray Administer 1 spray into each nostril daily. Use in each nostril as directed.     • b complex vitamins tablet No SIG Entered     • blood sugar diagnostic (ONETOUCH VERIO TEST STRIPS) strip Check glucose 3 times a  day 200 strip 3   • blood-glucose meter (ONETOUCH VERIO FLEX METER) misc CHECK GLUCOSE 3 TIMES ADAY 300 each 3   • cholecalciferol, vitamin D3, 25 mcg (1,000 unit) capsule One tablet daily      • folic acid (FOLVITE) 1 mg tablet Take 1 mg by mouth daily.     • IMVEXXY MAINTENANCE PACK 10 mcg insert Insert 10 mcg into the vagina 2 (two) times a week (Mon, Thu). 34 each 6   • lancets (MICROLET LANCET) misc CHECK BLOOD GLUCOSE 4 TIMES A  each 6   • lancets (ONETOUCH DELICA LANCETS) 33 gauge misc Check glucose 3 times aday 200 each 3   • metFORMIN (GLUCOPHAGE) 500 mg tablet Take 2 tablets (1,000 mg total) by mouth 2 (two) times a day with meals. 360 tablet 3   • omega-3 fatty acids-fish oil 300-1,000 mg capsule take 1 capsule daily     • omeprazole (PriLOSEC) 20 mg capsule Take 20 mg by mouth daily before breakfast.     • quinapriL (ACCUPRIL) 20 mg tablet Take 20 mg by mouth daily.   180 tablet 3   • resvera/chrom/gr.tea/EGCG/dig3 (RESVERATROL DIET ORAL) Take by mouth.     • sertraline (ZOLOFT) 100 mg tablet Take 100 mg by mouth every evening.  2   • simvastatin (ZOCOR) 20 mg tablet Take 1 tablet (20 mg total) by mouth See admin instr. At Night 90 tablet 3   • zinc sulfate (ZINCATE) 220 (50) mg capsule Take 220 mg by mouth daily.       No current facility-administered medications for this visit.       Review of Systems:  Review of Systems   Constitutional: Positive for weight loss.   HENT: Positive for congestion and ear pain.    Eyes: Negative.    Cardiovascular: Positive for dyspnea on exertion.   Respiratory: Negative.    Endocrine: Positive for heat intolerance.   Hematologic/Lymphatic: Negative.    Skin: Negative.    Musculoskeletal: Positive for back pain, joint pain and myalgias.   Gastrointestinal: Negative.    Genitourinary: Positive for nocturia.   Neurological: Positive for numbness.   Psychiatric/Behavioral: Positive for depression. The patient is nervous/anxious.    Allergic/Immunologic: Positive for environmental allergies.       Physical Exam:  Vitals:    08/03/22 0746   BP: 114/60   Pulse: 74   Resp: 14   Temp: 37 °C (98.6 °F)   SpO2: 97%     Physical Exam  Constitutional:        Appearance: She is well-developed. She is obese.   HENT:      Head: Normocephalic and atraumatic.   Eyes:      Conjunctiva/sclera: Conjunctivae normal.      Pupils: Pupils are equal, round, and reactive to light.   Neck:      Vascular: Carotid bruit (Bilateral carotid bruiots) present.   Cardiovascular:      Rate and Rhythm: Normal rate and regular rhythm.      Pulses: Intact distal pulses.      Heart sounds: Murmur (AS,MR,TR) heard.     Gallop present.   Pulmonary:      Effort: Pulmonary effort is normal.      Breath sounds: Normal breath sounds.   Abdominal:      General: Bowel sounds are normal.      Palpations: Abdomen is soft.   Musculoskeletal:         General: Normal range of motion.      Cervical back: Normal range of motion and neck supple.   Skin:     General: Skin is warm and dry.   Neurological:      Mental Status: She is alert and oriented to person, place, and time.      Deep Tendon Reflexes: Reflexes are normal and symmetric.   Psychiatric:         Speech: Speech normal.         Behavior: Behavior normal.         Thought Content: Thought content normal.         Judgment: Judgment normal.       EKG: SR T eave abn no change    Assessment and Plan:  Impression:  Exertional dyspnea secondary to deconditioning and mild aortic stenosis.  Mitral regurgitation.  Tricuspid vegetation.  Hypertension.  Hypercholesterolemia.  Obesity.  Anxiety.  Recommendation: She appears to be hemodynamically stable I did not make any changes in her medications today.  I did not hear carotid bruits so I will order carotid Dopplers to follow these to make sure there is no significant atherosclerosis of her carotids to go along with her aortic stenosis.  We will get those done a month before she returns which will be in 6 months, if there is a problem we will see her sooner.  Thank you for allowing us see this nice but anxious woman in the office today.  We counseled her on diet and exercise.    Lupillo Cooley,

## 2022-08-03 NOTE — PROGRESS NOTES
Chief Complaint: I saw Kailyn in the office today on a routine visit for her hypertension and aortic stenosis.  She is doing well offers complaints of shortness of breath only with climbing hills she is no longer short of breath climbing stairs.  She is walking more and she is having problems with her left knee.  She denies chest discomfort but has shortness of breath walking up a hill but no other circumstance walking flat, she denies chest discomfort or shortness of breath with anxiety, cold weather, postprandially, at rest, or nocturnally.  She denies exertional dyspnea stairs, proximal nocturnal dyspnea, orthopnea, palpitations, syncope, ankle edema, she has nocturia.       Medications:  Current Outpatient Medications   Medication Sig Dispense Refill   • acarbose (PRECOSE) 100 mg tablet Take 1 tab oral 3 x daily at start of each meal 3 tabs daily 270 tablet 3   • aspirin 81 mg enteric coated tablet take 1 tablet by oral route  every day     • azelastine (ASTELIN) 137 mcg (0.1 %) nasal spray Administer 1 spray into each nostril daily. Use in each nostril as directed.     • b complex vitamins tablet No SIG Entered     • blood sugar diagnostic (ONETOUCH VERIO TEST STRIPS) strip Check glucose 3 times a  day 200 strip 3   • blood-glucose meter (ONETOUCH VERIO FLEX METER) misc CHECK GLUCOSE 3 TIMES ADAY 300 each 3   • cholecalciferol, vitamin D3, 25 mcg (1,000 unit) capsule One tablet daily     • folic acid (FOLVITE) 1 mg tablet Take 1 mg by mouth daily.     • IMVEXXY MAINTENANCE PACK 10 mcg insert Insert 10 mcg into the vagina 2 (two) times a week (Mon, Thu). 34 each 6   • lancets (MICROLET LANCET) misc CHECK BLOOD GLUCOSE 4 TIMES A  each 6   • lancets (ONETOUCH DELICA LANCETS) 33 gauge misc Check glucose 3 times aday 200 each 3   • metFORMIN (GLUCOPHAGE) 500 mg tablet Take 2 tablets (1,000 mg total) by mouth 2 (two) times a day with meals. 360 tablet 3   • omega-3 fatty acids-fish oil 300-1,000 mg  capsule take 1 capsule daily     • omeprazole (PriLOSEC) 20 mg capsule Take 20 mg by mouth daily before breakfast.     • quinapriL (ACCUPRIL) 20 mg tablet Take 20 mg by mouth daily.   180 tablet 3   • resvera/chrom/gr.tea/EGCG/dig3 (RESVERATROL DIET ORAL) Take by mouth.     • sertraline (ZOLOFT) 100 mg tablet Take 100 mg by mouth every evening.  2   • simvastatin (ZOCOR) 20 mg tablet Take 1 tablet (20 mg total) by mouth See admin instr. At Night 90 tablet 3   • zinc sulfate (ZINCATE) 220 (50) mg capsule Take 220 mg by mouth daily.       No current facility-administered medications for this visit.       Review of Systems:  Review of Systems   Constitutional: Positive for weight loss.   HENT: Positive for congestion and ear pain.    Eyes: Negative.    Cardiovascular: Positive for dyspnea on exertion.   Respiratory: Negative.    Endocrine: Positive for heat intolerance.   Hematologic/Lymphatic: Negative.    Skin: Negative.    Musculoskeletal: Positive for back pain, joint pain and myalgias.   Gastrointestinal: Negative.    Genitourinary: Positive for nocturia.   Neurological: Positive for numbness.   Psychiatric/Behavioral: Positive for depression. The patient is nervous/anxious.    Allergic/Immunologic: Positive for environmental allergies.       Physical Exam:  Vitals:    08/03/22 0746   BP: 114/60   Pulse: 74   Resp: 14   Temp: 37 °C (98.6 °F)   SpO2: 97%     Physical Exam  Constitutional:       Appearance: She is well-developed. She is obese.   HENT:      Head: Normocephalic and atraumatic.   Eyes:      Conjunctiva/sclera: Conjunctivae normal.      Pupils: Pupils are equal, round, and reactive to light.   Neck:      Vascular: Carotid bruit (Bilateral carotid bruiots) present.   Cardiovascular:      Rate and Rhythm: Normal rate and regular rhythm.      Pulses: Intact distal pulses.      Heart sounds: Murmur (AS,MR,TR) heard.     Gallop present.   Pulmonary:      Effort: Pulmonary effort is normal.      Breath  sounds: Normal breath sounds.   Abdominal:      General: Bowel sounds are normal.      Palpations: Abdomen is soft.   Musculoskeletal:         General: Normal range of motion.      Cervical back: Normal range of motion and neck supple.   Skin:     General: Skin is warm and dry.   Neurological:      Mental Status: She is alert and oriented to person, place, and time.      Deep Tendon Reflexes: Reflexes are normal and symmetric.   Psychiatric:         Speech: Speech normal.         Behavior: Behavior normal.         Thought Content: Thought content normal.         Judgment: Judgment normal.       EKG: SR T eave abn no change    Assessment and Plan:  Impression:  Exertional dyspnea secondary to deconditioning and mild aortic stenosis.  Mitral regurgitation.  Tricuspid vegetation.  Hypertension.  Hypercholesterolemia.  Obesity.  Anxiety.  Recommendation: She appears to be hemodynamically stable I did not make any changes in her medications today.  I did not hear carotid bruits so I will order carotid Dopplers to follow these to make sure there is no significant atherosclerosis of her carotids to go along with her aortic stenosis.  We will get those done a month before she returns which will be in 6 months, if there is a problem we will see her sooner.  Thank you for allowing us see this nice but anxious woman in the office today.  We counseled her on diet and exercise.    Lupillo Cooley, DO

## 2022-09-09 ENCOUNTER — APPOINTMENT (OUTPATIENT)
Dept: URBAN - METROPOLITAN AREA CLINIC 203 | Age: 67
Setting detail: DERMATOLOGY
End: 2022-09-18

## 2022-09-09 DIAGNOSIS — Z11.52 ENCOUNTER FOR SCREENING FOR COVID-19: ICD-10-CM

## 2022-09-09 DIAGNOSIS — L81.4 OTHER MELANIN HYPERPIGMENTATION: ICD-10-CM

## 2022-09-09 DIAGNOSIS — D22 MELANOCYTIC NEVI: ICD-10-CM

## 2022-09-09 DIAGNOSIS — L57.8 OTHER SKIN CHANGES DUE TO CHRONIC EXPOSURE TO NONIONIZING RADIATION: ICD-10-CM

## 2022-09-09 PROBLEM — D22.5 MELANOCYTIC NEVI OF TRUNK: Status: ACTIVE | Noted: 2022-09-09

## 2022-09-09 PROBLEM — D23.71 OTHER BENIGN NEOPLASM OF SKIN OF RIGHT LOWER LIMB, INCLUDING HIP: Status: ACTIVE | Noted: 2022-09-09

## 2022-09-09 PROCEDURE — 99213 OFFICE O/P EST LOW 20 MIN: CPT

## 2022-09-09 PROCEDURE — OTHER COUNSELING: OTHER

## 2022-09-09 PROCEDURE — OTHER SUNSCREEN RECOMMENDATIONS: OTHER

## 2022-09-09 PROCEDURE — OTHER SCREENING FOR COVID-19: OTHER

## 2022-09-09 PROCEDURE — OTHER REASSURANCE: OTHER

## 2022-09-09 ASSESSMENT — LOCATION DETAILED DESCRIPTION DERM
LOCATION DETAILED: LEFT INFERIOR UPPER BACK
LOCATION DETAILED: LEFT MEDIAL BREAST 10-11:00 REGION
LOCATION DETAILED: LEFT MEDIAL BREAST 11-12:00 REGION
LOCATION DETAILED: EPIGASTRIC SKIN
LOCATION DETAILED: LEFT MEDIAL SUPERIOR CHEST

## 2022-09-09 ASSESSMENT — LOCATION SIMPLE DESCRIPTION DERM
LOCATION SIMPLE: CHEST
LOCATION SIMPLE: ABDOMEN
LOCATION SIMPLE: LEFT UPPER BACK
LOCATION SIMPLE: LEFT BREAST

## 2022-09-09 ASSESSMENT — LOCATION ZONE DERM: LOCATION ZONE: TRUNK

## 2022-11-07 ENCOUNTER — OFFICE VISIT (OUTPATIENT)
Dept: OBSTETRICS AND GYNECOLOGY | Facility: CLINIC | Age: 67
End: 2022-11-07
Payer: MEDICARE

## 2022-11-07 VITALS — WEIGHT: 206.8 LBS | SYSTOLIC BLOOD PRESSURE: 120 MMHG | DIASTOLIC BLOOD PRESSURE: 70 MMHG | BODY MASS INDEX: 39.07 KG/M2

## 2022-11-07 DIAGNOSIS — Z01.419 WELL WOMAN EXAM WITH ROUTINE GYNECOLOGICAL EXAM: Primary | ICD-10-CM

## 2022-11-07 PROCEDURE — G0101 CA SCREEN;PELVIC/BREAST EXAM: HCPCS | Mod: GA | Performed by: OBSTETRICS & GYNECOLOGY

## 2022-11-07 PROCEDURE — G8752 SYS BP LESS 140: HCPCS | Performed by: OBSTETRICS & GYNECOLOGY

## 2022-11-07 PROCEDURE — G8754 DIAS BP LESS 90: HCPCS | Performed by: OBSTETRICS & GYNECOLOGY

## 2022-11-07 RX ORDER — ESTRADIOL 0.1 MG/G
CREAM VAGINAL
Qty: 42.5 G | Refills: 5 | Status: SHIPPED | OUTPATIENT
Start: 2022-11-07 | End: 2024-03-25

## 2022-11-07 NOTE — PROGRESS NOTES
Patient seen today 66 years old she has 3 children at home 36-year-old/30-year-old/27 she has 3 grandchildren    She is retired    Moods noncontributory    Heart lungs noncontributory    Gynecologic she is on Imvexxy with little effect regular switch to Estrace this is been ordered    Had a total abdominal hysterectomy bilateral salpingo-oophorectomy for ovarian hyperthecosis in the past no issues    Mammogram Jessica 2/9/2022    Colonoscopy 2023 is due    Labs she has diabetes high cholesterol    She has fatty liver she sees a hepatologist    COVID-vaccine    Pap test 2 years    DEXA scan none    Family history none dad had a myocardial infarction at 74    Neck supple no masses    Breast exam showed no masses    Abdomen soft nontender    Uterus midposition no adnexal masses    Normal GYN checkup plan as above

## 2022-11-14 ENCOUNTER — TELEPHONE (OUTPATIENT)
Dept: ENDOCRINOLOGY | Facility: CLINIC | Age: 67
End: 2022-11-14

## 2022-11-14 NOTE — TELEPHONE ENCOUNTER
Pt called to report her sugars have been running 150,s to 180's since she stopped the Trulicity, was not always taking meds before she eats which I told her to do, one of her other doctors told her about Victoza, which she might want to try, said she would be willing to come in sooner to see you if want.

## 2022-11-15 RX ORDER — LIRAGLUTIDE 6 MG/ML
0.6 INJECTION SUBCUTANEOUS DAILY
Qty: 9 ML | Refills: 3 | Status: SHIPPED | OUTPATIENT
Start: 2022-11-15 | End: 2023-03-17 | Stop reason: SDUPTHER

## 2022-12-01 DIAGNOSIS — E11.9 TYPE 2 DIABETES MELLITUS WITHOUT COMPLICATION, UNSPECIFIED WHETHER LONG TERM INSULIN USE (CMS/HCC): Primary | ICD-10-CM

## 2022-12-01 RX ORDER — BLOOD SUGAR DIAGNOSTIC
STRIP MISCELLANEOUS
Qty: 100 EACH | Refills: 3 | Status: SHIPPED | OUTPATIENT
Start: 2022-12-01 | End: 2023-10-12

## 2022-12-07 ENCOUNTER — TELEPHONE (OUTPATIENT)
Dept: ENDOCRINOLOGY | Facility: CLINIC | Age: 67
End: 2022-12-07
Payer: MEDICARE

## 2022-12-13 LAB
ALBUMIN/CREAT UR: 12 MCG/MG CREAT
BUN SERPL-MCNC: 24 MG/DL (ref 7–25)
BUN/CREAT SERPL: ABNORMAL (CALC) (ref 6–22)
CALCIUM SERPL-MCNC: 9.4 MG/DL (ref 8.6–10.4)
CHLORIDE SERPL-SCNC: 103 MMOL/L (ref 98–110)
CHOLEST SERPL-MCNC: 147 MG/DL
CHOLEST/HDLC SERPL: 3.5 (CALC)
CO2 SERPL-SCNC: 26 MMOL/L (ref 20–32)
CREAT SERPL-MCNC: 0.69 MG/DL (ref 0.5–1.05)
CREAT UR-MCNC: 68 MG/DL (ref 20–275)
EGFRCR SERPLBLD CKD-EPI 2021: 96 ML/MIN/1.73M2
GLUCOSE SERPL-MCNC: 132 MG/DL (ref 65–99)
HBA1C MFR BLD: 7.1 % OF TOTAL HGB
HDLC SERPL-MCNC: 42 MG/DL
LDLC SERPL CALC-MCNC: 79 MG/DL (CALC)
MICROALBUMIN UR-MCNC: 0.8 MG/DL
NONHDLC SERPL-MCNC: 105 MG/DL (CALC)
POTASSIUM SERPL-SCNC: 4.4 MMOL/L (ref 3.5–5.3)
SODIUM SERPL-SCNC: 138 MMOL/L (ref 135–146)
TRIGL SERPL-MCNC: 166 MG/DL

## 2022-12-15 ENCOUNTER — OFFICE VISIT (OUTPATIENT)
Dept: ENDOCRINOLOGY | Facility: CLINIC | Age: 67
End: 2022-12-15
Payer: MEDICARE

## 2022-12-15 VITALS
BODY MASS INDEX: 38.97 KG/M2 | SYSTOLIC BLOOD PRESSURE: 138 MMHG | DIASTOLIC BLOOD PRESSURE: 74 MMHG | HEIGHT: 61 IN | OXYGEN SATURATION: 98 % | HEART RATE: 75 BPM | WEIGHT: 206.4 LBS

## 2022-12-15 DIAGNOSIS — E11.9 TYPE 2 DIABETES MELLITUS WITHOUT COMPLICATION, UNSPECIFIED WHETHER LONG TERM INSULIN USE (CMS/HCC): Primary | ICD-10-CM

## 2022-12-15 LAB
EXPIRATION DATE: NORMAL
GLUCOSE BLOOD, POC: 129
Lab: NORMAL
POCT MANUFACTURER: NORMAL

## 2022-12-15 PROCEDURE — G8752 SYS BP LESS 140: HCPCS | Performed by: NURSE PRACTITIONER

## 2022-12-15 PROCEDURE — 82962 GLUCOSE BLOOD TEST: CPT | Performed by: NURSE PRACTITIONER

## 2022-12-15 PROCEDURE — G8754 DIAS BP LESS 90: HCPCS | Performed by: NURSE PRACTITIONER

## 2022-12-15 PROCEDURE — 99214 OFFICE O/P EST MOD 30 MIN: CPT | Performed by: NURSE PRACTITIONER

## 2022-12-15 ASSESSMENT — ENCOUNTER SYMPTOMS
PSYCHIATRIC NEGATIVE: 1
HEMATOLOGIC/LYMPHATIC NEGATIVE: 1
RESPIRATORY NEGATIVE: 1
CONSTITUTIONAL NEGATIVE: 1
EYES NEGATIVE: 1
MUSCULOSKELETAL NEGATIVE: 1
NEUROLOGICAL NEGATIVE: 1
ENDOCRINE NEGATIVE: 1
GASTROINTESTINAL NEGATIVE: 1
CARDIOVASCULAR NEGATIVE: 1

## 2022-12-15 NOTE — PATIENT INSTRUCTIONS
-If BG >150 after 1 week, increase Victoza to 1.2 mg a day.  -Continue Metformin and Acarbose  -see nutritionist  -lab in 3 months    Thank you for scheduling an appointment with me today!    I hope that we have answered all of your questions and considered your problems.      If you have any issues before your next appointment, please let me know.     You may call [phone] for any questions or concerns.     Sincerely,    [facility]

## 2022-12-15 NOTE — PROGRESS NOTES
Kailyn is a 66 y.o. female here for follow up for diabetes management.     LOV with me 2022     Events since last visit:  Sugars higher d/t stopping Trulcity  Now on Victoza 0.6 mg daily (just started yesterday)  More family stress- babysitting  Dietary indiscretion over holidays     Home diabetes regimen:  Metformin 1000 mg p.o. twice daily  Acarbose 100 mg p.o. 3 times a day with meals    missing some doses of acarbose during the day    Previous meds not currently taking:  Trulicity is too expensive.     SMBG:   BG logs reviewed today.  Fastin-160, some over 200     Diet:   Meals: 3 meals a day  Soup and salad for lunch     Exercise:   10-minute walk in the morning with her dog up a hill.    Trying to lift wts 3 times a day at home     Ophthalmology:   UTD, LOV 2022  Retinopathy: None      Podiatry:   Self checks  Neuropathy: None  Sees podiatry every 3 months     Wt Readings from Last 3 Encounters:   12/15/22 93.6 kg (206 lb 6.4 oz)   22 93.8 kg (206 lb 12.8 oz)   22 92.5 kg (204 lb)     Temp Readings from Last 3 Encounters:   22 37 °C (98.6 °F)   22 37 °C (98.6 °F)   21 36.4 °C (97.5 °F) (Oral)     BP Readings from Last 3 Encounters:   12/15/22 138/74   22 120/70   22 114/60     Pulse Readings from Last 3 Encounters:   12/15/22 75   22 74   22 72     Review of Systems   Constitutional: Negative.    HENT: Negative.    Eyes: Negative.    Respiratory: Negative.    Cardiovascular: Negative.    Gastrointestinal: Negative.    Endocrine: Negative.    Genitourinary: Negative.    Musculoskeletal: Negative.    Skin: Negative.    Neurological: Negative.    Hematological: Negative.    Psychiatric/Behavioral: Negative.      Physical Exam  Vitals reviewed.   Constitutional:       Appearance: Normal appearance. She is obese.   Cardiovascular:      Rate and Rhythm: Normal rate and regular rhythm.      Pulses: Normal pulses.      Heart sounds: Murmur heard.    Pulmonary:      Effort: Pulmonary effort is normal.      Breath sounds: Normal breath sounds.   Abdominal:      General: Abdomen is flat.      Palpations: Abdomen is soft.   Musculoskeletal:         General: Normal range of motion.      Cervical back: Neck supple.   Skin:     General: Skin is warm and dry.   Neurological:      General: No focal deficit present.      Mental Status: She is alert and oriented to person, place, and time.   Psychiatric:         Mood and Affect: Mood normal.           Lab Results   Component Value Date    GLUCOSE 132 (H) 12/13/2022    CALCIUM 9.4 12/13/2022     12/13/2022    K 4.4 12/13/2022    CO2 26 12/13/2022     12/13/2022    BUN 24 12/13/2022    CREATININE 0.69 12/13/2022     Lab Results   Component Value Date    CHOL 147 12/13/2022    CHOL 131 07/29/2022    CHOL 134 04/15/2021     Lab Results   Component Value Date    HDL 42 (L) 12/13/2022    HDL 39 (L) 07/29/2022    HDL 42 (L) 04/15/2021     Lab Results   Component Value Date    LDLCALC 79 12/13/2022    LDLCALC 71 07/29/2022    LDLCALC 71 04/15/2021     Lab Results   Component Value Date    TRIG 166 (H) 12/13/2022    TRIG 131 07/29/2022    TRIG 125 04/15/2021     No results found for: CHOLHDL  Lab Results   Component Value Date    HGBA1C 7.1 (H) 12/13/2022     Lab Results   Component Value Date    MICROALBUR 0.8 12/13/2022     Lab Results   Component Value Date    TSH 3.03 12/15/2020     1. Type 2 diabetes mellitus without complication, without on long term insulin use (CMS/[facility])  Glycemic control above goal: A1c 7.1%.  Recently switched to Victoza.  Dietary indiscretion.    Recommend:  -If BG >150 after 1 week, increase Victoza to 1.2 mg a day with max dose 1.8 mg.  -Continue same dose of Metformin and Acarbose  -discussed importance of better med compliance  -reinforced importance of following ADA diet  -see CDE for nutritional counseling before next visit  -Check BG daily  -lab in 3 months    Discussed with patient  today importance of improving glycemic control in order to prevent complications to eyes, heart, kidneys, and nerves.  Reviewed an A1c goal of less than 7% is ideal in order to prevent the complications as noted above.    Discussed safe glycemic goals and targets:   Fasting B-140.  2 hours after meals:  <180.    I discussed hypoglycemia management and treatment.  Discussed rule of 15.  Always carry candy on hand.  Do not drive car if blood sugar less than 100 or greater than 300.    Discussed importance of yearly dilated eye exam to assess for retinopathy.  Discussed importance of daily foot inspection and follow up with podiatry on routine basis.    2.  Hypertension  BP in office today at goal.  She is on quinapril 20 mg daily.  Management per PCP.     3. Dyslipidemia  LDL level above goal; <70.  Continue simvastatin 20 mg and fish oil 1000 mg po daily.  Work of diet and LSM.  Further management per PCP.     4.  Obesity  BMI 34  Discussed importance of dietary and lifestyle modification in order to promote weight reduction.  She is currently on GLP-1 which will hopefully assist with weight management.        This document was created using Dragon software.  Please excuse any spelling or grammatical errors.

## 2023-01-26 ENCOUNTER — TELEPHONE (OUTPATIENT)
Dept: CARDIOLOGY | Facility: HOSPITAL | Age: 68
End: 2023-01-26
Payer: MEDICARE

## 2023-01-30 ENCOUNTER — HOSPITAL ENCOUNTER (OUTPATIENT)
Dept: CARDIOLOGY | Facility: HOSPITAL | Age: 68
Discharge: HOME | End: 2023-01-30
Attending: INTERNAL MEDICINE
Payer: MEDICARE

## 2023-01-30 VITALS — WEIGHT: 206 LBS | HEIGHT: 61 IN | BODY MASS INDEX: 38.89 KG/M2

## 2023-01-30 VITALS
HEART RATE: 73 BPM | HEIGHT: 61 IN | DIASTOLIC BLOOD PRESSURE: 72 MMHG | WEIGHT: 206 LBS | SYSTOLIC BLOOD PRESSURE: 130 MMHG | BODY MASS INDEX: 38.89 KG/M2

## 2023-01-30 DIAGNOSIS — R09.89 BILATERAL CAROTID BRUITS: ICD-10-CM

## 2023-01-30 LAB
AORTIC ROOT ANNULUS - M-MODE: 3.1 CM
AORTIC VALVE MEAN VELOCITY: 2.19 M/S
AORTIC VALVE VELOCITY TIME INTEGRAL: 69.7 CM
ASCENDING AORTA: 3.7 CM
AV MEAN GRADIENT: 22 MMHG
AV PEAK GRADIENT: 40 MMHG
AV PEAK VELOCITY-S: 3.18 M/S
AV VALVE AREA INDEX: 0.76
AV VALVE AREA: 1.86 CM2
AV VELOCITY RATIO: 0.4
AVA (VTI): 1.52 CM2
BSA FOR ECHO PROCEDURE: 2.01 M2
CUSP SEPARATION: 1.1 CM
DOP CALC LVOT STROKE VOLUME: 106 CM3
E WAVE DECELERATION TIME: 400 MS
E/A RATIO: 0.6
E/E' RATIO: 12.1
E/LAT E' RATIO: 10.1
EDV (BP): 96.1 CM3
EF (A4C): 69.5 %
EF A2C: 73.2 %
EJECTION FRACTION: 69 %
ESV (BP): 29.8 CM3
FRACTIONAL SHORTENING: 46.23 %
HEART RATE: 73 BPM
INTERVENTRICULAR SEPTUM: 1.2 CM
LA ESV (BP): 52.5 CM3
LA ESV INDEX (A2C): 23.63 CM3/M2
LA ESV INDEX (BP): 26.12 CM3/M2
LAAS-AP2: 17.3 CM2
LAAS-AP4: 18.4 CM2
LAD 2D: 5.3 CM
LALD A4C: 4.86 CM
LALD A4C: 5.09 CM
LAV-S: 47.5 CM3
LEFT ATRIUM VOLUME INDEX: 27.66 CM3/M2
LEFT ATRIUM VOLUME: 55.6 CM3
LEFT INTERNAL DIMENSION IN SYSTOLE: 2.57 CM (ref 2.69–4.07)
LEFT VENTRICLE DIASTOLIC VOLUME INDEX: 45.12 CM3/M2
LEFT VENTRICLE DIASTOLIC VOLUME: 90.7 CM3
LEFT VENTRICLE SYSTOLIC VOLUME INDEX: 13.78 CM3/M2
LEFT VENTRICLE SYSTOLIC VOLUME: 27.7 CM3
LEFT VENTRICULAR INTERNAL DIMENSION IN DIASTOLE: 4.78 CM (ref 4.56–6.33)
LEFT VENTRICULAR POSTERIOR WALL IN END DIASTOLE: 1.21 CM (ref 0.6–1.11)
LV DIASTOLIC VOLUME: 102 CM3
LV ESV (APICAL 2 CHAMBER): 27.3 CM3
LVAD-AP2: 31.1 CM2
LVAD-AP4: 30.1 CM2
LVAS-AP2: 13.6 CM2
LVAS-AP4: 13 CM2
LVEDVI(A2C): 50.75 CM3/M2
LVEDVI(BP): 47.81 CM3/M2
LVESVI(A2C): 13.58 CM3/M2
LVESVI(BP): 14.83 CM3/M2
LVLD-AP2: 8.21 CM
LVLD-AP4: 8.27 CM
LVLS-AP2: 6.79 CM
LVLS-AP4: 5.73 CM
LVOT 2D: 2.2 CM
LVOT A: 3.8 CM2
LVOT MG: 4 MMHG
LVOT MV: 0.88 M/S
LVOT PEAK VELOCITY: 1.98 M/S
LVOT PG: 16 MMHG
LVOT STROKE VOLUME INDEX: 52.74 ML/M2
LVOT VTI: 27.9 CM
MLH CV ECHO AVA INDEX VELOCITY RATIO: 0.9
MV E'TISSUE VEL-LAT: 0.05 M/S
MV E'TISSUE VEL-MED: 0.04 M/S
MV PEAK A VEL: 0.85 M/S
MV PEAK E VEL: 0.53 M/S
POSTERIOR WALL: 1.21 CM
RVOT VMAX: 0.64 M/S
RVOT VTI: 11.9 CM
SEPTAL TISSUE DOPPLER FREE WALL LATE DIA VELOCITY (APICAL 4 CHAMBER VIEW): 0.12 M/S
Z-SCORE OF LEFT VENTRICULAR DIMENSION IN END DIASTOLE: -1.17
Z-SCORE OF LEFT VENTRICULAR DIMENSION IN END SYSTOLE: -2
Z-SCORE OF LEFT VENTRICULAR POSTERIOR WALL IN END DIASTOLE: 2.07

## 2023-01-30 PROCEDURE — 93880 EXTRACRANIAL BILAT STUDY: CPT | Mod: 26 | Performed by: SURGERY

## 2023-01-30 PROCEDURE — 93306 TTE W/DOPPLER COMPLETE: CPT | Mod: 26 | Performed by: INTERNAL MEDICINE

## 2023-01-30 PROCEDURE — 93880 EXTRACRANIAL BILAT STUDY: CPT

## 2023-01-30 PROCEDURE — 93306 TTE W/DOPPLER COMPLETE: CPT

## 2023-01-31 NOTE — PROCEDURE: SCREENING FOR COVID-19
Patient requesting refill(s) of HYDROcodone-acetaminophen (NORCO) 7.5-325 MG per tablet  Last dispensed from pharmacy on 12/28/22    fentaNYL (DURAGESIC) 12 mcg/hr 72 hr patch  Last dispensed from pharmacy on 12/28/22    Patient's last office/virtual visit by prescribing provider on 1/10/23  Next office/virtual appointment scheduled for 02/22/23    Last urine drug screen date 8/11/22  Current opioid agreement on file (completed within the last year) Yes Date of opioid agreement: 8/12/22    E-prescribe to [facility] [address] [place] [address], [place]     Will route to nursing [place] for review and preparation of prescription(s).         
Has The Patient Been Infected With Covid-19 In The Past?: No
Previous Infection Text: The patient has recovered from a previous COVID-19 infection.
Detail Level: Simple
Has The Patient Been Vaccinated For Covid-19?: Yes

## 2023-02-02 LAB
BSA FOR ECHO PROCEDURE: 2.01 M2
LEFT CCA DIST DIAS: 19.76 CM/S
LEFT CCA DIST SYS: 76.49 CM/S
LEFT CCA MID DIAS: 16.37 CM/S
LEFT CCA MID SYS: 77.9 CM/S
LEFT CCA PROX DIAS: 13.92 CM/S
LEFT CCA PROX SYS: 83.92 CM/S
LEFT ICA DIST DIAS: 22.96 CM/S
LEFT ICA DIST SYS: 98.98 CM/S
LEFT ICA MID DIAS: 20.6 CM/S
LEFT ICA MID SYS: 60.69 CM/S
LEFT ICA PROX DIAS: 21.08 CM/S
LEFT ICA PROX SYS: 77.15 CM/S
LEFT ICA/CCA SYS: 1.29
LT BULB EDV: 19.48 CM/S
LT BULB PSV: 60.97 CM/S
LT ECA PROX EDV: 15.05 CM/S
LT ECA PROX PSV: 92.2 CM/S
LT VERTEBRAL MID EDV: 17.5 CM/S
LT VERTEBRAL MID PSV: 55.6 CM/S
RIGHT CCA DIST DIAS: 20.6 CM/S
RIGHT CCA DIST SYS: 72.82 CM/S
RIGHT CCA MID DIAS: 21.08 CM/S
RIGHT CCA MID SYS: 94.84 CM/S
RIGHT CCA PROX DIAS: 13.83 CM/S
RIGHT CCA PROX SYS: 62.66 CM/S
RIGHT ECA SYS: 86.56 CM/S
RIGHT ICA DIST DIAS: 39.89 CM/S
RIGHT ICA DIST SYS: 97.85 CM/S
RIGHT ICA MID DIAS: 30.2 CM/S
RIGHT ICA MID SYS: 69.44 CM/S
RIGHT ICA PROX DIAS: 15.58 CM/S
RIGHT ICA PROX SYS: 52.61 CM/S
RIGHT ICA/CCA SYS: 1.34
RT BULB EDV: 16.03 CM/S
RT BULB PSV: 51.94 CM/S
RT ECA PROC EDV: 15.81 CM/S
RT VERTEBRAL MID EDV: 13.92 CM/S
RT VERTEBRAL MID PSV: 42.9 CM/S

## 2023-02-08 ENCOUNTER — OFFICE VISIT (OUTPATIENT)
Dept: CARDIOLOGY | Facility: CLINIC | Age: 68
End: 2023-02-08
Payer: MEDICARE

## 2023-02-08 VITALS
WEIGHT: 205 LBS | SYSTOLIC BLOOD PRESSURE: 122 MMHG | BODY MASS INDEX: 38.71 KG/M2 | RESPIRATION RATE: 14 BRPM | HEIGHT: 61 IN | TEMPERATURE: 98.6 F | OXYGEN SATURATION: 73 % | DIASTOLIC BLOOD PRESSURE: 68 MMHG | HEART RATE: 73 BPM

## 2023-02-08 DIAGNOSIS — I10 ESSENTIAL HYPERTENSION: Primary | ICD-10-CM

## 2023-02-08 PROCEDURE — G8752 SYS BP LESS 140: HCPCS | Performed by: INTERNAL MEDICINE

## 2023-02-08 PROCEDURE — 99214 OFFICE O/P EST MOD 30 MIN: CPT | Performed by: INTERNAL MEDICINE

## 2023-02-08 PROCEDURE — 93000 ELECTROCARDIOGRAM COMPLETE: CPT | Performed by: INTERNAL MEDICINE

## 2023-02-08 PROCEDURE — G8754 DIAS BP LESS 90: HCPCS | Performed by: INTERNAL MEDICINE

## 2023-02-08 ASSESSMENT — ENCOUNTER SYMPTOMS
DYSPNEA ON EXERTION: 1
DEPRESSION: 1
HEMATOLOGIC/LYMPHATIC NEGATIVE: 1
CONSTITUTIONAL NEGATIVE: 1
RESPIRATORY NEGATIVE: 1
PARESTHESIAS: 1
EYES NEGATIVE: 1
ORTHOPNEA: 1
GASTROINTESTINAL NEGATIVE: 1
NERVOUS/ANXIOUS: 1

## 2023-02-08 NOTE — LETTER
February 8, 2023     Billy Antunez DO  [address] [facility] [address]    Patient: Citlali Gifford  YOB: 1955  Date of Visit: 2/8/2023      Dear Dr. Antunez:    Thank you for referring Citlali Gifford to me for evaluation. Below are my notes for this consultation.    If you have questions, please do not hesitate to call me. I look forward to following your patient along with you.         Sincerely,        Lupillo Cooley DO        CC: No Recipients  Lupillo Cooley DO  2/8/2023  8:23 AM  Signed      Chief Complaint: I saw Kailyn in the office today on a routine visit for her hypertension aortic stenosis diabetes and hyperlipidemia.  She is stable we went over the results of her echocardiogram over the phone last week.  She has mild aortic stenosis with mild mitral regurgitation.  She denies chest discomfort or shortness of breath with exertion, anxiety, cold weather, postprandially, with sex, at rest, or nocturnally.  She denies exertional dyspnea, paroxysmal nocturnal dyspnea, orthopnea, palpitations, syncope, ankle edema, she has nocturia.       Medications:  Current Outpatient Medications   Medication Sig Dispense Refill   • acarbose (PRECOSE) 100 mg tablet Take 1 tab oral 3 x daily at start of each meal 3 tabs daily 270 tablet 3   • b complex vitamins tablet No SIG Entered     • blood sugar diagnostic (ONETOUCH VERIO TEST STRIPS) strip Check glucose 3 times a  day 200 strip 3   • blood-glucose meter (ONETOUCH VERIO FLEX METER) misc CHECK GLUCOSE 3 TIMES ADAY 300 each 3   • cholecalciferol, vitamin D3, 25 mcg (1,000 unit) capsule One tablet daily     • estradioL (ESTRACE) 0.01 % (0.1 mg/gram) vaginal cream Apply nightly to  vagina for 1 week, then Monday/Wednesday/ Friday 42.5 g 5   • folic acid (FOLVITE) 1 mg tablet Take 1 mg by mouth daily.     • lancets (MICROLET LANCET) misc CHECK BLOOD GLUCOSE 4 TIMES A  each 6   • lancets (ONETOUCH DELICA LANCETS) 33 gauge misc  "Check glucose 3 times aday 200 each 3   • metFORMIN (GLUCOPHAGE) 500 mg tablet Take 2 tablets (1,000 mg total) by mouth 2 (two) times a day with meals. 360 tablet 3   • omega-3 fatty acids-fish oil 300-1,000 mg capsule take 1 capsule daily     • omeprazole (PriLOSEC) 20 mg capsule Take 20 mg by mouth daily before breakfast.     • pen needle, diabetic 32 gauge x 1/4\" needle Use for daily injection 100 each 3   • quinapriL (ACCUPRIL) 20 mg tablet Take 20 mg by mouth daily.   180 tablet 3   • resvera/chrom/gr.tea/EGCG/dig3 (RESVERATROL DIET ORAL) Take by mouth.     • sertraline (ZOLOFT) 100 mg tablet Take 100 mg by mouth every evening.  2   • simvastatin (ZOCOR) 20 mg tablet Take 1 tablet (20 mg total) by mouth nightly. 90 tablet 3   • zinc sulfate (ZINCATE) 220 (50) mg capsule Take 220 mg by mouth daily.     • azelastine (ASTELIN) 137 mcg (0.1 %) nasal spray Administer 1 spray into each nostril daily. Use in each nostril as directed.     • IMVEXXY MAINTENANCE PACK 10 mcg insert INSERT 10 MCG INTO THE VAGINA 2 (TWO) TIMES A WEEK (MON, THU). 8 each 0   • liraglutide (VICTOZA 3-AZAM) 0.6 mg/0.1 mL (18 mg/3 mL) injection Inject 0.1 mL (0.6 mg total) under the skin daily. 9 mL 3     No current facility-administered medications for this visit.       Review of Systems:  Review of Systems   Constitutional: Negative.   HENT: Positive for hearing loss.    Eyes: Negative.    Cardiovascular: Positive for dyspnea on exertion and orthopnea.   Respiratory: Negative.    Endocrine: Positive for heat intolerance.   Hematologic/Lymphatic: Negative.    Skin: Negative.    Musculoskeletal: Positive for joint pain.   Gastrointestinal: Negative.    Genitourinary: Positive for nocturia.   Neurological: Positive for paresthesias.   Psychiatric/Behavioral: Positive for depression. The patient is nervous/anxious.    Allergic/Immunologic: Positive for environmental allergies.       Physical Exam:  Vitals:    02/08/23 0736   BP: 122/68   Pulse: 73 "   Resp: 14   Temp: 37 °C (98.6 °F)   SpO2: (!) 73%     Physical Exam  Constitutional:       Appearance: She is well-developed and well-nourished. She is obese.   HENT:      Head: Normocephalic and atraumatic.   Eyes:      Extraocular Movements: EOM normal.      Conjunctiva/sclera: Conjunctivae normal.      Pupils: Pupils are equal, round, and reactive to light.   Neck:      Vascular: Carotid bruit (Bilateral carotid) present.   Cardiovascular:      Rate and Rhythm: Normal rate and regular rhythm.      Pulses: Intact distal pulses.      Heart sounds: Murmur (S4 MR AS) heard.     Gallop present.   Pulmonary:      Effort: Pulmonary effort is normal.      Breath sounds: Normal breath sounds.   Abdominal:      General: Bowel sounds are normal.      Palpations: Abdomen is soft.   Musculoskeletal:         General: Normal range of motion.      Cervical back: Normal range of motion and neck supple.   Skin:     General: Skin is warm, dry and intact.   Neurological:      Mental Status: She is alert and oriented to person, place, and time.      Motor: Motor strength is normal.      Deep Tendon Reflexes: Reflexes are normal and symmetric.   Psychiatric:         Mood and Affect: Mood and affect normal.         Speech: Speech normal.         Behavior: Behavior normal.         Thought Content: Thought content normal.         Cognition and Memory: Cognition and memory normal.         Judgment: Judgment normal.       EKG: SR Twave abn    Assessment and Plan:  Impression:  Aortic stenosis mild.  Mitral regurgitation mild.  Hypertension controlled.  Diabetes mellitus type 2.  Obesity.  Recommendation: She appears to be hemodynamically stable I did not have to make any changes in her medications I did  her on diet and exercise and diet the progression of her aortic stenosis is basically under her control.  I told her if she controls her risk factors she slows down the progression of the aortic stenosis.  We are going to transfer  her care to Dr. Alvaro Cooley at [facility] because it is closer to her house.  Thank you for your support through the years.    Lupillo Cooley, DO

## 2023-02-08 NOTE — PROGRESS NOTES
"    Chief Complaint: I saw Kailyn in the office today on a routine visit for her hypertension aortic stenosis diabetes and hyperlipidemia.  She is stable we went over the results of her echocardiogram over the phone last week.  She has mild aortic stenosis with mild mitral regurgitation.  She denies chest discomfort or shortness of breath with exertion, anxiety, cold weather, postprandially, with sex, at rest, or nocturnally.  She denies exertional dyspnea, paroxysmal nocturnal dyspnea, orthopnea, palpitations, syncope, ankle edema, she has nocturia.       Medications:  Current Outpatient Medications   Medication Sig Dispense Refill   • acarbose (PRECOSE) 100 mg tablet Take 1 tab oral 3 x daily at start of each meal 3 tabs daily 270 tablet 3   • b complex vitamins tablet No SIG Entered     • blood sugar diagnostic (ONETOUCH VERIO TEST STRIPS) strip Check glucose 3 times a  day 200 strip 3   • blood-glucose meter (ONETOUCH VERIO FLEX METER) misc CHECK GLUCOSE 3 TIMES ADAY 300 each 3   • cholecalciferol, vitamin D3, 25 mcg (1,000 unit) capsule One tablet daily     • estradioL (ESTRACE) 0.01 % (0.1 mg/gram) vaginal cream Apply nightly to  vagina for 1 week, then Monday/Wednesday/ Friday 42.5 g 5   • folic acid (FOLVITE) 1 mg tablet Take 1 mg by mouth daily.     • lancets (MICROLET LANCET) misc CHECK BLOOD GLUCOSE 4 TIMES A  each 6   • lancets (ONETOUCH DELICA LANCETS) 33 gauge misc Check glucose 3 times aday 200 each 3   • metFORMIN (GLUCOPHAGE) 500 mg tablet Take 2 tablets (1,000 mg total) by mouth 2 (two) times a day with meals. 360 tablet 3   • omega-3 fatty acids-fish oil 300-1,000 mg capsule take 1 capsule daily     • omeprazole (PriLOSEC) 20 mg capsule Take 20 mg by mouth daily before breakfast.     • pen needle, diabetic 32 gauge x 1/4\" needle Use for daily injection 100 each 3   • quinapriL (ACCUPRIL) 20 mg tablet Take 20 mg by mouth daily.   180 tablet 3   • resvera/chrom/gr.tea/EGCG/dig3 (RESVERATROL " DIET ORAL) Take by mouth.     • sertraline (ZOLOFT) 100 mg tablet Take 100 mg by mouth every evening.  2   • simvastatin (ZOCOR) 20 mg tablet Take 1 tablet (20 mg total) by mouth nightly. 90 tablet 3   • zinc sulfate (ZINCATE) 220 (50) mg capsule Take 220 mg by mouth daily.     • azelastine (ASTELIN) 137 mcg (0.1 %) nasal spray Administer 1 spray into each nostril daily. Use in each nostril as directed.     • IMVEXXY MAINTENANCE PACK 10 mcg insert INSERT 10 MCG INTO THE VAGINA 2 (TWO) TIMES A WEEK (MON, THU). 8 each 0   • liraglutide (VICTOZA 3-AZAM) 0.6 mg/0.1 mL (18 mg/3 mL) injection Inject 0.1 mL (0.6 mg total) under the skin daily. 9 mL 3     No current facility-administered medications for this visit.       Review of Systems:  Review of Systems   Constitutional: Negative.   HENT: Positive for hearing loss.    Eyes: Negative.    Cardiovascular: Positive for dyspnea on exertion and orthopnea.   Respiratory: Negative.    Endocrine: Positive for heat intolerance.   Hematologic/Lymphatic: Negative.    Skin: Negative.    Musculoskeletal: Positive for joint pain.   Gastrointestinal: Negative.    Genitourinary: Positive for nocturia.   Neurological: Positive for paresthesias.   Psychiatric/Behavioral: Positive for depression. The patient is nervous/anxious.    Allergic/Immunologic: Positive for environmental allergies.       Physical Exam:  Vitals:    02/08/23 0736   BP: 122/68   Pulse: 73   Resp: 14   Temp: 37 °C (98.6 °F)   SpO2: (!) 73%     Physical Exam  Constitutional:       Appearance: She is well-developed and well-nourished. She is obese.   HENT:      Head: Normocephalic and atraumatic.   Eyes:      Extraocular Movements: EOM normal.      Conjunctiva/sclera: Conjunctivae normal.      Pupils: Pupils are equal, round, and reactive to light.   Neck:      Vascular: Carotid bruit (Bilateral carotid) present.   Cardiovascular:      Rate and Rhythm: Normal rate and regular rhythm.      Pulses: Intact distal pulses.       Heart sounds: Murmur (S4 MR AS) heard.     Gallop present.   Pulmonary:      Effort: Pulmonary effort is normal.      Breath sounds: Normal breath sounds.   Abdominal:      General: Bowel sounds are normal.      Palpations: Abdomen is soft.   Musculoskeletal:         General: Normal range of motion.      Cervical back: Normal range of motion and neck supple.   Skin:     General: Skin is warm, dry and intact.   Neurological:      Mental Status: She is alert and oriented to person, place, and time.      Motor: Motor strength is normal.      Deep Tendon Reflexes: Reflexes are normal and symmetric.   Psychiatric:         Mood and Affect: Mood and affect normal.         Speech: Speech normal.         Behavior: Behavior normal.         Thought Content: Thought content normal.         Cognition and Memory: Cognition and memory normal.         Judgment: Judgment normal.       EKG: SR Twave abn    Assessment and Plan:  Impression:  Aortic stenosis mild.  Mitral regurgitation mild.  Hypertension controlled.  Diabetes mellitus type 2.  Obesity.  Recommendation: She appears to be hemodynamically stable I did not have to make any changes in her medications I did  her on diet and exercise and diet the progression of her aortic stenosis is basically under her control.  I told her if she controls her risk factors she slows down the progression of the aortic stenosis.  We are going to transfer her care to Dr. Alvaro Cooley at [facility] because it is closer to her house.  Thank you for your support through the years.    Lupillo Cooley,

## 2023-03-03 ENCOUNTER — TELEPHONE (OUTPATIENT)
Dept: ENDOCRINOLOGY | Facility: CLINIC | Age: 68
End: 2023-03-03
Payer: MEDICARE

## 2023-03-03 NOTE — TELEPHONE ENCOUNTER
Patient called in requesting the Dexcom G7 and in EPIC is not set up to send the request in, a hand written script needs to be submitted in order for us to fax to the supplier. Patient has Medicare    Please advise

## 2023-03-10 DIAGNOSIS — E11.9 TYPE 2 DIABETES MELLITUS WITHOUT COMPLICATION, WITHOUT LONG-TERM CURRENT USE OF INSULIN (CMS/HCC): ICD-10-CM

## 2023-03-10 RX ORDER — METFORMIN HYDROCHLORIDE 500 MG/1
TABLET ORAL
Qty: 360 TABLET | Refills: 3 | Status: SHIPPED | OUTPATIENT
Start: 2023-03-10 | End: 2024-02-13 | Stop reason: SDUPTHER

## 2023-03-10 NOTE — TELEPHONE ENCOUNTER
Last Office Visit: 12/15/2022  Last Telemedicine Visit: 4/9/2020 Ana Vazquez MD    Next Office Visit: 3/22/2023  Next Telemedicine Visit: Visit date not found     MTM pended

## 2023-03-14 ENCOUNTER — TRANSCRIBE ORDERS (OUTPATIENT)
Dept: SCHEDULING | Age: 68
End: 2023-03-14

## 2023-03-14 DIAGNOSIS — K75.81 NONALCOHOLIC STEATOHEPATITIS (NASH): Primary | ICD-10-CM

## 2023-03-14 LAB
BUN SERPL-MCNC: 23 MG/DL (ref 7–25)
BUN/CREAT SERPL: ABNORMAL (CALC) (ref 6–22)
CALCIUM SERPL-MCNC: 9.2 MG/DL (ref 8.6–10.4)
CHLORIDE SERPL-SCNC: 104 MMOL/L (ref 98–110)
CHOLEST SERPL-MCNC: 135 MG/DL
CHOLEST/HDLC SERPL: 3.6 (CALC)
CO2 SERPL-SCNC: 26 MMOL/L (ref 20–32)
CREAT SERPL-MCNC: 0.73 MG/DL (ref 0.5–1.05)
EGFRCR SERPLBLD CKD-EPI 2021: 91 ML/MIN/1.73M2
GLUCOSE SERPL-MCNC: 125 MG/DL (ref 65–99)
HBA1C MFR BLD: 6.8 % OF TOTAL HGB
HDLC SERPL-MCNC: 37 MG/DL
LDLC SERPL CALC-MCNC: 76 MG/DL (CALC)
NONHDLC SERPL-MCNC: 98 MG/DL (CALC)
POTASSIUM SERPL-SCNC: 4.3 MMOL/L (ref 3.5–5.3)
SODIUM SERPL-SCNC: 137 MMOL/L (ref 135–146)
TRIGL SERPL-MCNC: 135 MG/DL

## 2023-03-17 RX ORDER — LIRAGLUTIDE 6 MG/ML
1.2 INJECTION SUBCUTANEOUS DAILY
Qty: 18 ML | Refills: 1 | Status: SHIPPED | OUTPATIENT
Start: 2023-03-17 | End: 2023-10-06 | Stop reason: SDUPTHER

## 2023-03-17 NOTE — TELEPHONE ENCOUNTER
Pt stated that pt could double her Victoza if she was ok with the 06mg. Therefore,  pt has doubled her injection to 1.2mg and already has gone thru her prescription. Need to reorder for 1.2mg VictozaLast Office Visit: 12/15/2022  Last Telemedicine Visit: 4/9/2020 Ana Vazquez MD    Next Office Visit: 3/23/2023  Next Telemedicine Visit: Visit date not found   LBW 3/14/2023  Victoza

## 2023-03-23 ENCOUNTER — OFFICE VISIT (OUTPATIENT)
Dept: ENDOCRINOLOGY | Facility: CLINIC | Age: 68
End: 2023-03-23
Payer: MEDICARE

## 2023-03-23 VITALS
BODY MASS INDEX: 38.89 KG/M2 | SYSTOLIC BLOOD PRESSURE: 122 MMHG | WEIGHT: 206 LBS | HEIGHT: 61 IN | HEART RATE: 74 BPM | OXYGEN SATURATION: 100 % | DIASTOLIC BLOOD PRESSURE: 74 MMHG

## 2023-03-23 DIAGNOSIS — E11.9 TYPE 2 DIABETES MELLITUS WITHOUT COMPLICATION, UNSPECIFIED WHETHER LONG TERM INSULIN USE (CMS/HCC): Primary | ICD-10-CM

## 2023-03-23 LAB
EXPIRATION DATE: NORMAL
GLUCOSE BLOOD, POC: 148
Lab: NORMAL
POCT MANUFACTURER: NORMAL

## 2023-03-23 PROCEDURE — 82962 GLUCOSE BLOOD TEST: CPT | Performed by: NURSE PRACTITIONER

## 2023-03-23 PROCEDURE — G8754 DIAS BP LESS 90: HCPCS | Performed by: NURSE PRACTITIONER

## 2023-03-23 PROCEDURE — 99214 OFFICE O/P EST MOD 30 MIN: CPT | Performed by: NURSE PRACTITIONER

## 2023-03-23 PROCEDURE — G8752 SYS BP LESS 140: HCPCS | Performed by: NURSE PRACTITIONER

## 2023-03-23 RX ORDER — LISINOPRIL 20 MG/1
20 TABLET ORAL DAILY
COMMUNITY
Start: 2023-02-16 | End: 2024-06-06

## 2023-03-23 ASSESSMENT — ENCOUNTER SYMPTOMS
MUSCULOSKELETAL NEGATIVE: 1
CARDIOVASCULAR NEGATIVE: 1
NEUROLOGICAL NEGATIVE: 1
GASTROINTESTINAL NEGATIVE: 1
PSYCHIATRIC NEGATIVE: 1
EYES NEGATIVE: 1
CONSTITUTIONAL NEGATIVE: 1
HEMATOLOGIC/LYMPHATIC NEGATIVE: 1
RESPIRATORY NEGATIVE: 1
ENDOCRINE NEGATIVE: 1

## 2023-03-23 NOTE — PROGRESS NOTES
Kailyn is a 67 y.o. female here for follow up for diabetes management.     LOV with me 12/15/2022     Events since last visit:  Reports she needs to have US of liver next week     DM2 med regimen:  Metformin 1000 mg p.o. twice daily  Acarbose 100 mg p.o. 3 times a day with meals  Victoxa 1.8 mg once daily    Missing dinner dose of acarbose sometimes      Previous meds not currently taking:  Trulicity is too expensive  Jardiance- UTI     SMBG:   BG logs reviewed today.  Fastin, 153     Diet:   Meals: 3 meals a day  Soup and salad for lunch  Sometimes eats a snack at night  Craves sweets     Exercise:   10-minute walk in the morning with her dog up a hill.    Trying to lift wts 3 times a day at home  Babysit grandkids during the day     Ophthalmology:   LOV 2022  NOV in 2023  Dr. Wallace  Retinopathy: None reported     Podiatry:   Self checks  Neuropathy: None  Sees podiatry every 3 months  Dr. Ware    Wt Readings from Last 3 Encounters:   23 93.4 kg (206 lb)   23 93 kg (205 lb)   23 93.4 kg (206 lb)     Temp Readings from Last 3 Encounters:   23 37 °C (98.6 °F)   22 37 °C (98.6 °F)   22 37 °C (98.6 °F)     BP Readings from Last 3 Encounters:   23 122/74   23 122/68   23 130/72     Pulse Readings from Last 3 Encounters:   23 74   23 73   23 73     Review of Systems   Constitutional: Negative.    HENT: Negative.    Eyes: Negative.    Respiratory: Negative.    Cardiovascular: Negative.    Gastrointestinal: Negative.    Endocrine: Negative.    Genitourinary: Negative.    Musculoskeletal: Negative.    Skin: Negative.    Neurological: Negative.    Hematological: Negative.    Psychiatric/Behavioral: Negative.      Physical Exam  Vitals reviewed.   Constitutional:       Appearance: Normal appearance. She is obese.   Neck:      Comments: No thyromegaly  Cardiovascular:      Rate and Rhythm: Normal rate and regular rhythm.      Heart sounds:  Murmur heard.   Pulmonary:      Effort: Pulmonary effort is normal.      Breath sounds: Normal breath sounds.   Abdominal:      General: Bowel sounds are normal.      Palpations: Abdomen is soft.   Musculoskeletal:         General: Normal range of motion.      Cervical back: Neck supple.   Skin:     General: Skin is warm and dry.   Neurological:      General: No focal deficit present.      Mental Status: She is alert and oriented to person, place, and time.   Psychiatric:         Mood and Affect: Mood normal.         Lab Results   Component Value Date    GLUCOSE 125 (H) 03/13/2023    CALCIUM 9.2 03/13/2023     03/13/2023    K 4.3 03/13/2023    CO2 26 03/13/2023     03/13/2023    BUN 23 03/13/2023    CREATININE 0.73 03/13/2023     Lab Results   Component Value Date    CHOL 135 03/13/2023    CHOL 147 12/13/2022    CHOL 131 07/29/2022     Lab Results   Component Value Date    HDL 37 (L) 03/13/2023    HDL 42 (L) 12/13/2022    HDL 39 (L) 07/29/2022     Lab Results   Component Value Date    LDLCALC 76 03/13/2023    LDLCALC 79 12/13/2022    LDLCALC 71 07/29/2022     Lab Results   Component Value Date    TRIG 135 03/13/2023    TRIG 166 (H) 12/13/2022    TRIG 131 07/29/2022     No results found for: CHOLHDL  Lab Results   Component Value Date    HGBA1C 6.8 (H) 03/13/2023     Lab Results   Component Value Date    MICROALBUR 0.8 12/13/2022     Lab Results   Component Value Date    TSH 3.03 12/15/2020     1. Type 2 diabetes mellitus without complication, without on long term insulin use (CMS/[facility])  Glycemic control at goal: A1c 6.8%.  Tolerating Victoza.    Recommend:  -cont Victoza 1.8 m g once day and same dose of Metformin and Acarbose  -see CDE for nutritional counseling before next visit  -Check BG daily  -lab in 3 months     Discussed with patient today importance of improving glycemic control in order to prevent complications to eyes, heart, kidneys, and nerves.  Reviewed an A1c goal of less than 7% is ideal  in order to prevent the complications as noted above.     Discussed safe glycemic goals and targets:   Fasting B-140.  2 hours after meals:  <180.     I discussed hypoglycemia management and treatment.  Discussed rule of 15.  Always carry candy on hand.  Do not drive car if blood sugar less than 100 or greater than 300.     Discussed importance of yearly dilated eye exam to assess for retinopathy.  Discussed importance of daily foot inspection and follow up with podiatry on routine basis.     2.  Hypertension  BP in office today at goal.  She is on quinapril 20 mg daily.  Management per PCP.     3. Dyslipidemia  LDL level above goal; <70.  Continue simvastatin 20 mg and fish oil 1000 mg po daily.  Further management per PCP.     4.  Obesity  BMI 38  Reviewed with patient today importance of following a balanced diet, monitoring carbohydrate portions, and avoiding sweetened snacks and beverages.    Encouraged to see CDE for additional nutrition counseling.    Reviewed with patient importance of lifestyle modification, including increasing frequency of exercise to aid in improving glycemic control.  Recommend 150 minutes of aerobic exercise a week.  She is tolerating GLP-1.        This document was created using Dragon software.  Please excuse any spelling or grammatical errors.

## 2023-03-27 ENCOUNTER — HOSPITAL ENCOUNTER (OUTPATIENT)
Dept: RADIOLOGY | Age: 68
Discharge: HOME | End: 2023-03-27
Attending: STUDENT IN AN ORGANIZED HEALTH CARE EDUCATION/TRAINING PROGRAM
Payer: MEDICARE

## 2023-03-27 DIAGNOSIS — K75.81 NONALCOHOLIC STEATOHEPATITIS (NASH): ICD-10-CM

## 2023-03-27 PROCEDURE — 76981 USE PARENCHYMA: CPT | Mod: 59

## 2023-03-28 ENCOUNTER — TELEPHONE (OUTPATIENT)
Dept: SURGERY | Facility: CLINIC | Age: 68
End: 2023-03-28
Payer: MEDICARE

## 2023-03-31 NOTE — TELEPHONE ENCOUNTER
I spoke with patient regarding colonoscopy screening which patient states unable to do today. I informed patient that will receive a callback beginning of next week and patient is agreeable.

## 2023-04-04 ENCOUNTER — DOCUMENTATION (OUTPATIENT)
Dept: SURGERY | Facility: CLINIC | Age: 68
End: 2023-04-04
Payer: MEDICARE

## 2023-04-04 NOTE — PROGRESS NOTES
" Questions YES NO   1 Do you have a personal history of Inflammatory Bowel Disease, such as ulcerative colitis or Crohn’s disease?  x    If no, proceed to question #2.    If Yes Stop the assessment and advise the patient to see their gastroenterologist.   2 Do you have a Personal history of colon cancer or rectal cancer?  x    If not, proceed to question 3.    If yes, were you managed by Dr. Torbiio?      If patient were managed by Dr. Toribio go to question 3.    If patient were not managed by Dr Toribio,please make an appointment with Dr. Toribio.    Who did you see for treatment?    3 Do you have any of the following symptoms?    Change in bowel habits  x    Bleeding with bowel movements or rectal bleeding*  x    Anal Pain*  x    Hemorrhoids  x    Narrow Stools  x    Fatigue  x    Weight Loss  x    Heart Attack or Stoke within 6 months  x    Defibrillator or pacemaker implanted  x    Blood Thinner medications (ie, Coumadin, Warfarin, Plavix, Xarelto, Aspirin)  x    Currently on home oxygen  x    Bleeding Disorder (ie Anemia)  x    Decreased appetite  x    Rectal or abdominal pan  x    Morbid Obesity (HT/WT)  5'1\" 204lbs    End-Stage Liver Disease/cirrhosis  X fatty liver    Pregnancy  x    CAD, heart disease, valvular disease, or angina X aortic stenosis     If less than 3 symptoms, proceed to question 4.    If more than 3 symptoms, schedule an appointment with Dr. Toribio.    Patient needs appointment for GI symptoms such as anal bleeding or rectal pain.    4 Have you had a colonoscopy in the last 10 years? 2018     If no, proceed to question 5.    If yes, when were you recommended to have a follow up colonoscopy? 5 Years    If this colonoscopy is on or after the recommended time interval, please proceed to question 5.    If this colonoscopy is before the recommended time interval, please advise the patient to make an appointment to with Dr. Toribio then proceed to question 5.   5 Do you have a personal history of colon or " rectal polyps? x    6 Do you have a family history of colon cancer or rectal cancer? x    7 Do you have a family history of any other cancers?  x    Confirm Pharmacy to send prep to.  Please ensure that the patients' medication, allergies, medical, surgical, and family histories are reviewed.  Once complete, route the documentation encounter to Dr. Toribio for review.     DAC DOCUMENTATION DONE VIA PHONE: REVIEWED THE FOLLOWING WITH THE PATIENT:    Allergies   Allergen Reactions   • Amoxicillin-Pot Clavulanate Rash     Other reaction(s): Unknown   • Sulfa (Sulfonamide Antibiotics) Rash     Other reaction(s): Unknown   • Wellbutrin [Bupropion Hcl] Rash     Other reaction(s): rash           Current Outpatient Medications:   •  acarbose (PRECOSE) 100 mg tablet, Take 1 tab oral 3 x daily at start of each meal 3 tabs daily, Disp: 270 tablet, Rfl: 3  •  b complex vitamins tablet, daily., Disp: , Rfl:   •  blood sugar diagnostic (ONETOUCH VERIO TEST STRIPS) strip, Check glucose 3 times a  day, Disp: 200 strip, Rfl: 3  •  blood-glucose meter (ONETOUCH VERIO FLEX METER) misc, CHECK GLUCOSE 3 TIMES ADAY, Disp: 300 each, Rfl: 3  •  cholecalciferol, vitamin D3, 25 mcg (1,000 unit) capsule, One tablet daily, Disp: , Rfl:   •  estradioL (ESTRACE) 0.01 % (0.1 mg/gram) vaginal cream, Apply nightly to  vagina for 1 week, then Monday/Wednesday/ Friday, Disp: 42.5 g, Rfl: 5  •  folic acid (FOLVITE) 1 mg tablet, Take 1 mg by mouth daily., Disp: , Rfl:   •  lancets (MICROLET LANCET) misc, CHECK BLOOD GLUCOSE 4 TIMES A DAY, Disp: 200 each, Rfl: 6  •  lancets (ONETOUCH DELICA LANCETS) 33 gauge misc, Check glucose 3 times aday, Disp: 200 each, Rfl: 3  •  liraglutide (VICTOZA 3-AZAM) 0.6 mg/0.1 mL (18 mg/3 mL) injection, Inject 0.2 mL (1.2 mg total) under the skin daily., Disp: 18 mL, Rfl: 1  •  lisinopriL (PRINIVIL) 20 mg tablet, Take 20 mg by mouth daily., Disp: , Rfl:   •  metFORMIN (GLUCOPHAGE) 500 mg tablet, TAKE 2 TABLETS BY MOUTH TWICE  "A DAY WITH FOOD, Disp: 360 tablet, Rfl: 3  •  omega-3 fatty acids-fish oil 300-1,000 mg capsule, take 1 capsule daily, Disp: , Rfl:   •  omeprazole (PriLOSEC) 20 mg capsule, Take 20 mg by mouth daily before breakfast., Disp: , Rfl:   •  pen needle, diabetic 32 gauge x 1/4\" needle, Use for daily injection, Disp: 100 each, Rfl: 3  •  resvera/chrom/gr.tea/EGCG/dig3 (RESVERATROL DIET ORAL), Take by mouth., Disp: , Rfl:   •  sertraline (ZOLOFT) 100 mg tablet, Take 100 mg by mouth every evening., Disp: , Rfl: 2  •  simvastatin (ZOCOR) 20 mg tablet, Take 1 tablet (20 mg total) by mouth nightly., Disp: 90 tablet, Rfl: 3  •  zinc sulfate (ZINCATE) 220 (50) mg capsule, Take 220 mg by mouth daily., Disp: , Rfl:   •  quinapriL (ACCUPRIL) 20 mg tablet, Take 20 mg by mouth daily.  , Disp: 180 tablet, Rfl: 3      Past Medical History:   Diagnosis Date   • Anxiety    • Aortic stenosis    • Claustrophobia    • Elevated liver enzymes     dx-  fatty liver   • Hypertension    • Impaired hearing     bilateral - no hearing aids   • Lipid disorder    • Numbness     left foot- secondary to previous  back injury    • Postmenopausal    • Sleep apnea     compliant with CPAP- nightly   • Tendonitis     left shoulder - treatment with PT   • Type 2 diabetes mellitus (CMS/HCC)    • Vertigo     ongoing- intermittent with position changes- treatment with meclizine and nasal spray       Family History   Problem Relation Age of Onset   • Hypertension Biological Mother    • Diabetes Biological Mother    • Heart attack Biological Father    • No Known Problems Biological Sister    • Hypertension Biological Brother    • Diabetes Maternal Grandmother    • Colon cancer Paternal Grandfather    • No Known Problems Biological Daughter    • Hyperlipidemia Biological Daughter    • No Known Problems Biological Son        Past Surgical History:   Procedure Laterality Date   •  SECTION     • CHOLECYSTECTOMY     • DILATION AND EVACUATION     • ENDOMETRIAL " ABLATION     • GALLBLADDER SURGERY     • HERNIA REPAIR     • HYSTERECTOMY     • KNEE ARTHROSCOPY Right     repair torn meniscus   • TONSILLECTOMY         Social History     Socioeconomic History   • Marital status:      Spouse name: Not on file   • Number of children: Not on file   • Years of education: Not on file   • Highest education level: Not on file   Occupational History   • Not on file   Tobacco Use   • Smoking status: Never   • Smokeless tobacco: Never   Vaping Use   • Vaping status: Never Used   Substance and Sexual Activity   • Alcohol use: Not Currently     Comment: ocasional   • Drug use: Never   • Sexual activity: Defer     Partners: Male     Birth control/protection: None     Comment:    Other Topics Concern   • Not on file   Social History Narrative   • Not on file     Social Determinants of Health     Financial Resource Strain: Not on file   Food Insecurity: Not on file   Transportation Needs: Not on file   Physical Activity: Not on file   Stress: Not on file   Social Connections: Not on file   Intimate Partner Violence: Not on file   Housing Stability: Not on file       This documentation will be routed to the provider for review.

## 2023-04-05 ENCOUNTER — TELEPHONE (OUTPATIENT)
Dept: SURGERY | Facility: CLINIC | Age: 68
End: 2023-04-05
Payer: MEDICARE

## 2023-04-05 ENCOUNTER — PREP FOR CASE (OUTPATIENT)
Dept: SURGERY | Facility: CLINIC | Age: 68
End: 2023-04-05
Payer: MEDICARE

## 2023-04-05 DIAGNOSIS — Z86.0101 HISTORY OF ADENOMATOUS POLYP OF COLON: Primary | ICD-10-CM

## 2023-04-05 RX ORDER — SODIUM CHLORIDE 9 MG/ML
INJECTION, SOLUTION INTRAVENOUS CONTINUOUS
Status: CANCELLED | OUTPATIENT
Start: 2023-04-05 | End: 2023-04-06

## 2023-04-05 NOTE — TELEPHONE ENCOUNTER
As per Dr. Toribio regarding BRITTANIE Screening, no pre colonoscopy office visit needed. Please assist patient with scheduling colonoscopy date/time and prep.Thank you.

## 2023-04-05 NOTE — TELEPHONE ENCOUNTER
I reviewed the BRITTANIE.  She is appropriate for open access colonoscopy scheduling and the orders were placed.

## 2023-04-06 ENCOUNTER — TELEPHONE (OUTPATIENT)
Dept: SURGERY | Facility: CLINIC | Age: 68
End: 2023-04-06
Payer: MEDICARE

## 2023-05-03 DIAGNOSIS — E11.9 TYPE 2 DIABETES MELLITUS WITHOUT COMPLICATION, UNSPECIFIED WHETHER LONG TERM INSULIN USE (CMS/HCC): ICD-10-CM

## 2023-05-04 RX ORDER — BLOOD-GLUCOSE METER
EACH MISCELLANEOUS
Qty: 100 STRIP | Refills: 7 | Status: SHIPPED | OUTPATIENT
Start: 2023-05-04

## 2023-05-04 NOTE — TELEPHONE ENCOUNTER
Last Office Visit: 3/23/2023  Last Telemedicine Visit: 4/9/2020 Ana Vazquez MD    Next Office Visit: 6/28/2023  Next Telemedicine Visit: Visit date not found     Strips pended

## 2023-07-17 ENCOUNTER — TELEPHONE (OUTPATIENT)
Dept: ENDOCRINOLOGY | Facility: CLINIC | Age: 68
End: 2023-07-17
Payer: MEDICARE

## 2023-07-17 DIAGNOSIS — E11.9 TYPE 2 DIABETES MELLITUS WITHOUT COMPLICATION, UNSPECIFIED WHETHER LONG TERM INSULIN USE (CMS/HCC): ICD-10-CM

## 2023-07-17 NOTE — TELEPHONE ENCOUNTER
Patient left a message in the answering machine requesting refill on Lancets to check blood sugars.    Please advise

## 2023-07-18 RX ORDER — LANCETS 33 GAUGE
EACH MISCELLANEOUS
Qty: 200 EACH | Refills: 3 | Status: SHIPPED | OUTPATIENT
Start: 2023-07-18

## 2023-08-08 DIAGNOSIS — E11.9 TYPE 2 DIABETES MELLITUS WITHOUT COMPLICATION, WITHOUT LONG-TERM CURRENT USE OF INSULIN (CMS/HCC): ICD-10-CM

## 2023-08-08 PROBLEM — I34.0 NONRHEUMATIC MITRAL VALVE REGURGITATION: Status: ACTIVE | Noted: 2023-08-08

## 2023-08-08 PROBLEM — I35.0 NONRHEUMATIC AORTIC VALVE STENOSIS: Status: ACTIVE | Noted: 2023-08-08

## 2023-08-08 PROBLEM — E78.2 MIXED DYSLIPIDEMIA: Status: ACTIVE | Noted: 2018-03-26

## 2023-08-08 RX ORDER — ACARBOSE 100 MG/1
TABLET ORAL
Qty: 270 TABLET | Refills: 3 | Status: SHIPPED | OUTPATIENT
Start: 2023-08-08 | End: 2024-11-20

## 2023-08-08 NOTE — PROGRESS NOTES
Cardiology  Office Progress Note       Reason for visit:   Chief Complaint   Patient presents with   • Follow-up     HPI     Citlali Gifford is a 67 y.o. female who presents to the office for cardiovascular follow up and management of hypertension, dyslipidemia, aortic stenosis and mitral regurgitation.    She was previously followed by Dr. Lupillo Cooley.  Her last visit with him was on 23. Her last echocardiogram was on 23.    She denies any chest pain, shortness of breath, edema, palpitations, near syncope or syncope.    She started [employer] 2 weeks ago and is already down 7 lbs.  She is active watching her young grandchildren 5 days a week.    She feels well and offers no complaints today.    FLP 3/14/23:  , , HDL 37, LDL 76     Past Medical History:   Diagnosis Date   • Anxiety    • Aortic stenosis    • Claustrophobia    • Elevated liver enzymes     dx-  fatty liver   • Hypertension    • Impaired hearing     bilateral - no hearing aids   • Lipid disorder    • Numbness     left foot- secondary to previous  back injury    • Postmenopausal    • Sleep apnea     compliant with CPAP- nightly   • Tendonitis     left shoulder - treatment with PT   • Type 2 diabetes mellitus (CMS/HCC)    • Vertigo     ongoing- intermittent with position changes- treatment with meclizine and nasal spray     Past Surgical History:   Procedure Laterality Date   •  SECTION     • CHOLECYSTECTOMY     • DILATION AND EVACUATION     • ENDOMETRIAL ABLATION     • GALLBLADDER SURGERY     • HERNIA REPAIR     • HYSTERECTOMY     • KNEE ARTHROSCOPY Right     repair torn meniscus   • TONSILLECTOMY       Social History     Tobacco Use   • Smoking status: Never   • Smokeless tobacco: Never   Vaping Use   • Vaping Use: Never used   Substance Use Topics   • Alcohol use: Not Currently     Comment: ocasional   • Drug use: Never     Family History   Problem Relation Age of Onset   • Hypertension Biological Mother   "  • Diabetes Biological Mother    • Heart attack Biological Father    • No Known Problems Biological Sister    • Hypertension Biological Brother    • Diabetes Maternal Grandmother    • Colon cancer Paternal Grandfather    • No Known Problems Biological Daughter    • Hyperlipidemia Biological Daughter    • No Known Problems Biological Son      Allergies:  Amoxicillin-pot clavulanate, Sulfa (sulfonamide antibiotics), and Wellbutrin [bupropion hcl]    Current Outpatient Medications   Medication Sig Dispense Refill   • acarbose (PRECOSE) 100 mg tablet TAKE 1 TAB ORAL 3 X DAILY AT START OF EACH MEAL 3 TABS DAILY 270 tablet 3   • b complex vitamins tablet daily.     • blood sugar diagnostic (ONETOUCH VERIO TEST STRIPS) strip TEST 3 TIMES A  strip 7   • blood-glucose meter (ONETOUCH VERIO FLEX METER) misc CHECK GLUCOSE 3 TIMES ADAY 300 each 3   • cholecalciferol, vitamin D3, 25 mcg (1,000 unit) capsule One tablet daily     • folic acid (FOLVITE) 1 mg tablet Take 1 mg by mouth daily.     • lancets (ONETOUCH DELICA LANCETS) 33 gauge misc Check glucose 3 times aday 200 each 3   • liraglutide (VICTOZA 3-AZAM) 0.6 mg/0.1 mL (18 mg/3 mL) injection Inject 0.2 mL (1.2 mg total) under the skin daily. 18 mL 1   • lisinopriL (PRINIVIL) 20 mg tablet Take 20 mg by mouth daily.     • metFORMIN (GLUCOPHAGE) 500 mg tablet TAKE 2 TABLETS BY MOUTH TWICE A DAY WITH FOOD 360 tablet 3   • omega-3 fatty acids-fish oil 300-1,000 mg capsule take 1 capsule daily     • omeprazole (PriLOSEC) 20 mg capsule Take 20 mg by mouth daily before breakfast.     • pen needle, diabetic 32 gauge x 1/4\" needle Use for daily injection 100 each 3   • resvera/chrom/gr.tea/EGCG/dig3 (RESVERATROL DIET ORAL) Take by mouth.     • sertraline (ZOLOFT) 100 mg tablet Take 100 mg by mouth every evening.  2   • simvastatin (ZOCOR) 20 mg tablet Take 1 tablet (20 mg total) by mouth nightly. 90 tablet 3   • zinc sulfate (ZINCATE) 220 (50) mg capsule Take 220 mg by mouth " daily.     • estradioL (ESTRACE) 0.01 % (0.1 mg/gram) vaginal cream Apply nightly to  vagina for 1 week, then Monday/Wednesday/ Friday 42.5 g 5   • quinapriL (ACCUPRIL) 20 mg tablet Take 20 mg by mouth daily.   180 tablet 3     No current facility-administered medications for this visit.     Review of Systems   Constitutional: Negative for malaise/fatigue.   Cardiovascular: Negative for chest pain, dyspnea on exertion, irregular heartbeat, leg swelling, near-syncope, orthopnea, palpitations and syncope.   Hematologic/Lymphatic: Does not bruise/bleed easily.   Neurological: Negative for dizziness and light-headedness.   All other systems reviewed and are negative.    Objective     Vitals:    08/21/23 0803   BP: 120/74   Pulse: 85   SpO2: 98%     Wt Readings from Last 5 Encounters:   08/21/23 89.2 kg (196 lb 9.6 oz)   08/15/23 89.4 kg (197 lb)   03/23/23 93.4 kg (206 lb)   02/08/23 93 kg (205 lb)   01/30/23 93.4 kg (206 lb)     Body mass index is 37.15 kg/m².    Physical Exam  Vitals and nursing note reviewed.   Constitutional:       Appearance: Normal appearance. She is well-developed.   HENT:      Head: Normocephalic and atraumatic.   Eyes:      General: No scleral icterus.  Neck:      Vascular: Carotid bruit present. No JVD.   Cardiovascular:      Rate and Rhythm: Normal rate and regular rhythm.      Pulses: Normal pulses and intact distal pulses.           Carotid pulses are 2+ on the right side with bruit and 2+ on the left side with bruit.       Radial pulses are 2+ on the right side and 2+ on the left side.        Dorsalis pedis pulses are 2+ on the right side and 2+ on the left side.        Posterior tibial pulses are 2+ on the right side and 2+ on the left side.      Heart sounds: S1 normal and S2 normal. Murmur (II/VI RADHA at base that radiates towards bilateral carotids. ) heard.      No friction rub. No gallop. No S3 or S4 sounds.   Pulmonary:      Effort: Pulmonary effort is normal. No respiratory  distress.      Breath sounds: Normal breath sounds. No stridor. No wheezing, rhonchi or rales.   Chest:      Chest wall: No tenderness.   Abdominal:      General: Bowel sounds are normal. There is no distension.      Palpations: Abdomen is soft. There is no mass.      Tenderness: There is no abdominal tenderness. There is no guarding or rebound.   Musculoskeletal:      Right lower leg: No edema.      Left lower leg: No edema.   Skin:     General: Skin is warm and dry.      Coloration: Skin is not pale.      Findings: No erythema or rash.   Neurological:      General: No focal deficit present.      Mental Status: She is alert and oriented to person, place, and time.   Psychiatric:         Mood and Affect: Mood normal.         Behavior: Behavior normal.         Thought Content: Thought content normal.         Judgment: Judgment normal.       ECG   sinus rhythm, I have independently reviewed the patient's ECG results.  Significant abnormals are :.   Normal sinus rhythm 83bpm  Septal infarct , age undetermined   Abnormal ECG   When compared with ECG of 05-DEC-2016 11:01,   No significant change was found   Confirmed by Alvaro Cooley ([id number]) on 8/21/2023 8:20:07 AM    Hematology  Lab Results   Component Value Date    WBC 6.7 12/15/2020    HGB 14.4 12/15/2020    HCT 43.9 12/15/2020     12/15/2020    INR 1.0 12/05/2016     Chemistries  Lab Results   Component Value Date     03/13/2023    K 4.3 03/13/2023     03/13/2023    CREATININE 0.73 03/13/2023    BUN 23 03/13/2023    CO2 26 03/13/2023    GLUCOSE 125 (H) 03/13/2023    CALCIUM 9.2 03/13/2023    MG 2.2 12/05/2016    ALT 97 (H) 09/09/2021    AST 60 (H) 09/09/2021     Cholesterol  Lab Results   Component Value Date    CHOL 135 03/13/2023    TRIG 135 03/13/2023    HDL 37 (L) 03/13/2023    LDLCALC 76 03/13/2023     Endocrine  Lab Results   Component Value Date    TSH 3.03 12/15/2020    FREET4 0.86 10/26/2015    HGBA1C 6.8 (H) 03/13/2023     Cardiac  Imaging    TRANSTHORACIC ECHO (TTE) COMPLETE 01/30/2023    Interpretation Summary  •  Left Ventricle: Normal ventricle size. Mild concentric left ventricular hypertrophy. Muscle mass is increased. Estimated EF 70%. Wall motion appears grossly normal. Grade I diastolic dysfunction.  •  Right Ventricle: Normal ventricle size. Normal wall thickness. Normal systolic function. Normal regional wall motion.  •  Left Atrium: Moderately dilated atrium.  •  Right Atrium: Normal sized atrium.  •  Aorta: Aortic root sclerotic. Sinuses of Valsalva sclerotic. Ascending aorta sclerotic. Aortic arch normal-sized.  •  IVC/SVC: Inferior vena cava is a small caliber vessel (<1.7cm). Inferior vena cava collapses >50% during inspiration.  •  Pericardium: Normal structure.  •  Aortic Valve: Valve not well seen but likely trileaflet.  Mild diffuse calcification present with reduced excursion of the noncoronary cusp. Trace regurgitation. Mild stenosis. Peak velocity = 3.18 m/s. Mean gradient = 22.00 mmHg. Calculated area by cont eq = 1.52 cm2. Calculated dimensionless index = 0.40.  •  Mitral Valve: Sclerotic mitral valve. Mild mitral annular calcification. Mild regurgitation.  •  Tricuspid Valve: Structure is grossly normal.    The LV is mildly thickened with normal dimension and an EF of 70% with a gradient within the ventricle as a result of hypertrophied papillary muscles.  There is diastolic dysfunction type 1.  The LA is moderately enlarged.  There is mild MAC with mild central MR.  The RV and RA are top normal in dimension the RV shows normal function,the tricuspid valve is grossly normal with mild to trace TR.  The jet is to small to calculate the RVS .  The aortic valve is difficult to see the left cusp.  There is mild AS and trace AR there is calcification.  The aortic root is top normal with sclerosis.  The IVC is normal in dimension and motion.    Assessment/Plan     Essential hypertension  - Target BP < 130/80  .  - Continue  Quinapril 20mg q day    Valvular heart disease  - Echo 1/2023: Mild CLVH. Ef 70% Intracavitary gradient. GI DD. Moderate AS, peak 3.18m/s, mean 22mmHg, KASSY 1.52cm2. DI 0.40. Mild MR.   .  - Continue same medications  - Echo with next visit.     Mixed hyperlipidemia  - target LDL <  70  - FLP 3/14/23:  , , HDL 37, LDL 76  .  - Not quite at goal on most recent check  - Working on diet/exercise/lost 7lbs with WW the last month.   - Continue Simvastatin 20mg q PM   - repeat FLP prior to next visit.   - Low threshold to switch to Atorvastatin if still not at goal.     Type 2 diabetes mellitus without complication, without long-term current use of insulin (CMS/[facility])  - continue statin and ace.     Follow Up Plans:  Return in about 6 months (around 2/21/2024).          I, Crystal Chandler, am scribing for, and in the presence of, Alvaro Cooley DO.    I, Alvaro Cooley DO, personally performed the services described in this documentation as scribed by Crystal Chandler in my presence, and it is both accurate and complete.       Alvaro Cooley DO  8/21/2023

## 2023-08-08 NOTE — TELEPHONE ENCOUNTER
NOV 9/27/23  LOV  3/23/23    DM2 med regimen:  Metformin 1000 mg p.o. twice daily  Acarbose 100 mg p.o. 3 times a day with meals       Recommend:  -cont Victoza 1.8 m g once day and same dose of Metformin and Acarbose  -see CDE for nutritional counseling before next visit    rx sent

## 2023-08-14 ENCOUNTER — ANESTHESIA EVENT (OUTPATIENT)
Dept: ENDOSCOPY | Facility: HOSPITAL | Age: 68
End: 2023-08-14
Payer: MEDICARE

## 2023-08-15 ENCOUNTER — HOSPITAL ENCOUNTER (OUTPATIENT)
Facility: HOSPITAL | Age: 68
Discharge: HOME | End: 2023-08-15
Attending: COLON & RECTAL SURGERY | Admitting: COLON & RECTAL SURGERY
Payer: MEDICARE

## 2023-08-15 ENCOUNTER — ANESTHESIA (OUTPATIENT)
Dept: ENDOSCOPY | Facility: HOSPITAL | Age: 68
End: 2023-08-15
Payer: MEDICARE

## 2023-08-15 VITALS
HEART RATE: 64 BPM | OXYGEN SATURATION: 97 % | SYSTOLIC BLOOD PRESSURE: 100 MMHG | BODY MASS INDEX: 37.19 KG/M2 | WEIGHT: 197 LBS | HEIGHT: 61 IN | RESPIRATION RATE: 20 BRPM | TEMPERATURE: 97.2 F | DIASTOLIC BLOOD PRESSURE: 57 MMHG

## 2023-08-15 LAB
GLUCOSE BLD-MCNC: 134 MG/DL (ref 70–99)
POCT TEST: ABNORMAL

## 2023-08-15 PROCEDURE — 71000001 HC PACU PHASE 1 INITIAL 30MIN: Performed by: COLON & RECTAL SURGERY

## 2023-08-15 PROCEDURE — 25800000 HC PHARMACY IV SOLUTIONS: Performed by: NURSE ANESTHETIST, CERTIFIED REGISTERED

## 2023-08-15 PROCEDURE — 99024 POSTOP FOLLOW-UP VISIT: CPT | Performed by: COLON & RECTAL SURGERY

## 2023-08-15 PROCEDURE — 63700000 HC SELF-ADMINISTRABLE DRUG: Performed by: COLON & RECTAL SURGERY

## 2023-08-15 PROCEDURE — G0105 COLORECTAL SCRN; HI RISK IND: HCPCS | Performed by: COLON & RECTAL SURGERY

## 2023-08-15 PROCEDURE — 0DJD8ZZ INSPECTION OF LOWER INTESTINAL TRACT, VIA NATURAL OR ARTIFICIAL OPENING ENDOSCOPIC: ICD-10-PCS | Performed by: COLON & RECTAL SURGERY

## 2023-08-15 PROCEDURE — 25000000 HC PHARMACY GENERAL: Performed by: NURSE ANESTHETIST, CERTIFIED REGISTERED

## 2023-08-15 PROCEDURE — 75000014 HC COLONSCOPY DIAGNOSTIC: Performed by: COLON & RECTAL SURGERY

## 2023-08-15 PROCEDURE — 63600000 HC DRUGS/DETAIL CODE: Mod: JW | Performed by: NURSE ANESTHETIST, CERTIFIED REGISTERED

## 2023-08-15 PROCEDURE — 37000002 HC ANESTHESIA MAC: Performed by: COLON & RECTAL SURGERY

## 2023-08-15 RX ORDER — PHENYLEPHRINE HCL IN 0.9% NACL 1 MG/10 ML
SYRINGE (ML) INTRAVENOUS AS NEEDED
Status: DISCONTINUED | OUTPATIENT
Start: 2023-08-15 | End: 2023-08-15 | Stop reason: SURG

## 2023-08-15 RX ORDER — PROPOFOL 200MG/20ML
SYRINGE (ML) INTRAVENOUS AS NEEDED
Status: DISCONTINUED | OUTPATIENT
Start: 2023-08-15 | End: 2023-08-15 | Stop reason: SURG

## 2023-08-15 RX ORDER — SODIUM CHLORIDE 9 MG/ML
INJECTION, SOLUTION INTRAVENOUS CONTINUOUS PRN
Status: DISCONTINUED | OUTPATIENT
Start: 2023-08-15 | End: 2023-08-15 | Stop reason: SURG

## 2023-08-15 RX ORDER — DEXTROMETHORPHAN/PSEUDOEPHED 2.5-7.5/.8
DROPS ORAL
Status: DISCONTINUED | OUTPATIENT
Start: 2023-08-15 | End: 2023-08-15 | Stop reason: HOSPADM

## 2023-08-15 RX ORDER — LIDOCAINE HYDROCHLORIDE 10 MG/ML
INJECTION, SOLUTION EPIDURAL; INFILTRATION; INTRACAUDAL; PERINEURAL AS NEEDED
Status: DISCONTINUED | OUTPATIENT
Start: 2023-08-15 | End: 2023-08-15 | Stop reason: SURG

## 2023-08-15 RX ORDER — PROPOFOL 10 MG/ML
INJECTION, EMULSION INTRAVENOUS CONTINUOUS PRN
Status: DISCONTINUED | OUTPATIENT
Start: 2023-08-15 | End: 2023-08-15 | Stop reason: SURG

## 2023-08-15 RX ADMIN — PROPOFOL 140 MCG/KG/MIN: 10 INJECTION, EMULSION INTRAVENOUS at 09:37

## 2023-08-15 RX ADMIN — SODIUM CHLORIDE: 9 INJECTION, SOLUTION INTRAVENOUS at 09:31

## 2023-08-15 RX ADMIN — Medication 100 MCG: at 10:03

## 2023-08-15 RX ADMIN — Medication 50 MCG: at 09:54

## 2023-08-15 RX ADMIN — PROPOFOL 60 MG: 10 INJECTION, EMULSION INTRAVENOUS at 09:37

## 2023-08-15 RX ADMIN — LIDOCAINE HYDROCHLORIDE 5 ML: 10 INJECTION, SOLUTION EPIDURAL; INFILTRATION; INTRACAUDAL; PERINEURAL at 09:37

## 2023-08-15 NOTE — OP NOTE
_______________________________________________________________________________  Patient Name: Citlali Gifford       Procedure Date: 8/15/2023 9:25 AM  MRN: [id number]                     Account Number: [id number]  YOB: 1955             Age: 67  Gender: Female                        Note Status: Finalized  Attending MD: DARA FERNANDEZ MD,  _______________________________________________________________________________  Procedure:             Colonoscopy  Indications:           High risk colon cancer surveillance: Personal history  of colonic polyps  Providers:             DARA FERNANDEZ MD (Doctor)  Referring MD:          JORGE SANTOS DO  Requesting Provider:  Medicines:             Monitored Anesthesia Care  Complications:         No immediate complications.  _______________________________________________________________________________  Procedure:             After I obtained informed consent, the scope was  passed under direct vision. Throughout the procedure,  the patient's blood pressure, pulse, and oxygen  saturations were monitored continuously. The  colonoscope was introduced through the anus and  advanced to the cecum, identified by appendiceal  orifice and ileocecal valve. The colonoscopy was  performed without difficulty. The patient tolerated  the procedure well. The quality of the bowel  preparation was excellent.  Findings:  There is no endoscopic evidence of diverticula, inflammation or polyps  in the entire colon.  The entire examined colon appeared normal on direct and retroflexion  views.  Impression:            - The entire examined colon is normal on direct and  retroflexion views.  - No specimens collected.  Recommendation:        - Repeat colonoscopy in 5 years for surveillance.  Procedure Code(s):     --- Professional ---  58799, Colonoscopy, flexible; diagnostic, including  collection of specimen(s) by brushing or washing, when  performed (separate  procedure)  Diagnosis Code(s):     --- Professional ---  Z86.010, Personal history of colonic polyps  CPT copyright 2021 [employer]. All rights reserved.  The codes documented in this report are preliminary and upon  review may  be revised to meet current compliance requirements.  Attending Participation:  I personally performed the entire procedure.  Liam Fernandez JR, MD  __________________  LIAM FERNANDEZ MD  8/15/2023 10:05:45 AM  This report has been signed electronically.  Number of Addenda: 0  Note Initiated On: 8/15/2023 9:17 AM

## 2023-08-15 NOTE — H&P
General Surgery History and Physical      HPI     Patient is a 67 y.o. female who presents for colonoscopy for a history of colon polyps.  Her last colonoscopy was 2018 and a 3 mm tubular adenoma was removed from the ascending colon.     Medical History:   Past Medical History:   Diagnosis Date   • Anxiety    • Aortic stenosis    • Claustrophobia    • Elevated liver enzymes     dx-  fatty liver   • Hypertension    • Impaired hearing     bilateral - no hearing aids   • Lipid disorder    • Numbness     left foot- secondary to previous  back injury    • Postmenopausal    • Sleep apnea     compliant with CPAP- nightly   • Tendonitis     left shoulder - treatment with PT   • Type 2 diabetes mellitus (CMS/HCC)    • Vertigo     ongoing- intermittent with position changes- treatment with meclizine and nasal spray       Surgical History:   Past Surgical History:   Procedure Laterality Date   •  SECTION     • CHOLECYSTECTOMY     • DILATION AND EVACUATION     • ENDOMETRIAL ABLATION     • GALLBLADDER SURGERY     • HERNIA REPAIR     • HYSTERECTOMY     • KNEE ARTHROSCOPY Right     repair torn meniscus   • TONSILLECTOMY         Social History:   Social History     Social History Narrative   • Not on file       Family History:   Family History   Problem Relation Age of Onset   • Hypertension Biological Mother    • Diabetes Biological Mother    • Heart attack Biological Father    • No Known Problems Biological Sister    • Hypertension Biological Brother    • Diabetes Maternal Grandmother    • Colon cancer Paternal Grandfather    • No Known Problems Biological Daughter    • Hyperlipidemia Biological Daughter    • No Known Problems Biological Son        Allergies: Amoxicillin-pot clavulanate, Sulfa (sulfonamide antibiotics), and Wellbutrin [bupropion hcl]    Home Medications:  •  acarbose, TAKE 1 TAB ORAL 3 X DAILY AT START OF EACH MEAL 3 TABS DAILY  •  b complex vitamins, daily.  •  ONETOUCH VERIO TEST STRIPS, TEST 3  TIMES A DAY  •  blood-glucose meter, CHECK GLUCOSE 3 TIMES ADAY  •  cholecalciferol (vitamin D3), One tablet daily  •  estradioL, Apply nightly to  vagina for 1 week, then Monday/Wednesday/ Friday  •  folic acid, Take 1 mg by mouth daily.  •  lancets, Check glucose 3 times aday  •  VICTOZA 3-AZAM, Inject 0.2 mL (1.2 mg total) under the skin daily.  •  lisinopriL, Take 20 mg by mouth daily.  •  metFORMIN, TAKE 2 TABLETS BY MOUTH TWICE A DAY WITH FOOD  •  omega-3 fatty acids-fish oil, take 1 capsule daily  •  omeprazole, Take 20 mg by mouth daily before breakfast.  •  pen needle, diabetic, Use for daily injection  •  quinapriL, Take 20 mg by mouth daily.    •  resvera/chrom/gr.tea/EGCG/dig3 (RESVERATROL DIET ORAL), Take by mouth.  •  sertraline, Take 100 mg by mouth every evening.  •  simvastatin, Take 1 tablet (20 mg total) by mouth nightly.  •  zinc sulfate, Take 220 mg by mouth daily.    Current Medications:      Review of Systems   All other systems reviewed and are negative.      Objective     Vital Signs for the last 24 hours:  Temp:  [36.1 °C (97 °F)] 36.1 °C (97 °F)  Heart Rate:  [80] 80  Resp:  [18] 18  BP: (122)/(80) 122/80    Physicial Exam  Lungs: clear to auscultation bilaterally  Heart: regular rate and rhythm, S1, S2 normal, no murmur, click, rub or gallop  Abdomen: soft, non-tender; bowel sounds normal; no masses, no organomegaly      Plan: Proceed with endoscopy as scheduled.

## 2023-08-15 NOTE — ANESTHESIA PREPROCEDURE EVALUATION
Relevant Problems   CARDIOVASCULAR   (+) Essential hypertension   (+) Mixed dyslipidemia   (+) Nonrheumatic aortic valve stenosis   (+) Nonrheumatic mitral valve regurgitation      GASTROINTESTINAL   (+) Rectal bleeding      Other   (+) Type 2 diabetes mellitus without complication (CMS/HCC)       ROS/Med Hx     Past Surgical History:   Procedure Laterality Date   •  SECTION     • CHOLECYSTECTOMY     • DILATION AND EVACUATION     • ENDOMETRIAL ABLATION     • GALLBLADDER SURGERY     • HERNIA REPAIR     • HYSTERECTOMY     • KNEE ARTHROSCOPY Right     repair torn meniscus   • TONSILLECTOMY         Physical Exam    Airway   Mallampati: I   Neck ROM: full  Cardiovascular - normal   Rhythm: regular   Rate: normalPulmonary - normal   clear to auscultation  Dental - normal           Left Ventricle: Normal ventricle size. Mild concentric left ventricular hypertrophy. Muscle mass is increased. Estimated EF 70%. Wall motion appears grossly normal. Grade I diastolic dysfunction.  •  Right Ventricle: Normal ventricle size. Normal wall thickness. Normal systolic function. Normal regional wall motion.  •  Left Atrium: Moderately dilated atrium.  •  Right Atrium: Normal sized atrium.  •  Aorta: Aortic root sclerotic. Sinuses of Valsalva sclerotic. Ascending aorta sclerotic. Aortic arch normal-sized.  •  IVC/SVC: Inferior vena cava is a small caliber vessel (<1.7cm). Inferior vena cava collapses >50% during inspiration.  •  Pericardium: Normal structure.  •  Aortic Valve: Valve not well seen but likely trileaflet.  Mild diffuse calcification present with reduced excursion of the noncoronary cusp. Trace regurgitation. Mild stenosis. Peak velocity = 3.18 m/s. Mean gradient = 22.00 mmHg. Calculated area by cont eq = 1.52 cm2. Calculated dimensionless index = 0.40.  •  Mitral Valve: Sclerotic mitral valve. Mild mitral annular calcification. Mild regurgitation.  •  Tricuspid Valve: Structure is grossly normal.        Anesthesia  Plan    Plan: MAC   3 ASA

## 2023-08-15 NOTE — ANESTHESIOLOGIST PRE-PROCEDURE ATTESTATION
Pre-Procedure Patient Identification:  I am the Primary Anesthesiologist and have identified the patient on 08/15/23 at 9:28 AM.   I have confirmed the procedure(s) will be performed by the following surgeon/proceduralist Norosemary, Liam MOONEY MD.

## 2023-08-15 NOTE — ANESTHESIA POSTPROCEDURE EVALUATION
Patient: Citlali Gifford    Procedure Summary     Date: 08/15/23 Room / Location: [facility] 4 (D) / [facility]    Anesthesia Start: 0931 Anesthesia Stop: 1006    Procedure: COLONOSCOPY (open access) (Anus) Diagnosis:       History of adenomatous polyp of colon      (History of adenomatous polyp of colon [Z86.010])    Providers: Liam Toribio MD Responsible Provider: Betty Colin MD    Anesthesia Type: MAC ASA Status: 3          Anesthesia Type: MAC  PACU Vitals  8/15/2023 0958 - 8/15/2023 1030      8/15/2023  1004 8/15/2023  1009 8/15/2023  1022       BP: 91/61 92/56 100/57     Temp: 36.2 °C (97.2 °F) -- --     Pulse: 62 67 64     Resp: 18 22 20     SpO2: 97 % 96 % 97 %             Anesthesia Post Evaluation    Pain management: adequate  Patient participation: complete - patient participated  Level of consciousness: awake and alert  Cardiovascular status: acceptable  Airway Patency: adequate  Respiratory status: acceptable  Hydration status: acceptable  Anesthetic complications: no

## 2023-08-15 NOTE — OR SURGEON
Pre-Procedure patient identification:  I am the primary operating surgeon/proceduralist and I have reviewed the applicable pathology reports and radiology studies for this procedure. I have identified the patient on 08/15/23 at 9:24 AM Liam Toribio MD  Phone Number: [phone]

## 2023-08-21 ENCOUNTER — OFFICE VISIT (OUTPATIENT)
Dept: CARDIOLOGY | Facility: CLINIC | Age: 68
End: 2023-08-21
Payer: MEDICARE

## 2023-08-21 VITALS
HEART RATE: 85 BPM | HEIGHT: 61 IN | DIASTOLIC BLOOD PRESSURE: 74 MMHG | BODY MASS INDEX: 37.12 KG/M2 | SYSTOLIC BLOOD PRESSURE: 120 MMHG | WEIGHT: 196.6 LBS | OXYGEN SATURATION: 98 %

## 2023-08-21 DIAGNOSIS — I38 VALVULAR HEART DISEASE: ICD-10-CM

## 2023-08-21 DIAGNOSIS — I10 ESSENTIAL HYPERTENSION: Primary | ICD-10-CM

## 2023-08-21 DIAGNOSIS — E78.2 MIXED HYPERLIPIDEMIA: ICD-10-CM

## 2023-08-21 DIAGNOSIS — E11.9 TYPE 2 DIABETES MELLITUS WITHOUT COMPLICATION, WITHOUT LONG-TERM CURRENT USE OF INSULIN (CMS/HCC): ICD-10-CM

## 2023-08-21 PROBLEM — H69.93 CHRONIC EUSTACHIAN TUBE DYSFUNCTION, BILATERAL: Status: RESOLVED | Noted: 2018-08-07 | Resolved: 2023-08-21

## 2023-08-21 PROBLEM — E55.9 VITAMIN D DEFICIENCY: Status: RESOLVED | Noted: 2021-09-16 | Resolved: 2023-08-21

## 2023-08-21 PROBLEM — J30.1 CHRONIC SEASONAL ALLERGIC RHINITIS DUE TO POLLEN: Status: RESOLVED | Noted: 2018-08-07 | Resolved: 2023-08-21

## 2023-08-21 PROBLEM — R79.89 ABNORMAL LFTS: Status: RESOLVED | Noted: 2021-05-06 | Resolved: 2023-08-21

## 2023-08-21 PROBLEM — H65.491: Status: RESOLVED | Noted: 2018-08-07 | Resolved: 2023-08-21

## 2023-08-21 PROBLEM — H90.A21 SENSORINEURAL HEARING LOSS (SNHL) OF RIGHT EAR WITH RESTRICTED HEARING OF LEFT EAR: Status: RESOLVED | Noted: 2018-08-07 | Resolved: 2023-08-21

## 2023-08-21 LAB
ATRIAL RATE: 83
P AXIS: 54
PR INTERVAL: 182
QRS DURATION: 80
QT INTERVAL: 378
QTC CALCULATION(BAZETT): 444
R AXIS: 14
T WAVE AXIS: 54
VENTRICULAR RATE: 83

## 2023-08-21 PROCEDURE — G8754 DIAS BP LESS 90: HCPCS | Performed by: INTERNAL MEDICINE

## 2023-08-21 PROCEDURE — G8752 SYS BP LESS 140: HCPCS | Performed by: INTERNAL MEDICINE

## 2023-08-21 PROCEDURE — 99215 OFFICE O/P EST HI 40 MIN: CPT | Performed by: INTERNAL MEDICINE

## 2023-08-21 PROCEDURE — 93000 ELECTROCARDIOGRAM COMPLETE: CPT | Performed by: INTERNAL MEDICINE

## 2023-08-21 ASSESSMENT — ENCOUNTER SYMPTOMS
PALPITATIONS: 0
SYNCOPE: 0
LIGHT-HEADEDNESS: 0
BRUISES/BLEEDS EASILY: 0
ORTHOPNEA: 0
IRREGULAR HEARTBEAT: 0
DYSPNEA ON EXERTION: 0
NEAR-SYNCOPE: 0
DIZZINESS: 0

## 2023-08-21 NOTE — LETTER
August 21, 2023     Billy Antunez DO  [address] [facility] [id number]    Patient: Citlali Gifford  YOB: 1955  Date of Visit: 8/21/2023      Dear Dr. Antunez:    Thank you for referring Citlali Gifford to me for evaluation. Below are my notes for this consultation.    If you have questions, please do not hesitate to call me. I look forward to following your patient along with you.         Sincerely,        Alvaro Cooley DO        CC: TERESA Del Rio William N, DO  8/21/2023  8:52 AM  Sign when Signing Visit       Cardiology  Office Progress Note       Reason for visit:   Chief Complaint   Patient presents with   • Follow-up     HPI     Citlali Gifford is a 67 y.o. female who presents to the office for cardiovascular follow up and management of hypertension, dyslipidemia, aortic stenosis and mitral regurgitation.    She was previously followed by Dr. Lupillo Cooley.  Her last visit with him was on 2/8/23. Her last echocardiogram was on 1/30/23.    She denies any chest pain, shortness of breath, edema, palpitations, near syncope or syncope.    She started Weight Watchers 2 weeks ago and is already down 7 lbs.  She is active watching her young grandchildren 5 days a week.    She feels well and offers no complaints today.    FLP 3/14/23:  , , HDL 37, LDL 76     Past Medical History:   Diagnosis Date   • Anxiety    • Aortic stenosis    • Claustrophobia    • Elevated liver enzymes     dx-  fatty liver   • Hypertension    • Impaired hearing     bilateral - no hearing aids   • Lipid disorder    • Numbness     left foot- secondary to previous  back injury    • Postmenopausal    • Sleep apnea     compliant with CPAP- nightly   • Tendonitis     left shoulder - treatment with PT   • Type 2 diabetes mellitus (CMS/HCC)    • Vertigo     ongoing- intermittent with position changes- treatment with meclizine and nasal spray     Past Surgical History:   Procedure  Laterality Date   •  SECTION     • CHOLECYSTECTOMY     • DILATION AND EVACUATION     • ENDOMETRIAL ABLATION     • GALLBLADDER SURGERY     • HERNIA REPAIR     • HYSTERECTOMY     • KNEE ARTHROSCOPY Right     repair torn meniscus   • TONSILLECTOMY       Social History     Tobacco Use   • Smoking status: Never   • Smokeless tobacco: Never   Vaping Use   • Vaping Use: Never used   Substance Use Topics   • Alcohol use: Not Currently     Comment: ocasional   • Drug use: Never     Family History   Problem Relation Age of Onset   • Hypertension Biological Mother    • Diabetes Biological Mother    • Heart attack Biological Father    • No Known Problems Biological Sister    • Hypertension Biological Brother    • Diabetes Maternal Grandmother    • Colon cancer Paternal Grandfather    • No Known Problems Biological Daughter    • Hyperlipidemia Biological Daughter    • No Known Problems Biological Son      Allergies:  Amoxicillin-pot clavulanate, Sulfa (sulfonamide antibiotics), and Wellbutrin [bupropion hcl]    Current Outpatient Medications   Medication Sig Dispense Refill   • acarbose (PRECOSE) 100 mg tablet TAKE 1 TAB ORAL 3 X DAILY AT START OF EACH MEAL 3 TABS DAILY 270 tablet 3   • b complex vitamins tablet daily.     • blood sugar diagnostic (ONETOUCH VERIO TEST STRIPS) strip TEST 3 TIMES A  strip 7   • blood-glucose meter (ONETOUCH VERIO FLEX METER) misc CHECK GLUCOSE 3 TIMES ADAY 300 each 3   • cholecalciferol, vitamin D3, 25 mcg (1,000 unit) capsule One tablet daily     • folic acid (FOLVITE) 1 mg tablet Take 1 mg by mouth daily.     • lancets (ONETOUCH DELICA LANCETS) 33 gauge misc Check glucose 3 times aday 200 each 3   • liraglutide (VICTOZA 3-AZAM) 0.6 mg/0.1 mL (18 mg/3 mL) injection Inject 0.2 mL (1.2 mg total) under the skin daily. 18 mL 1   • lisinopriL (PRINIVIL) 20 mg tablet Take 20 mg by mouth daily.     • metFORMIN (GLUCOPHAGE) 500 mg tablet TAKE 2 TABLETS BY MOUTH TWICE A DAY WITH FOOD 360  "tablet 3   • omega-3 fatty acids-fish oil 300-1,000 mg capsule take 1 capsule daily     • omeprazole (PriLOSEC) 20 mg capsule Take 20 mg by mouth daily before breakfast.     • pen needle, diabetic 32 gauge x 1/4\" needle Use for daily injection 100 each 3   • resvera/chrom/gr.tea/EGCG/dig3 (RESVERATROL DIET ORAL) Take by mouth.     • sertraline (ZOLOFT) 100 mg tablet Take 100 mg by mouth every evening.  2   • simvastatin (ZOCOR) 20 mg tablet Take 1 tablet (20 mg total) by mouth nightly. 90 tablet 3   • zinc sulfate (ZINCATE) 220 (50) mg capsule Take 220 mg by mouth daily.     • estradioL (ESTRACE) 0.01 % (0.1 mg/gram) vaginal cream Apply nightly to  vagina for 1 week, then Monday/Wednesday/ Friday 42.5 g 5   • quinapriL (ACCUPRIL) 20 mg tablet Take 20 mg by mouth daily.   180 tablet 3     No current facility-administered medications for this visit.     Review of Systems   Constitutional: Negative for malaise/fatigue.   Cardiovascular: Negative for chest pain, dyspnea on exertion, irregular heartbeat, leg swelling, near-syncope, orthopnea, palpitations and syncope.   Hematologic/Lymphatic: Does not bruise/bleed easily.   Neurological: Negative for dizziness and light-headedness.   All other systems reviewed and are negative.    Objective     Vitals:    08/21/23 0803   BP: 120/74   Pulse: 85   SpO2: 98%     Wt Readings from Last 5 Encounters:   08/21/23 89.2 kg (196 lb 9.6 oz)   08/15/23 89.4 kg (197 lb)   03/23/23 93.4 kg (206 lb)   02/08/23 93 kg (205 lb)   01/30/23 93.4 kg (206 lb)     Body mass index is 37.15 kg/m².    Physical Exam  Vitals and nursing note reviewed.   Constitutional:       Appearance: Normal appearance. She is well-developed.   HENT:      Head: Normocephalic and atraumatic.   Eyes:      General: No scleral icterus.  Neck:      Vascular: Carotid bruit present. No JVD.   Cardiovascular:      Rate and Rhythm: Normal rate and regular rhythm.      Pulses: Normal pulses and intact distal pulses.         "   Carotid pulses are 2+ on the right side with bruit and 2+ on the left side with bruit.       Radial pulses are 2+ on the right side and 2+ on the left side.        Dorsalis pedis pulses are 2+ on the right side and 2+ on the left side.        Posterior tibial pulses are 2+ on the right side and 2+ on the left side.      Heart sounds: S1 normal and S2 normal. Murmur (II/VI RADHA at base that radiates towards bilateral carotids. ) heard.      No friction rub. No gallop. No S3 or S4 sounds.   Pulmonary:      Effort: Pulmonary effort is normal. No respiratory distress.      Breath sounds: Normal breath sounds. No stridor. No wheezing, rhonchi or rales.   Chest:      Chest wall: No tenderness.   Abdominal:      General: Bowel sounds are normal. There is no distension.      Palpations: Abdomen is soft. There is no mass.      Tenderness: There is no abdominal tenderness. There is no guarding or rebound.   Musculoskeletal:      Right lower leg: No edema.      Left lower leg: No edema.   Skin:     General: Skin is warm and dry.      Coloration: Skin is not pale.      Findings: No erythema or rash.   Neurological:      General: No focal deficit present.      Mental Status: She is alert and oriented to person, place, and time.   Psychiatric:         Mood and Affect: Mood normal.         Behavior: Behavior normal.         Thought Content: Thought content normal.         Judgment: Judgment normal.       ECG   sinus rhythm, I have independently reviewed the patient's ECG results.  Significant abnormals are :.   Normal sinus rhythm 83bpm  Septal infarct , age undetermined   Abnormal ECG   When compared with ECG of 05-DEC-2016 11:01,   No significant change was found   Confirmed by Alvaro Cooley ([id number]) on 8/21/2023 8:20:07 AM    Hematology  Lab Results   Component Value Date    WBC 6.7 12/15/2020    HGB 14.4 12/15/2020    HCT 43.9 12/15/2020     12/15/2020    INR 1.0 12/05/2016     Chemistries  Lab Results   Component  Value Date     03/13/2023    K 4.3 03/13/2023     03/13/2023    CREATININE 0.73 03/13/2023    BUN 23 03/13/2023    CO2 26 03/13/2023    GLUCOSE 125 (H) 03/13/2023    CALCIUM 9.2 03/13/2023    MG 2.2 12/05/2016    ALT 97 (H) 09/09/2021    AST 60 (H) 09/09/2021     Cholesterol  Lab Results   Component Value Date    CHOL 135 03/13/2023    TRIG 135 03/13/2023    HDL 37 (L) 03/13/2023    LDLCALC 76 03/13/2023     Endocrine  Lab Results   Component Value Date    TSH 3.03 12/15/2020    FREET4 0.86 10/26/2015    HGBA1C 6.8 (H) 03/13/2023     Cardiac Imaging    TRANSTHORACIC ECHO (TTE) COMPLETE 01/30/2023    Interpretation Summary  •  Left Ventricle: Normal ventricle size. Mild concentric left ventricular hypertrophy. Muscle mass is increased. Estimated EF 70%. Wall motion appears grossly normal. Grade I diastolic dysfunction.  •  Right Ventricle: Normal ventricle size. Normal wall thickness. Normal systolic function. Normal regional wall motion.  •  Left Atrium: Moderately dilated atrium.  •  Right Atrium: Normal sized atrium.  •  Aorta: Aortic root sclerotic. Sinuses of Valsalva sclerotic. Ascending aorta sclerotic. Aortic arch normal-sized.  •  IVC/SVC: Inferior vena cava is a small caliber vessel (<1.7cm). Inferior vena cava collapses >50% during inspiration.  •  Pericardium: Normal structure.  •  Aortic Valve: Valve not well seen but likely trileaflet.  Mild diffuse calcification present with reduced excursion of the noncoronary cusp. Trace regurgitation. Mild stenosis. Peak velocity = 3.18 m/s. Mean gradient = 22.00 mmHg. Calculated area by cont eq = 1.52 cm2. Calculated dimensionless index = 0.40.  •  Mitral Valve: Sclerotic mitral valve. Mild mitral annular calcification. Mild regurgitation.  •  Tricuspid Valve: Structure is grossly normal.    The LV is mildly thickened with normal dimension and an EF of 70% with a gradient within the ventricle as a result of hypertrophied papillary muscles.  There is  diastolic dysfunction type 1.  The LA is moderately enlarged.  There is mild MAC with mild central MR.  The RV and RA are top normal in dimension the RV shows normal function,the tricuspid valve is grossly normal with mild to trace TR.  The jet is to small to calculate the RVS .  The aortic valve is difficult to see the left cusp.  There is mild AS and trace AR there is calcification.  The aortic root is top normal with sclerosis.  The IVC is normal in dimension and motion.    Assessment/Plan     Essential hypertension  - Target BP < 130/80  .  - Continue Quinapril 20mg q day    Valvular heart disease  - Echo 1/2023: Mild CLVH. Ef 70% Intracavitary gradient. GI DD. Moderate AS, peak 3.18m/s, mean 22mmHg, KASSY 1.52cm2. DI 0.40. Mild MR.   .  - Continue same medications  - Echo with next visit.     Mixed hyperlipidemia  - target LDL <  70  - FLP 3/14/23:  , , HDL 37, LDL 76  .  - Not quite at goal on most recent check  - Working on diet/exercise/lost 7lbs with WW the last month.   - Continue Simvastatin 20mg q PM   - repeat FLP prior to next visit.   - Low threshold to switch to Atorvastatin if still not at goal.     Type 2 diabetes mellitus without complication, without long-term current use of insulin (CMS/[facility])  - continue statin and ace.     Follow Up Plans:  Return in about 6 months (around 2/21/2024).         I, Crystal Chandler, am scribing for, and in the presence of, Alvaro Cooley DO.    IAlvaro DO, personally performed the services described in this documentation as scribed by Crystal Chandler in my presence, and it is both accurate and complete.       Alvaro Cooley DO  8/21/2023

## 2023-08-21 NOTE — ASSESSMENT & PLAN NOTE
- Echo 1/2023: Mild CLVH. Ef 70% Intracavitary gradient. GI DD. Moderate AS, peak 3.18m/s, mean 22mmHg, KASSY 1.52cm2. DI 0.40. Mild MR.   .  - Continue same medications  - Echo with next visit.

## 2023-08-21 NOTE — ASSESSMENT & PLAN NOTE
- target LDL <  70  - FLP 3/14/23:  , , HDL 37, LDL 76  .  - Not quite at goal on most recent check  - Working on diet/exercise/lost 7lbs with WW the last month.   - Continue Simvastatin 20mg q PM   - repeat FLP prior to next visit.   - Low threshold to switch to Atorvastatin if still not at goal.

## 2023-08-21 NOTE — PATIENT INSTRUCTIONS
Continue same medications    Follow up in 6 months    Echo with next visit    Fasting labs towards the end of September

## 2023-09-22 ENCOUNTER — TELEPHONE (OUTPATIENT)
Dept: FAMILY MEDICINE | Facility: CLINIC | Age: 68
End: 2023-09-22
Payer: MEDICARE

## 2023-09-24 LAB
BUN SERPL-MCNC: 25 MG/DL (ref 7–25)
BUN/CREAT SERPL: ABNORMAL (CALC) (ref 6–22)
CALCIUM SERPL-MCNC: 9.8 MG/DL (ref 8.6–10.4)
CHLORIDE SERPL-SCNC: 106 MMOL/L (ref 98–110)
CO2 SERPL-SCNC: 25 MMOL/L (ref 20–32)
CREAT SERPL-MCNC: 0.8 MG/DL (ref 0.5–1.05)
EGFRCR SERPLBLD CKD-EPI 2021: 81 ML/MIN/1.73M2
GLUCOSE SERPL-MCNC: 126 MG/DL (ref 65–99)
HBA1C MFR BLD: 6.1 % OF TOTAL HGB
POTASSIUM SERPL-SCNC: 4.5 MMOL/L (ref 3.5–5.3)
SODIUM SERPL-SCNC: 140 MMOL/L (ref 135–146)

## 2023-09-26 ENCOUNTER — APPOINTMENT (OUTPATIENT)
Dept: URBAN - METROPOLITAN AREA CLINIC 203 | Age: 68
Setting detail: DERMATOLOGY
End: 2023-09-28

## 2023-09-26 DIAGNOSIS — L91.8 OTHER HYPERTROPHIC DISORDERS OF THE SKIN: ICD-10-CM

## 2023-09-26 DIAGNOSIS — L81.4 OTHER MELANIN HYPERPIGMENTATION: ICD-10-CM

## 2023-09-26 DIAGNOSIS — D22 MELANOCYTIC NEVI: ICD-10-CM

## 2023-09-26 DIAGNOSIS — L57.8 OTHER SKIN CHANGES DUE TO CHRONIC EXPOSURE TO NONIONIZING RADIATION: ICD-10-CM

## 2023-09-26 PROBLEM — D23.72 OTHER BENIGN NEOPLASM OF SKIN OF LEFT LOWER LIMB, INCLUDING HIP: Status: ACTIVE | Noted: 2023-09-26

## 2023-09-26 PROBLEM — D22.5 MELANOCYTIC NEVI OF TRUNK: Status: ACTIVE | Noted: 2023-09-26

## 2023-09-26 PROCEDURE — 99213 OFFICE O/P EST LOW 20 MIN: CPT

## 2023-09-26 PROCEDURE — OTHER REASSURANCE: OTHER

## 2023-09-26 PROCEDURE — OTHER COUNSELING: OTHER

## 2023-09-26 PROCEDURE — OTHER SUNSCREEN RECOMMENDATIONS: OTHER

## 2023-09-26 ASSESSMENT — LOCATION DETAILED DESCRIPTION DERM
LOCATION DETAILED: LEFT SUPERIOR LATERAL MIDBACK
LOCATION DETAILED: LEFT SUPERIOR UPPER BACK
LOCATION DETAILED: LEFT MEDIAL BREAST 10-11:00 REGION
LOCATION DETAILED: LEFT MEDIAL BREAST 11-12:00 REGION
LOCATION DETAILED: LEFT MEDIAL SUPERIOR CHEST

## 2023-09-26 ASSESSMENT — LOCATION SIMPLE DESCRIPTION DERM
LOCATION SIMPLE: LEFT BREAST
LOCATION SIMPLE: CHEST
LOCATION SIMPLE: LEFT LOWER BACK
LOCATION SIMPLE: LEFT UPPER BACK

## 2023-09-26 ASSESSMENT — LOCATION ZONE DERM: LOCATION ZONE: TRUNK

## 2023-09-27 ENCOUNTER — OFFICE VISIT (OUTPATIENT)
Dept: ENDOCRINOLOGY | Facility: CLINIC | Age: 68
End: 2023-09-27
Payer: MEDICARE

## 2023-09-27 VITALS
DIASTOLIC BLOOD PRESSURE: 76 MMHG | BODY MASS INDEX: 36.82 KG/M2 | WEIGHT: 195 LBS | SYSTOLIC BLOOD PRESSURE: 126 MMHG | OXYGEN SATURATION: 99 % | HEART RATE: 69 BPM | HEIGHT: 61 IN

## 2023-09-27 DIAGNOSIS — E11.65 TYPE 2 DIABETES MELLITUS WITH HYPERGLYCEMIA, WITHOUT LONG-TERM CURRENT USE OF INSULIN (CMS/HCC): Primary | ICD-10-CM

## 2023-09-27 PROCEDURE — 99214 OFFICE O/P EST MOD 30 MIN: CPT | Performed by: NURSE PRACTITIONER

## 2023-09-27 PROCEDURE — G8752 SYS BP LESS 140: HCPCS | Performed by: NURSE PRACTITIONER

## 2023-09-27 PROCEDURE — G8754 DIAS BP LESS 90: HCPCS | Performed by: NURSE PRACTITIONER

## 2023-09-27 NOTE — PROGRESS NOTES
Kailyn is a 67 y.o. female here for follow up for diabetes management.     LOV with me 3/23/2023     Events since last visit:  Reports she needs to have US of liver next week     DM2 med regimen:  Metformin 1000 mg p.o. twice daily  Acarbose 100 mg p.o. 3 times a day with meals  Victoza 1.2 mg once daily (will be in donut hole soon)       Previous meds not currently taking:  Trulicity is too expensive  Jardiance- UTI     SMBG:                Diet:   Meals: 3 meals a day  Soup and salad for lunch  Sometimes eats a snack at night  Craves sweets- still eating     On Weight watchers, lost 10#     Exercise:   10-minute walk in the morning with her dog up a hill.    Trying to lift wts 3 times a day at home  Babysit grandkids during the day     Ophthalmology:   NOV in 4/2023  Dr. Wallace  Retinopathy: None reported     Podiatry:   Self checks  Neuropathy: None  Sees podiatry every 4 months  Dr. Ware    Wt Readings from Last 3 Encounters:   09/27/23 88.5 kg (195 lb)   08/21/23 89.2 kg (196 lb 9.6 oz)   08/15/23 89.4 kg (197 lb)     Temp Readings from Last 3 Encounters:   08/15/23 36.2 °C (97.2 °F) (Temporal)   02/08/23 37 °C (98.6 °F)   08/03/22 37 °C (98.6 °F)     BP Readings from Last 3 Encounters:   09/27/23 126/76   08/21/23 120/74   08/15/23 (!) 100/57     Pulse Readings from Last 3 Encounters:   09/27/23 69   08/21/23 85   08/15/23 64     Review of Systems   Constitutional: Negative.    HENT: Negative.    Eyes: Negative.    Respiratory: Negative.    Cardiovascular: Negative.    Gastrointestinal: Negative.    Endocrine: Negative.    Genitourinary: Negative.    Musculoskeletal: Negative.    Skin: Negative.    Neurological: Negative.    Hematological: Negative.    Psychiatric/Behavioral: Negative.       Physical Exam  Vitals reviewed.   Constitutional:       Appearance: Normal appearance. She is obese.   Neck:      Comments: No thyromegaly  Cardiovascular:      Rate and Rhythm: Normal rate and regular rhythm.       "Heart sounds: Murmur heard.   Pulmonary:      Effort: Pulmonary effort is normal.      Breath sounds: Normal breath sounds.   Abdominal:      General: Bowel sounds are normal.      Palpations: Abdomen is soft.   Musculoskeletal:         General: Normal range of motion.      Cervical back: Neck supple.   Skin:     General: Skin is warm and dry.   Neurological:      General: No focal deficit present.      Mental Status: She is alert and oriented to person, place, and time.   Psychiatric:         Mood and Affect: Mood normal.     Lab Results   Component Value Date    GLUCOSE 126 (H) 09/23/2023    CALCIUM 9.8 09/23/2023     09/23/2023    K 4.5 09/23/2023    CO2 25 09/23/2023     09/23/2023    BUN 25 09/23/2023    CREATININE 0.80 09/23/2023     Lab Results   Component Value Date    CHOL 135 03/13/2023    CHOL 147 12/13/2022    CHOL 131 07/29/2022     Lab Results   Component Value Date    HDL 37 (L) 03/13/2023    HDL 42 (L) 12/13/2022    HDL 39 (L) 07/29/2022     Lab Results   Component Value Date    LDLCALC 76 03/13/2023    LDLCALC 79 12/13/2022    LDLCALC 71 07/29/2022     Lab Results   Component Value Date    TRIG 135 03/13/2023    TRIG 166 (H) 12/13/2022    TRIG 131 07/29/2022     No results found for: \"CHOLHDL\"  Lab Results   Component Value Date    HGBA1C 6.1 (H) 09/23/2023     Lab Results   Component Value Date    MICROALBUR 0.8 12/13/2022     Lab Results   Component Value Date    TSH 3.03 12/15/2020     1. Type 2 diabetes mellitus without complication, without on long term insulin use (CMS/[facility])  Glycemic control at goal: A1c 6.8%->6.1%.  Tolerating Victoza.     Recommend:  -cont Victoza 1.8 m g once day and same dose of Metformin and Acarbose  -Check BG daily  -lab in 3 months     Discussed with patient today importance of improving glycemic control in order to prevent complications to eyes, heart, kidneys, and nerves.  Reviewed an A1c goal of less than 7% is ideal in order to prevent the complications " as noted above.     Discussed safe glycemic goals and targets:   Fasting B-140.  2 hours after meals:  <180.     I discussed hypoglycemia management and treatment.  Discussed rule of 15.  Always carry candy on hand.  Do not drive car if blood sugar less than 100 or greater than 300.     Discussed importance of yearly dilated eye exam to assess for retinopathy.  Discussed importance of daily foot inspection and follow up with podiatry on routine basis.     2.  Hypertension  BP in office today at goal.  She is on quinapril 20 mg daily.  Management per PCP.     3. Dyslipidemia  LDL 76  Continue simvastatin 20 mg and fish oil  Further management per PCP.     4.  Obesity  BMI 38  Reviewed with patient today importance of following a balanced diet, monitoring carbohydrate portions, and avoiding sweetened snacks and beverages.      Reviewed with patient importance of lifestyle modification, including increasing frequency of exercise to aid in improving glycemic control.  Recommend 150 minutes of aerobic exercise a week.  She is tolerating GLP-1.        This document was created using Dragon software.  Please excuse any spelling or grammatical errors.

## 2023-10-06 DIAGNOSIS — E11.65 TYPE 2 DIABETES MELLITUS WITH HYPERGLYCEMIA, WITHOUT LONG-TERM CURRENT USE OF INSULIN (CMS/HCC): Primary | ICD-10-CM

## 2023-10-06 DIAGNOSIS — Z79.4 TYPE 2 DIABETES MELLITUS WITHOUT COMPLICATION, WITH LONG-TERM CURRENT USE OF INSULIN (CMS/HCC): ICD-10-CM

## 2023-10-06 DIAGNOSIS — E11.9 TYPE 2 DIABETES MELLITUS WITHOUT COMPLICATION, WITH LONG-TERM CURRENT USE OF INSULIN (CMS/HCC): ICD-10-CM

## 2023-10-06 RX ORDER — LIRAGLUTIDE 6 MG/ML
1.2 INJECTION SUBCUTANEOUS DAILY
Qty: 18 ML | Refills: 1 | Status: SHIPPED | OUTPATIENT
Start: 2023-10-06 | End: 2024-02-19 | Stop reason: CLARIF

## 2023-10-06 RX ORDER — SIMVASTATIN 20 MG/1
20 TABLET, FILM COATED ORAL SEE ADMIN INSTRUCTIONS
Qty: 90 TABLET | Refills: 3 | Status: SHIPPED | OUTPATIENT
Start: 2023-10-06 | End: 2024-04-02 | Stop reason: ALTCHOICE

## 2023-10-06 NOTE — TELEPHONE ENCOUNTER
Pt request refill, can you confirm dose , pt says 1.2 mg, note from 9/26 says cont. 1.8 ? Pended 1.2

## 2023-10-10 ENCOUNTER — TELEPHONE (OUTPATIENT)
Dept: SURGERY | Facility: CLINIC | Age: 68
End: 2023-10-10
Payer: MEDICARE

## 2023-10-10 NOTE — TELEPHONE ENCOUNTER
"I spoke with patient who states that having issues with bowel incontinence that occurs occasionally for at least a year. States \"Just cannot hold to get to commode\". Please advise."

## 2023-10-10 NOTE — TELEPHONE ENCOUNTER
I did a recent colonoscopy on her, but this is an issue I need to see her in the office for and do a good anorectal examination.

## 2023-10-30 ENCOUNTER — TELEPHONE (OUTPATIENT)
Dept: ENDOCRINOLOGY | Facility: CLINIC | Age: 68
End: 2023-10-30
Payer: MEDICARE

## 2023-10-30 ENCOUNTER — OFFICE VISIT (OUTPATIENT)
Dept: SURGERY | Facility: CLINIC | Age: 68
End: 2023-10-30
Payer: MEDICARE

## 2023-10-30 VITALS
WEIGHT: 193 LBS | BODY MASS INDEX: 36.44 KG/M2 | SYSTOLIC BLOOD PRESSURE: 124 MMHG | OXYGEN SATURATION: 97 % | TEMPERATURE: 98.3 F | HEIGHT: 61 IN | HEART RATE: 73 BPM | DIASTOLIC BLOOD PRESSURE: 79 MMHG

## 2023-10-30 DIAGNOSIS — R15.9 FULL INCONTINENCE OF FECES: Primary | ICD-10-CM

## 2023-10-30 PROCEDURE — 99214 OFFICE O/P EST MOD 30 MIN: CPT | Mod: 25 | Performed by: COLON & RECTAL SURGERY

## 2023-10-30 PROCEDURE — G8752 SYS BP LESS 140: HCPCS | Performed by: COLON & RECTAL SURGERY

## 2023-10-30 PROCEDURE — G8754 DIAS BP LESS 90: HCPCS | Performed by: COLON & RECTAL SURGERY

## 2023-10-30 PROCEDURE — 46600 DIAGNOSTIC ANOSCOPY SPX: CPT | Performed by: COLON & RECTAL SURGERY

## 2023-10-30 RX ORDER — AMMONIUM LACTATE 12 G/100G
LOTION TOPICAL
COMMUNITY
Start: 2023-10-20

## 2023-10-30 NOTE — ASSESSMENT & PLAN NOTE
I do not think this is an anal issue is much as a stool consistency problem.  There is a number of things that could be contributing to loose stools, including metformin therapy and coffee use.  We discussed other diet issues.  Fortunately, she is not a smoker.  I recommended trying to curb coffee use and I think it would be reasonable to try switching to different medication than metformin.  We discussed the use of Imodium as needed or Pepto-Bismol.  Either of these is fine and safe to take intermittently.  She just had a colonoscopy so endoscopic evaluation is not needed at this time.

## 2023-10-30 NOTE — LETTER
Dear Dr. Antunez,    I saw Citlali Gifford in the office today in follow-up. Please see my note below.    If you have any additional questions, please do not hesitate to call me.    Sincerely,    David Toribio MD      _____________________________________      Colorectal Surgery Follow-up    Subjective     Citlali Gifford is a 67 y.o. female who needs an evaluation of fecal incontinence.     I did a colonoscopy on 8/15/2023.  Her colon was completely normal.  We plan a 5-year follow-up because of her previous history of colon polyps.    For about the past year she has had episodes of loose stool and fecal urgency.  This tends to be worse when she is under stress.  She has had 1 episode where she has had actual fecal incontinence with stool in her underclothes.  She usually moves her bowels about once or twice a day.  She does note that she has more difficulty if she drinks coffee or tea.  She had an episode recently where she was out to breakfast with a friend and had tea, eggs, and scrappable and had difficulty getting to the bathroom.  These episodes happen about once or twice a week.  She does get improvement if she takes Pepto-Bismol including before going out to a meal.    She is on metformin for diabetes and has been for years.  She is also on Victoza injections daily and acarbose.    She has 3 children.  Her first 2 were delivered vaginally.  She believes that she had a tear with her first child.    Medical History:   Past Medical History:   Diagnosis Date    Anxiety     Aortic stenosis     Claustrophobia     Elevated liver enzymes     dx-  fatty liver    Hypertension     Impaired hearing     bilateral - no hearing aids    Lipid disorder     Numbness     left foot- secondary to previous  back injury     Postmenopausal     Sleep apnea     compliant with CPAP- nightly    Tendonitis     left shoulder - treatment with PT    Type 2 diabetes mellitus (CMS/HCC)     Vertigo     ongoing-  "intermittent with position changes- treatment with meclizine and nasal spray       Surgical History:   Past Surgical History:   Procedure Laterality Date     SECTION      CHOLECYSTECTOMY      DILATION AND EVACUATION      ENDOMETRIAL ABLATION      GALLBLADDER SURGERY      HERNIA REPAIR      HYSTERECTOMY      KNEE ARTHROSCOPY Right     repair torn meniscus    TONSILLECTOMY         Allergies: Amoxicillin-pot clavulanate, Sulfa (sulfonamide antibiotics), and Wellbutrin [bupropion hcl]    Current Medications:    acarbose    ammonium lactate    b complex vitamins    BD ENRIQUETA 2ND GEN PEN NEEDLE    ONETOUCH VERIO TEST STRIPS    blood-glucose meter    cholecalciferol (vitamin D3)    estradioL    folic acid    lancets    VICTOZA 3-AZAM    lisinopriL    metFORMIN    omega-3 fatty acids-fish oil    omeprazole    quinapriL    resvera/chrom/gr.tea/EGCG/dig3 (RESVERATROL DIET ORAL)    sertraline    simvastatin    zinc sulfate    Objective     Physicial Exam  Visit Vitals  /79 (BP Location: Left upper arm, Patient Position: Sitting)   Pulse 73   Temp 36.8 °C (98.3 °F) (Oral)   Ht 1.549 m (5' 1\")   Wt 87.5 kg (193 lb)   SpO2 97%   BMI 36.47 kg/m²       Physical Exam  Vitals reviewed.   Constitutional:       Appearance: Normal appearance. She is well-developed. She is obese.   HENT:      Head: Normocephalic and atraumatic.   Eyes:      Pupils: Pupils are equal, round, and reactive to light.   Cardiovascular:      Rate and Rhythm: Normal rate and regular rhythm.      Heart sounds: Normal heart sounds. No murmur heard.  Pulmonary:      Effort: Pulmonary effort is normal. No respiratory distress.      Breath sounds: Normal breath sounds. No wheezing.   Abdominal:      General: There is no distension.      Palpations: Abdomen is soft. There is no mass.      Tenderness: There is no abdominal tenderness.      Hernia: No hernia is present.   Genitourinary:     Comments: Her perineal body is " maintained fairly normally.  There appears to be some scarring at the posterior vaginal fourchette.  On digital examination her resting anal tone is moderate.  She has good circumferential squeeze function.  No palpable masses.  No impaction.  On anoscopy, there is no sign of any internal hemorrhoidal prolapse.  The rectal mucosa looks healthy.  Musculoskeletal:         General: No tenderness. Normal range of motion.      Cervical back: Normal range of motion and neck supple.   Lymphadenopathy:      Cervical: No cervical adenopathy.   Skin:     General: Skin is warm and dry.      Capillary Refill: Capillary refill takes less than 2 seconds.      Findings: No rash.   Neurological:      Mental Status: She is alert and oriented to person, place, and time.      Cranial Nerves: No cranial nerve deficit.   Psychiatric:         Mood and Affect: Mood normal.         Behavior: Behavior normal.             Assessment     Problem List Items Addressed This Visit        Digestive    Full incontinence of feces - Primary    Current Assessment & Plan     I do not think this is an anal issue is much as a stool consistency problem.  There is a number of things that could be contributing to loose stools, including metformin therapy and coffee use.  We discussed other diet issues.  Fortunately, she is not a smoker.  I recommended trying to curb coffee use and I think it would be reasonable to try switching to different medication than metformin.  We discussed the use of Imodium as needed or Pepto-Bismol.  Either of these is fine and safe to take intermittently.  She just had a colonoscopy so endoscopic evaluation is not needed at this time.                 Liam Toribio MD

## 2023-10-30 NOTE — PROGRESS NOTES
Colorectal Surgery Follow-up    Subjective     Citlali Gifford is a 67 y.o. female who needs an evaluation of fecal incontinence.     I did a colonoscopy on 8/15/2023.  Her colon was completely normal.  We plan a 5-year follow-up because of her previous history of colon polyps.    For about the past year she has had episodes of loose stool and fecal urgency.  This tends to be worse when she is under stress.  She has had 1 episode where she has had actual fecal incontinence with stool in her underclothes.  She usually moves her bowels about once or twice a day.  She does note that she has more difficulty if she drinks coffee or tea.  She had an episode recently where she was out to breakfast with a friend and had tea, eggs, and scrappable and had difficulty getting to the bathroom.  These episodes happen about once or twice a week.  She does get improvement if she takes Pepto-Bismol including before going out to a meal.    She is on metformin for diabetes and has been for years.  She is also on Victoza injections daily and acarbose.    She has 3 children.  Her first 2 were delivered vaginally.  She believes that she had a tear with her first child.    Medical History:   Past Medical History:   Diagnosis Date    Anxiety     Aortic stenosis     Claustrophobia     Elevated liver enzymes     dx-  fatty liver    Hypertension     Impaired hearing     bilateral - no hearing aids    Lipid disorder     Numbness     left foot- secondary to previous  back injury     Postmenopausal     Sleep apnea     compliant with CPAP- nightly    Tendonitis     left shoulder - treatment with PT    Type 2 diabetes mellitus (CMS/HCC)     Vertigo     ongoing- intermittent with position changes- treatment with meclizine and nasal spray       Surgical History:   Past Surgical History:   Procedure Laterality Date     SECTION      CHOLECYSTECTOMY      DILATION AND EVACUATION      ENDOMETRIAL ABLATION      GALLBLADDER  "SURGERY      HERNIA REPAIR      HYSTERECTOMY      KNEE ARTHROSCOPY Right     repair torn meniscus    TONSILLECTOMY         Allergies: Amoxicillin-pot clavulanate, Sulfa (sulfonamide antibiotics), and Wellbutrin [bupropion hcl]    Current Medications:    acarbose    ammonium lactate    b complex vitamins    BD ENRIQUETA 2ND GEN PEN NEEDLE    ONETOUCH VERIO TEST STRIPS    blood-glucose meter    cholecalciferol (vitamin D3)    estradioL    folic acid    lancets    VICTOZA 3-AZAM    lisinopriL    metFORMIN    omega-3 fatty acids-fish oil    omeprazole    quinapriL    resvera/chrom/gr.tea/EGCG/dig3 (RESVERATROL DIET ORAL)    sertraline    simvastatin    zinc sulfate    Objective     Physicial Exam  Visit Vitals  /79 (BP Location: Left upper arm, Patient Position: Sitting)   Pulse 73   Temp 36.8 °C (98.3 °F) (Oral)   Ht 1.549 m (5' 1\")   Wt 87.5 kg (193 lb)   SpO2 97%   BMI 36.47 kg/m²       Physical Exam  Vitals reviewed.   Constitutional:       Appearance: Normal appearance. She is well-developed. She is obese.   HENT:      Head: Normocephalic and atraumatic.   Eyes:      Pupils: Pupils are equal, round, and reactive to light.   Cardiovascular:      Rate and Rhythm: Normal rate and regular rhythm.      Heart sounds: Normal heart sounds. No murmur heard.  Pulmonary:      Effort: Pulmonary effort is normal. No respiratory distress.      Breath sounds: Normal breath sounds. No wheezing.   Abdominal:      General: There is no distension.      Palpations: Abdomen is soft. There is no mass.      Tenderness: There is no abdominal tenderness.      Hernia: No hernia is present.   Genitourinary:     Comments: Her perineal body is maintained fairly normally.  There appears to be some scarring at the posterior vaginal fourchette.  On digital examination her resting anal tone is moderate.  She has good circumferential squeeze function.  No palpable masses.  No impaction.  On anoscopy, there is no sign of any " internal hemorrhoidal prolapse.  The rectal mucosa looks healthy.  Musculoskeletal:         General: No tenderness. Normal range of motion.      Cervical back: Normal range of motion and neck supple.   Lymphadenopathy:      Cervical: No cervical adenopathy.   Skin:     General: Skin is warm and dry.      Capillary Refill: Capillary refill takes less than 2 seconds.      Findings: No rash.   Neurological:      Mental Status: She is alert and oriented to person, place, and time.      Cranial Nerves: No cranial nerve deficit.   Psychiatric:         Mood and Affect: Mood normal.         Behavior: Behavior normal.             Assessment     Problem List Items Addressed This Visit        Digestive    Full incontinence of feces - Primary    Current Assessment & Plan     I do not think this is an anal issue is much as a stool consistency problem.  There is a number of things that could be contributing to loose stools, including metformin therapy and coffee use.  We discussed other diet issues.  Fortunately, she is not a smoker.  I recommended trying to curb coffee use and I think it would be reasonable to try switching to different medication than metformin.  We discussed the use of Imodium as needed or Pepto-Bismol.  Either of these is fine and safe to take intermittently.  She just had a colonoscopy so endoscopic evaluation is not needed at this time.                  Liam Toribio MD

## 2023-10-30 NOTE — PATIENT INSTRUCTIONS
You may use Imodium or Pepto-Bismol as needed.  Discuss stopping metformin to see if that helps.  I would recommend trying to avoid coffee.

## 2023-10-31 NOTE — TELEPHONE ENCOUNTER
[place], I received a note from Dr. Toribio, stating the pt was experiencing worsening fecal incontinence. Please call pt and ask her to stop Metformin. I would like to see if that med is the cause. Please ask her to let us know if her s/s are unchanged even after stopping the medication. Thanks!

## 2023-11-07 ENCOUNTER — TRANSCRIBE ORDERS (OUTPATIENT)
Dept: SCHEDULING | Age: 68
End: 2023-11-07

## 2023-11-07 DIAGNOSIS — M85.80 OTHER SPECIFIED DISORDERS OF BONE DENSITY AND STRUCTURE, UNSPECIFIED SITE: ICD-10-CM

## 2023-11-07 DIAGNOSIS — M81.8 OTHER OSTEOPOROSIS WITHOUT CURRENT PATHOLOGICAL FRACTURE: Primary | ICD-10-CM

## 2023-11-07 DIAGNOSIS — M81.0 AGE-RELATED OSTEOPOROSIS WITHOUT CURRENT PATHOLOGICAL FRACTURE: ICD-10-CM

## 2023-11-16 ENCOUNTER — OFFICE VISIT (OUTPATIENT)
Dept: OBSTETRICS AND GYNECOLOGY | Facility: CLINIC | Age: 68
End: 2023-11-16
Payer: MEDICARE

## 2023-11-16 ENCOUNTER — HOSPITAL ENCOUNTER (OUTPATIENT)
Dept: RADIOLOGY | Age: 68
Discharge: HOME | End: 2023-11-16
Attending: FAMILY MEDICINE
Payer: MEDICARE

## 2023-11-16 VITALS
WEIGHT: 192.8 LBS | DIASTOLIC BLOOD PRESSURE: 78 MMHG | SYSTOLIC BLOOD PRESSURE: 130 MMHG | BODY MASS INDEX: 36.4 KG/M2 | HEIGHT: 61 IN

## 2023-11-16 DIAGNOSIS — Z12.4 ROUTINE CERVICAL SMEAR: ICD-10-CM

## 2023-11-16 DIAGNOSIS — Z12.31 ENCOUNTER FOR SCREENING MAMMOGRAM FOR MALIGNANT NEOPLASM OF BREAST: Primary | ICD-10-CM

## 2023-11-16 DIAGNOSIS — M81.8 OTHER OSTEOPOROSIS WITHOUT CURRENT PATHOLOGICAL FRACTURE: ICD-10-CM

## 2023-11-16 PROCEDURE — 77080 DXA BONE DENSITY AXIAL: CPT

## 2023-11-16 PROCEDURE — G0101 CA SCREEN;PELVIC/BREAST EXAM: HCPCS | Performed by: OBSTETRICS & GYNECOLOGY

## 2023-11-16 NOTE — PROGRESS NOTES
Kailyn is seen today 68 years old she is fully retired has a 37-year-old son 31-year-old daughter and a 28-year-old daughter she has 4 grandkids 4 years old/2 years old/2 years old she does a lot of full-time babysitting    Moods on Zoloft 100 mg    Urine GI noncontributory    Heart lungs noncontributory    Oncologic a total abdominal hysterectomy bilateral salpingo-oophorectomy for ovarian steatosis    Mammogram November 2023 category B    Colonoscopy August 2023    Labs were in September 2023 she is on Victoza for diabetes hemoglobin A1c of 6.1 she also takes medications for diabetes her cholesterol looks excellent    Getting a DEXA tonight    Pap test not needed    Her neck was supple no masses    Abdomen soft nontender    Breast exam showed no masses    Vaginal vault well supported uterus adnexa surgically missing    Normal GYN checkup plan as above

## 2024-01-04 ENCOUNTER — TELEPHONE (OUTPATIENT)
Dept: ENDOCRINOLOGY | Facility: CLINIC | Age: 69
End: 2024-01-04
Payer: MEDICARE

## 2024-01-04 DIAGNOSIS — E11.65 TYPE 2 DIABETES MELLITUS WITH HYPERGLYCEMIA, WITHOUT LONG-TERM CURRENT USE OF INSULIN (CMS/HCC): Primary | ICD-10-CM

## 2024-01-04 RX ORDER — BLOOD SUGAR DIAGNOSTIC
STRIP MISCELLANEOUS
Qty: 100 STRIP | Refills: 3 | Status: SHIPPED | OUTPATIENT
Start: 2024-01-04

## 2024-01-05 ENCOUNTER — TELEPHONE (OUTPATIENT)
Dept: ENDOCRINOLOGY | Facility: CLINIC | Age: 69
End: 2024-01-05

## 2024-01-05 NOTE — TELEPHONE ENCOUNTER
Attempted to call pt back per voicemail [email or web address]      Pt states she has been trying to call us all morning. Needs refill      My call was to inform the patient that it has been taken care of yesterday.        When calling pt picked up the line and I could hear a lot of shuffling sounds and movements but no voices. Pt hung up.

## 2024-01-18 ENCOUNTER — TRANSCRIBE ORDERS (OUTPATIENT)
Dept: SCHEDULING | Age: 69
End: 2024-01-18

## 2024-01-18 DIAGNOSIS — M54.16 RADICULOPATHY, LUMBAR REGION: ICD-10-CM

## 2024-01-18 DIAGNOSIS — M51.26 OTHER INTERVERTEBRAL DISC DISPLACEMENT, LUMBAR REGION: ICD-10-CM

## 2024-01-18 DIAGNOSIS — M47.817 SPONDYLOSIS WITHOUT MYELOPATHY OR RADICULOPATHY, LUMBOSACRAL REGION: Primary | ICD-10-CM

## 2024-01-19 ENCOUNTER — TELEPHONE (OUTPATIENT)
Dept: SCHEDULING | Facility: CLINIC | Age: 69
End: 2024-01-19
Payer: MEDICARE

## 2024-01-19 NOTE — TELEPHONE ENCOUNTER
Pt called can asked for her echo and OV to be changed     Pt is requesting and 8 am appt for the echo and the latest appt for the OV on the same day     Pt can be reached at [phone]

## 2024-01-23 ENCOUNTER — TELEPHONE (OUTPATIENT)
Dept: ENDOCRINOLOGY | Facility: CLINIC | Age: 69
End: 2024-01-23
Payer: MEDICARE

## 2024-01-23 NOTE — TELEPHONE ENCOUNTER
Will await pts call back with information.    The patient is a 88y Female complaining of abdominal pain.

## 2024-01-23 NOTE — TELEPHONE ENCOUNTER
Victoza no longer covered under her insurance.     They either need a prior Auth or a different Rx.     Advised pt to call the insurance company and ask what is on her formulary.     If we do prior Auth the pharmacy needs to fax us a form.

## 2024-01-30 DIAGNOSIS — E11.9 TYPE 2 DIABETES MELLITUS WITHOUT COMPLICATION, WITHOUT LONG-TERM CURRENT USE OF INSULIN (CMS/HCC): ICD-10-CM

## 2024-01-30 NOTE — TELEPHONE ENCOUNTER
Patient called in about Victoza being discontinued by the [employer].    They will only continue to cover if Ruma can't stat that basically the patient is deemed necessary to be on Victoza.

## 2024-02-07 ENCOUNTER — HOSPITAL ENCOUNTER (OUTPATIENT)
Dept: CARDIOLOGY | Facility: CLINIC | Age: 69
Discharge: HOME | End: 2024-02-07
Attending: INTERNAL MEDICINE
Payer: MEDICARE

## 2024-02-07 VITALS
HEIGHT: 61 IN | WEIGHT: 191.8 LBS | DIASTOLIC BLOOD PRESSURE: 67 MMHG | BODY MASS INDEX: 36.21 KG/M2 | SYSTOLIC BLOOD PRESSURE: 129 MMHG

## 2024-02-07 DIAGNOSIS — I38 VALVULAR HEART DISEASE: ICD-10-CM

## 2024-02-07 PROCEDURE — 93306 TTE W/DOPPLER COMPLETE: CPT | Performed by: INTERNAL MEDICINE

## 2024-02-08 LAB
AORTIC ROOT ANNULUS: 2.88 CM
AORTIC VALVE AT: 92 MS
AORTIC VALVE MEAN VELOCITY: 2.63 M/S
AORTIC VALVE VELOCITY TIME INTEGRAL: 75.4 CM
ASCENDING AORTA: 3.75 CM
AV MEAN GRADIENT: 32 MMHG
AV PEAK GRADIENT: 59 MMHG
AV PEAK VELOCITY-S: 3.84 M/S
AV VALVE AREA INDEX: 0.54
AV VALVE AREA: 0.89 CM2
AV VELOCITY RATIO: 0.34
AVA (VTI): 1.04 CM2
BSA FOR ECHO PROCEDURE: 1.93 M2
CUSP SEPARATION: 1 CM
DOP CALC LVOT STROKE VOLUME: 78.48 CM3
E WAVE DECELERATION TIME: 373 MS
E/A RATIO: 0.7
E/E' RATIO: 8.3
E/LAT E' RATIO: 8.4
EDV (BP): 68 CM3
EF (A4C): 77.7 %
EF A2C: 78.4 %
EJECTION FRACTION: 78.2 %
EST RIGHT VENT SYSTOLIC PRESSURE BY TRICUSPID REGURGITATION JET: 23 MMHG
ESV (BP): 14.8 CM3
FRACTIONAL SHORTENING: 39.24 %
INTERVENTRICULAR SEPTUM: 1.26 CM
LA ESV (BP): 77.6 CM3
LA ESV INDEX (A2C): 43.11 CM3/M2
LA ESV INDEX (BP): 40.21 CM3/M2
LA/AORTA RATIO: 1.24
LAAS-AP2: 24.1 CM2
LAAS-AP4: 21.7 CM2
LAD 2D: 3.57 CM
LALD A4C: 5.29 CM
LALD A4C: 5.51 CM
LAV-S: 83.2 CM3
LEFT ATRIUM VOLUME INDEX: 36.58 CM3/M2
LEFT ATRIUM VOLUME: 70.6 CM3
LEFT INTERNAL DIMENSION IN SYSTOLE: 2.88 CM (ref 2.6–3.93)
LEFT VENTRICLE DIASTOLIC VOLUME INDEX: 35.13 CM3/M2
LEFT VENTRICLE DIASTOLIC VOLUME: 67.8 CM3
LEFT VENTRICLE SYSTOLIC VOLUME INDEX: 7.82 CM3/M2
LEFT VENTRICLE SYSTOLIC VOLUME: 15.1 CM3
LEFT VENTRICULAR INTERNAL DIMENSION IN DIASTOLE: 4.74 CM (ref 4.39–6.1)
LEFT VENTRICULAR POSTERIOR WALL IN END DIASTOLE: 1.17 CM (ref 0.58–1.07)
LV DIASTOLIC VOLUME: 65.2 CM3
LV ESV (APICAL 2 CHAMBER): 14 CM3
LVAD-AP2: 25 CM2
LVAD-AP4: 25.1 CM2
LVAS-AP2: 9.31 CM2
LVAS-AP4: 9.86 CM2
LVEDVI(A2C): 33.78 CM3/M2
LVEDVI(BP): 35.23 CM3/M2
LVESVI(A2C): 7.25 CM3/M2
LVESVI(BP): 7.67 CM3/M2
LVLD-AP2: 8.08 CM
LVLD-AP4: 7.7 CM
LVLS-AP2: 6 CM
LVLS-AP4: 6.27 CM
LVOT 2D: 1.98 CM
LVOT A: 3.08 CM2
LVOT MG: 4 MMHG
LVOT MV: 0.96 M/S
LVOT PEAK VELOCITY: 1.42 M/S
LVOT PG: 8 MMHG
LVOT STROKE VOLUME INDEX: 40.66 ML/M2
LVOT VTI: 25.5 CM
MLH CV ECHO AVA INDEX VELOCITY RATIO: 0.5
MV E'TISSUE VEL-LAT: 0.06 M/S
MV E'TISSUE VEL-MED: 0.07 M/S
MV PEAK A VEL: 0.81 M/S
MV PEAK E VEL: 0.54 M/S
MV STENOSIS PRESSURE HALF TIME: 109 MS
MV VALVE AREA P 1/2 METHOD: 2.02 CM2
POSTERIOR WALL: 1.17 CM
PV PEAK GRADIENT: 10 MMHG
PV PV: 1.58 M/S
RAP: 5 MMHG
RVOT VMAX: 0.96 M/S
SEPTAL TISSUE DOPPLER FREE WALL LATE DIA VELOCITY (APICAL 4 CHAMBER VIEW): 0.13 M/S
TR MAX PG: 17.81 MMHG
TRICUSPID VALVE PEAK REGURGITATION VELOCITY: 2.11 M/S
Z-SCORE OF LEFT VENTRICULAR DIMENSION IN END DIASTOLE: -0.88
Z-SCORE OF LEFT VENTRICULAR DIMENSION IN END SYSTOLE: -0.82
Z-SCORE OF LEFT VENTRICULAR POSTERIOR WALL IN END DIASTOLE: 2.08

## 2024-02-10 ENCOUNTER — HOSPITAL ENCOUNTER (OUTPATIENT)
Dept: RADIOLOGY | Age: 69
Discharge: HOME | End: 2024-02-10
Attending: FAMILY MEDICINE
Payer: MEDICARE

## 2024-02-10 DIAGNOSIS — M51.26 OTHER INTERVERTEBRAL DISC DISPLACEMENT, LUMBAR REGION: ICD-10-CM

## 2024-02-10 DIAGNOSIS — M54.16 RADICULOPATHY, LUMBAR REGION: ICD-10-CM

## 2024-02-10 DIAGNOSIS — M47.817 SPONDYLOSIS WITHOUT MYELOPATHY OR RADICULOPATHY, LUMBOSACRAL REGION: ICD-10-CM

## 2024-02-13 RX ORDER — METFORMIN HYDROCHLORIDE 500 MG/1
1000 TABLET ORAL 2 TIMES DAILY WITH MEALS
Qty: 360 TABLET | Refills: 3 | Status: SHIPPED | OUTPATIENT
Start: 2024-02-13 | End: 2025-02-24

## 2024-02-15 ENCOUNTER — TELEPHONE (OUTPATIENT)
Dept: ENDOCRINOLOGY | Facility: CLINIC | Age: 69
End: 2024-02-15
Payer: MEDICARE

## 2024-02-15 DIAGNOSIS — E11.65 TYPE 2 DIABETES MELLITUS WITH HYPERGLYCEMIA, WITHOUT LONG-TERM CURRENT USE OF INSULIN (CMS/HCC): Primary | ICD-10-CM

## 2024-02-15 NOTE — TELEPHONE ENCOUNTER
Patient called in stating that insurance will no longer will cover the Victoza, patient did contact insurance and provided an alternative and is the following: Trulicity and/or Ozempic    Patient stated that Trulicity will be best due to patient was on it first and might need a prior authorization and will go through easy.    Pharmacy: [facility]    Patient running out of the Victoza    Please Advised

## 2024-02-19 RX ORDER — DULAGLUTIDE 1.5 MG/.5ML
1.5 INJECTION, SOLUTION SUBCUTANEOUS WEEKLY
Qty: 2 ML | Refills: 3 | Status: SHIPPED | OUTPATIENT
Start: 2024-02-19 | End: 2024-02-27 | Stop reason: CLARIF

## 2024-02-19 NOTE — TELEPHONE ENCOUNTER
"NOV 3/25/24  LOV9/27/23    DM2 med regimen:  Metformin 1000 mg p.o. twice daily  Acarbose 100 mg p.o. 3 times a day with meals  Victoza 1.2 mg once daily (will be in donut hole soon)       Previous meds not currently taking:  Trulicity is too expensive  Jardiance- UTI    Per insurance now the Trulicity is covered per pt, previously the medication was \"too expensive\" but pt insists this is the medication she wants.       Per Dr. Mckeon order Trulicity 1.5mg        "

## 2024-02-19 NOTE — TELEPHONE ENCOUNTER
I received a message from the PSR that pt called for refill on meds.  She did inform the pt that the refills will be sent in the order received.     I called the pt she still has 1 victoza pen left.  I assured her that the refill will be sent to her pharmacy. She then told me that she called Lisbeth Velasco.

## 2024-02-19 NOTE — TELEPHONE ENCOUNTER
Patient called again stating that has one dose of medication and that called last week regarding that needs a refill but insurance will not cover the initial Rx.    Explained that the message was submitted to the clinical and we are working on it as fasted we can. Provider not available but the provider who is covering will be taking care of it. I explained to to patient that we do work with all refills and prior authorizations in order as is received. We have one nurse and at time there are emergency they do arise. Patient was not happy that she called and it should of been taking care of. Not acceptable. Wanted to speak with manager, I advised patient that I will pass the message that unfortunately my manager was not available today.     Patient advise that will call hospital due to not able to speak with manager.

## 2024-02-20 NOTE — RESULT ENCOUNTER NOTE
Initial visit with patient.  Visited with patient to assess for spiritual and emotional needs.  Pt was doing good at the time of my visit but did take time to share problems he has had over the last few days.  Pt asked for prayer and I took time to do this for him.  Spiritual care remains available as needed.    Chaplain Mohsen Klein M.Div., [facility]   Reviewed, will discuss in detail with patient during upcoming visit on     9/16/2021

## 2024-02-26 ENCOUNTER — TELEPHONE (OUTPATIENT)
Dept: ENDOCRINOLOGY | Facility: CLINIC | Age: 69
End: 2024-02-26
Payer: MEDICARE

## 2024-02-26 NOTE — TELEPHONE ENCOUNTER
Patient called requesting the Ozempic to be submitted [facility] in [place] #[phone]    Per patient they have it there    Please advise

## 2024-02-27 RX ORDER — SEMAGLUTIDE 0.68 MG/ML
0.5 INJECTION, SOLUTION SUBCUTANEOUS
Qty: 3 ML | Refills: 0 | Status: SHIPPED | OUTPATIENT
Start: 2024-02-27 | End: 2024-03-25 | Stop reason: SDUPTHER

## 2024-02-27 NOTE — TELEPHONE ENCOUNTER
Pt called in again and needs Ozempic sent to [facility] in [place] [phone]    Please assist.

## 2024-02-27 NOTE — TELEPHONE ENCOUNTER
Pt is now requesting Ozempic as she can not get Trulicity. Pt was previously on VIctozia 1.2mg,     What dose of Ozempic would the pt need coming from 1.2mg of vicotza. Can you send the ozempic to the [facility] in [place]

## 2024-03-21 ENCOUNTER — TELEPHONE (OUTPATIENT)
Dept: ENDOCRINOLOGY | Facility: CLINIC | Age: 69
End: 2024-03-21
Payer: MEDICARE

## 2024-03-21 NOTE — PROGRESS NOTES
Cardiology  Office Progress Note       Reason for visit:   Chief Complaint   Patient presents with    Follow-up     HPI     Citlali Gifford is a 68 y.o. female who presents to the office for cardiovascular follow up and management of hypertension, dyslipidemia, aortic stenosis and mitral regurgitation.     She was last seen in the office on 23. At that visit, she reported feeling well and offered no complaints. She started WW 2 weeks prior to her appointment an was down 7 lbs. She was active watching her grandchildren 5 days a week. She denied any cardiac complaints.     I ordered her an echo and fasting labs and made no changes to her current regimen.    Echo 24      Labs 24 - , , HDL 47, LDL 80    Today she reports feeling well. She followed up with TERESA Del Rio on 3/25 and was ordered repeat labs and a 6 month follow up. She has remained active at home caring for her grandchildren. She has stopped WW. She has lost an additional 3 lbs. She has been tolerating her medications without any adverse reactions. She denies any chest pain, shortness of breath, palpitations, lightheadedness, dizziness or syncopal events.     Past Medical History:   Diagnosis Date    Anxiety     Aortic stenosis     Claustrophobia     Elevated liver enzymes     dx-  fatty liver    Hypertension     Impaired hearing     bilateral - no hearing aids    Lipid disorder     Numbness     left foot- secondary to previous  back injury     Postmenopausal     Sleep apnea     compliant with CPAP- nightly    Tendonitis     left shoulder - treatment with PT    Type 2 diabetes mellitus (CMS/HCC)     Vertigo     ongoing- intermittent with position changes- treatment with meclizine and nasal spray     Past Surgical History:   Procedure Laterality Date     SECTION      CHOLECYSTECTOMY      DILATION AND EVACUATION      ENDOMETRIAL ABLATION      GALLBLADDER SURGERY      HERNIA REPAIR      HYSTERECTOMY      KNEE  "ARTHROSCOPY Right     repair torn meniscus    TONSILLECTOMY       Social History     Tobacco Use    Smoking status: Never    Smokeless tobacco: Never   Vaping Use    Vaping Use: Never used   Substance Use Topics    Alcohol use: Not Currently     Comment: ocasional    Drug use: Never     Family History   Problem Relation Age of Onset    Hypertension Biological Mother     Diabetes Biological Mother     Heart attack Biological Father     No Known Problems Biological Sister     Hypertension Biological Brother     Diabetes Maternal Grandmother     Colon cancer Paternal Grandfather     No Known Problems Biological Daughter     Hyperlipidemia Biological Daughter     No Known Problems Biological Son      Allergies:  Amoxicillin-pot clavulanate, Sulfa (sulfonamide antibiotics), and Wellbutrin [bupropion hcl]    Current Outpatient Medications   Medication Sig Dispense Refill    acarbose (PRECOSE) 100 mg tablet TAKE 1 TAB ORAL 3 X DAILY AT START OF EACH MEAL 3 TABS DAILY 270 tablet 3    ammonium lactate (LAC-HYDRIN) 12 % lotion APPLY DAILY TO FEET      atorvastatin (LIPITOR) 20 mg tablet Take 1 tablet (20 mg total) by mouth nightly. 30 tablet 3    b complex vitamins tablet daily.      BD ENRIQUETA 2ND GEN PEN NEEDLE 32 gauge x 5/32\" needle USE FOR DAILY INJECTION 100 each 3    blood sugar diagnostic (CONTOUR NEXT TEST STRIPS) strip Test glucose once daily 100 strip 3    blood sugar diagnostic (ONETOUCH VERIO TEST STRIPS) strip TEST 3 TIMES A  strip 7    blood-glucose meter (ONETOUCH VERIO FLEX METER) misc CHECK GLUCOSE 3 TIMES ADAY 300 each 3    cholecalciferol, vitamin D3, 25 mcg (1,000 unit) capsule One tablet daily      folic acid (FOLVITE) 1 mg tablet Take 1 mg by mouth daily.      lancets (ONETOUCH DELICA LANCETS) 33 gauge misc Check glucose 3 times aday 200 each 3    lisinopriL (PRINIVIL) 20 mg tablet Take 20 mg by mouth daily.      metFORMIN (GLUCOPHAGE) 500 mg tablet Take 2 tablets (1,000 mg total) by mouth 2 (two) " times a day with meals. 360 tablet 3    omega-3 fatty acids-fish oil 300-1,000 mg capsule take 1 capsule daily      omeprazole (PriLOSEC) 20 mg capsule Take 20 mg by mouth daily before breakfast.      resvera/chrom/gr.tea/EGCG/dig3 (RESVERATROL DIET ORAL) Take by mouth.      semaglutide (OZEMPIC) 0.25 mg or 0.5 mg (2 mg/3 mL) subcutaneous injection Inject 0.5 mg under the skin every (seven) 7 days. 6 mL 0    sertraline (ZOLOFT) 100 mg tablet Take 100 mg by mouth every evening.  2    verapamil SR (CALAN-SR) 120 mg CR tablet Take 1 tablet (120 mg total) by mouth nightly. 30 tablet 3    zinc sulfate (ZINCATE) 220 (50) mg capsule Take 220 mg by mouth daily.      quinapriL (ACCUPRIL) 20 mg tablet Take 20 mg by mouth daily.   180 tablet 3     No current facility-administered medications for this visit.     Review of Systems   Constitutional: Positive for weight loss (- 3 lbs since 8/21/23). Negative for malaise/fatigue.   Cardiovascular:  Negative for chest pain, dyspnea on exertion, irregular heartbeat, leg swelling, near-syncope, orthopnea, palpitations and syncope.   Hematologic/Lymphatic: Does not bruise/bleed easily.   Neurological:  Negative for dizziness and light-headedness.   All other systems reviewed and are negative.    Objective     Vitals:    04/02/24 0807   BP: 124/70   Pulse: 75   SpO2: 99%     Wt Readings from Last 5 Encounters:   04/02/24 87.5 kg (193 lb)   03/25/24 87.5 kg (193 lb)   02/07/24 87 kg (191 lb 12.8 oz)   11/16/23 87.5 kg (192 lb 12.8 oz)   10/30/23 87.5 kg (193 lb)     Body mass index is 36.47 kg/m².    Physical Exam  Vitals and nursing note reviewed.   Constitutional:       Appearance: Normal appearance. She is well-developed.   HENT:      Head: Normocephalic and atraumatic.   Eyes:      General: No scleral icterus.  Neck:      Vascular: Carotid bruit present. No JVD.   Cardiovascular:      Rate and Rhythm: Normal rate and regular rhythm.      Pulses: Normal pulses and intact distal  pulses.           Carotid pulses are 2+ on the right side with bruit and 2+ on the left side with bruit.       Radial pulses are 2+ on the right side and 2+ on the left side.        Dorsalis pedis pulses are 2+ on the right side and 2+ on the left side.        Posterior tibial pulses are 2+ on the right side and 2+ on the left side.      Heart sounds: S1 normal. Murmur (III/VI RADHA at base that radiates towards bilateral carotids. Diminished S2.) heard.      No friction rub. No gallop. No S3 or S4 sounds.   Pulmonary:      Effort: Pulmonary effort is normal. No respiratory distress.      Breath sounds: Normal breath sounds. No stridor. No wheezing, rhonchi or rales.   Chest:      Chest wall: No tenderness.   Abdominal:      General: Bowel sounds are normal. There is no distension.      Palpations: Abdomen is soft. There is no mass.      Tenderness: There is no abdominal tenderness. There is no guarding or rebound.   Musculoskeletal:      Right lower leg: No edema.      Left lower leg: No edema.   Skin:     General: Skin is warm and dry.      Coloration: Skin is not pale.      Findings: No erythema or rash.   Neurological:      General: No focal deficit present.      Mental Status: She is alert and oriented to person, place, and time.   Psychiatric:         Mood and Affect: Mood normal.         Behavior: Behavior normal.         Thought Content: Thought content normal.         Judgment: Judgment normal.       ECG   sinus rhythm, I have independently reviewed the patient's ECG results.  Significant abnormals are :.  Normal sinus rhythm 70bpm  Possible Left atrial enlargement  Septal infarct (cited on or before 21-AUG-2023)  T wave abnormality, consider inferolateral ischemia  Abnormal ECG  When compared with ECG of 21-AUG-2023 08:11,  T wave inversion now evident in Inferior leads  T wave inversion now evident in Anterior leads  Confirmed by Alvaro Cooley ([id number]) on 4/2/2024 8:30:49 AM     Hematology  Lab Results    Component Value Date    WBC 6.7 12/15/2020    HGB 14.4 12/15/2020    HCT 43.9 12/15/2020     12/15/2020    INR 1.0 12/05/2016       Chemistries  Lab Results   Component Value Date     09/23/2023    K 4.5 09/23/2023     09/23/2023    CREATININE 0.80 09/23/2023    BUN 25 09/23/2023    CO2 25 09/23/2023    GLUCOSE 126 (H) 09/23/2023    CALCIUM 9.8 09/23/2023    MG 2.2 12/05/2016    ALT 97 (H) 09/09/2021    AST 60 (H) 09/09/2021     Cholesterol  Lab Results   Component Value Date    CHOL 135 03/13/2023    TRIG 135 03/13/2023    HDL 37 (L) 03/13/2023    LDLCALC 76 03/13/2023     Endocrine  Lab Results   Component Value Date    TSH 3.03 12/15/2020    FREET4 0.86 10/26/2015    HGBA1C 6.1 (H) 09/23/2023     Cardiac Imaging    TRANSTHORACIC ECHO (TTE) COMPLETE 02/07/2024    Interpretation Summary    Left Ventricle: Normal ventricle size. Mild concentric left ventricular hypertrophy. Moderate mid-cavity obstruction present. Resting gradient 32mmHg that increases to 66mmHg with valsalva. Hyperdynamic systolic function. Estimated EF 75%. Wall motion appears grossly normal. Normal septal motion. Grade I diastolic dysfunction. Diastolic inflow pattern consistent with impaired relaxation.    Left Atrium: Moderately dilated atrium.    Mitral Valve: Normal leaflet structure. Normal leaflet motion. Trace regurgitation.    Aortic Valve: Tricuspid valve.  Sclerotic leaflets. Trace regurgitation. Moderate to severe stenosis. The peak aortic velocity was measured in the right sternal border view. Peak velocity = 3.84 m/s. Mean gradient = 32.00 mmHg. Calculated area by cont eq = 1.04 cm2. Calculated dimensionless index = 0.34.    Aorta: Aortic root sclerotic. Ascending aorta sclerotic.    Right Atrium: Normal sized atrium.    Right Ventricle: Normal ventricle size. Normal systolic function.    Tricuspid Valve: Structure is grossly normal. Trace regurgitation. Estimated RVSP = 23 mmHg.    Assessment/Plan     Essential  hypertension  - Target BP < 130/80  .  - Continue Lisinopril 20mg q day  - Start Verapamil 120mg ER 120mg nightly     Valvular heart disease  - Echo 1/2023: Mild [facility]. Ef 70% Intracavitary gradient. GI DD. Moderate AS, peak 3.18m/s, mean 22mmHg, KASSY 1.52cm2. DI 0.40. Mild MR.   - Echo 2/7/24: Mild [facility]. Mod mid-cavitary obstruction, resting grad 32mmHg, increases to 66mmHg with valsalva. Hyperdynamic systolic function. EF 75% Mod-Severe AS, peak 3.84m/s, mean 32mmHg, KASSY 1.04cm2.   .  - Continue same medications  - Will order repeat echo for early 2025.   - Start Verapamil 120mg q PM and monitor for improvement in intracavitary gradients and mild Sx of SOB when walking uphill with dog.     Mixed hyperlipidemia  - target LDL <  70  - FLP 3/14/23:  , , HDL 37, LDL 76  - FLP 2/24/24: , , HDL 47, LDL 80  .  - Not quite at goal on most recent check  - Stop Simvastatin 20mg q PM   - Start Atorvastatin 20mg q PM - wait 2 weeks after starting Verapamil.   - Order repeat FLP and LFTs at next visit.     Type 2 diabetes mellitus without complication, without long-term current use of insulin (CMS/[facility])  - continue statin and ace.     Follow Up Plans:  Return in about 2 months (around 6/2/2024).     MEDICATION CHANGES:  - Start Verapamil XR 120mg q PM   - Stop Simvastatin 20mg 2 weeks after starting Verapamil  - Start Atorvastatin 20mg q PM        Hemant SHAW, am scribing for, and in the presence of, Alvaro Cooley DO.    IAlvaro DO, personally performed the services described in this documentation as scribed by Hemant Young in my presence, and it is both accurate and complete.       Alvaro Cooley DO  4/2/2024

## 2024-03-25 ENCOUNTER — OFFICE VISIT (OUTPATIENT)
Dept: ENDOCRINOLOGY | Facility: CLINIC | Age: 69
End: 2024-03-25
Payer: MEDICARE

## 2024-03-25 VITALS
WEIGHT: 193 LBS | BODY MASS INDEX: 36.44 KG/M2 | HEIGHT: 61 IN | DIASTOLIC BLOOD PRESSURE: 66 MMHG | HEART RATE: 78 BPM | SYSTOLIC BLOOD PRESSURE: 124 MMHG | OXYGEN SATURATION: 100 %

## 2024-03-25 DIAGNOSIS — E11.65 TYPE 2 DIABETES MELLITUS WITH HYPERGLYCEMIA, WITHOUT LONG-TERM CURRENT USE OF INSULIN (CMS/HCC): Primary | ICD-10-CM

## 2024-03-25 PROCEDURE — G8752 SYS BP LESS 140: HCPCS | Performed by: NURSE PRACTITIONER

## 2024-03-25 PROCEDURE — G8754 DIAS BP LESS 90: HCPCS | Performed by: NURSE PRACTITIONER

## 2024-03-25 PROCEDURE — 99214 OFFICE O/P EST MOD 30 MIN: CPT | Performed by: NURSE PRACTITIONER

## 2024-03-25 RX ORDER — SEMAGLUTIDE 0.68 MG/ML
0.5 INJECTION, SOLUTION SUBCUTANEOUS
Qty: 6 ML | Refills: 0 | Status: SHIPPED | OUTPATIENT
Start: 2024-03-25 | End: 2024-04-24

## 2024-03-25 NOTE — PROGRESS NOTES
Kailyn is a 68 y.o. female here for follow up for diabetes management.     LOV with me 2023     Events since last visit:  Had strep throat last week     DM2 med regimen:  Metformin 1000 mg p.o. twice daily  Acarbose 100 mg p.o. 3 times a day with meals  Ozempic 0.5 mg once a week    Mild nausea, HA, and fatigue since starting Ozempic    Previous meds not currently taking:  Trulicity is too expensive  Jardiance- UTI  Victoza- ineffective     SMBG:   BG log reviewed:  Fastin-130  Post meal: up to 160  No hypo-     Diet:   Not following ADA    More candy     Exercise:   10-minute walk in the morning with her dog up a hill.    Trying to lift wts 3 times a day at home  Babysit grandkids during the day     Ophthalmology:   LOV in 2023  Dr. Wallace  Retinopathy: None reported  DUE     Podiatry:   Self checks  Neuropathy: None  Sees podiatry every 4 months  Dr. Horne    Wt Readings from Last 3 Encounters:   24 87.5 kg (193 lb)   24 87 kg (191 lb 12.8 oz)   23 87.5 kg (192 lb 12.8 oz)     Temp Readings from Last 3 Encounters:   10/30/23 36.8 °C (98.3 °F) (Oral)   08/15/23 36.2 °C (97.2 °F) (Temporal)   23 37 °C (98.6 °F)     BP Readings from Last 3 Encounters:   24 124/66   24 129/67   23 130/78     Pulse Readings from Last 3 Encounters:   24 78   10/30/23 73   23 69     Review of Systems   Constitutional: Negative.    HENT: Negative.    Eyes: Negative.    Respiratory: Negative.    Cardiovascular: Negative.    Gastrointestinal: Negative.    Endocrine: Negative.    Genitourinary: Negative.    Musculoskeletal: Negative.    Skin: Negative.    Neurological: Negative.    Hematological: Negative.    Psychiatric/Behavioral: Negative.       Physical Exam  Vitals reviewed.   Constitutional:       Appearance: Normal appearance. She is obese.   Neck:      Comments: No thyromegaly  Cardiovascular:      Rate and Rhythm: Normal rate and regular rhythm.      Heart sounds:  Murmur heard.   Pulmonary:      Effort: Pulmonary effort is normal.      Breath sounds: Normal breath sounds.   Abdominal:      General: Bowel sounds are normal.      Palpations: Abdomen is soft.   Musculoskeletal:         General: Normal range of motion.      Cervical back: Neck supple.   Skin:     General: Skin is warm and dry.   Neurological:      General: No focal deficit present.      Mental Status: She is alert and oriented to person, place, and time.   Psychiatric:         Mood and Affect: Mood normal.               1. Type 2 diabetes mellitus without complication, without on long term insulin use (CMS/[facility])  Glycemic control at goal: A1c 6.8%->6.0%.     Recommend:  -cont Ozempic 0.5 mg weekly and same dose of Metformin and Acarbose  -lab in 6 months     Discussed with patient today importance of improving glycemic control in order to prevent complications to eyes, heart, kidneys, and nerves.  Reviewed an A1c goal of less than 7% is ideal in order to prevent the complications as noted above.     Discussed safe glycemic goals and targets:   Fasting B-140.  2 hours after meals:  <180.     I discussed hypoglycemia management and treatment.  Discussed rule of 15.  Always carry candy on hand.  Do not drive car if blood sugar less than 100 or greater than 300.     Discussed importance of yearly dilated eye exam to assess for retinopathy.  Discussed importance of daily foot inspection and follow up with podiatry on routine basis.     2.  Hypertension  BP at goal.  She is on quinapril 20 mg daily.  Management per PCP.     3. Dyslipidemia  LDL 80  Continue simvastatin 20 mg and fish oil  Further management per PCP.     4.  Obesity  BMI 36  Reviewed with patient today importance of following a balanced diet, monitoring carbohydrate portions, and avoiding sweetened snacks and beverages.      Reviewed with patient importance of lifestyle modification, including increasing frequency of exercise to aid in improving  glycemic control.  Recommend 150 minutes of aerobic exercise a week.  She is tolerating GLP-1.

## 2024-04-01 PROBLEM — I34.0 NONRHEUMATIC MITRAL VALVE REGURGITATION: Status: RESOLVED | Noted: 2023-08-08 | Resolved: 2024-04-01

## 2024-04-01 NOTE — ASSESSMENT & PLAN NOTE
- Echo 1/2023: Mild CLVH. Ef 70% Intracavitary gradient. GI DD. Moderate AS, peak 3.18m/s, mean 22mmHg, KASSY 1.52cm2. DI 0.40. Mild MR.   - Echo 2/7/24: Mild CLVH. Mod mid-cavitary obstruction, resting grad 32mmHg, increases to 66mmHg with valsalva. Hyperdynamic systolic function. EF 75% Mod-Severe AS, peak 3.84m/s, mean 32mmHg, KASSY 1.04cm2.   .  - Continue same medications  - Will order repeat echo for early 2025.   - Start Verapamil 120mg q PM and monitor for improvement in intracavitary gradients and mild Sx of SOB when walking uphill with dog.

## 2024-04-01 NOTE — ASSESSMENT & PLAN NOTE
- target LDL <  70  - FLP 3/14/23:  , , HDL 37, LDL 76  - FLP 2/24/24: , , HDL 47, LDL 80  .  - Not quite at goal on most recent check  - Stop Simvastatin 20mg q PM   - Start Atorvastatin 20mg q PM - wait 2 weeks after starting Verapamil.   - Order repeat FLP and LFTs at next visit.

## 2024-04-01 NOTE — ASSESSMENT & PLAN NOTE
- Target BP < 130/80  .  - Continue Lisinopril 20mg q day  - Start Verapamil 120mg ER 120mg nightly

## 2024-04-02 ENCOUNTER — OFFICE VISIT (OUTPATIENT)
Dept: CARDIOLOGY | Facility: CLINIC | Age: 69
End: 2024-04-02
Payer: MEDICARE

## 2024-04-02 VITALS
HEIGHT: 61 IN | OXYGEN SATURATION: 99 % | SYSTOLIC BLOOD PRESSURE: 124 MMHG | DIASTOLIC BLOOD PRESSURE: 70 MMHG | HEART RATE: 75 BPM | BODY MASS INDEX: 36.44 KG/M2 | WEIGHT: 193 LBS

## 2024-04-02 DIAGNOSIS — E78.2 MIXED HYPERLIPIDEMIA: Chronic | ICD-10-CM

## 2024-04-02 DIAGNOSIS — E11.9 TYPE 2 DIABETES MELLITUS WITHOUT COMPLICATION, WITHOUT LONG-TERM CURRENT USE OF INSULIN (CMS/HCC): ICD-10-CM

## 2024-04-02 DIAGNOSIS — I38 VALVULAR HEART DISEASE: Chronic | ICD-10-CM

## 2024-04-02 DIAGNOSIS — I10 ESSENTIAL HYPERTENSION: Primary | Chronic | ICD-10-CM

## 2024-04-02 LAB
ATRIAL RATE: 70
P AXIS: 32
PR INTERVAL: 176
QRS DURATION: 80
QT INTERVAL: 398
QTC CALCULATION(BAZETT): 429
R AXIS: 32
T WAVE AXIS: 197
VENTRICULAR RATE: 70

## 2024-04-02 PROCEDURE — 93000 ELECTROCARDIOGRAM COMPLETE: CPT | Performed by: INTERNAL MEDICINE

## 2024-04-02 PROCEDURE — G8754 DIAS BP LESS 90: HCPCS | Performed by: INTERNAL MEDICINE

## 2024-04-02 PROCEDURE — G8752 SYS BP LESS 140: HCPCS | Performed by: INTERNAL MEDICINE

## 2024-04-02 PROCEDURE — 99214 OFFICE O/P EST MOD 30 MIN: CPT | Performed by: INTERNAL MEDICINE

## 2024-04-02 RX ORDER — HYDROCHLOROTHIAZIDE 25 MG/1
120 TABLET ORAL NIGHTLY
Qty: 30 TABLET | Refills: 3 | Status: SHIPPED | OUTPATIENT
Start: 2024-04-02 | End: 2024-07-19

## 2024-04-02 RX ORDER — ATORVASTATIN CALCIUM 20 MG/1
20 TABLET, FILM COATED ORAL NIGHTLY
Qty: 30 TABLET | Refills: 3 | Status: SHIPPED | OUTPATIENT
Start: 2024-04-02 | End: 2024-08-05

## 2024-04-02 ASSESSMENT — ENCOUNTER SYMPTOMS
NEAR-SYNCOPE: 0
LIGHT-HEADEDNESS: 0
SYNCOPE: 0
IRREGULAR HEARTBEAT: 0
WEIGHT LOSS: 1
BRUISES/BLEEDS EASILY: 0
ORTHOPNEA: 0
DYSPNEA ON EXERTION: 0
DIZZINESS: 0
PALPITATIONS: 0

## 2024-04-02 NOTE — PATIENT INSTRUCTIONS
Continue Simvastatin for right now     Start Verapamil 120mg 1 pill nightly     2 weeks after starting Verapamil I want you to change from Simvastatin to Atorvastatin 20mg nightly    Follow up in 2 months       Nsaids Pregnancy And Lactation Text: These medications are considered safe up to 30 weeks gestation. It is excreted in breast milk.

## 2024-04-02 NOTE — LETTER
April 2, 2024     Billy Antunez DO  [address] [facility] [address]    Patient: Citlali Gifford  YOB: 1955  Date of Visit: 4/2/2024      Dear Dr. Antunez:    Thank you for referring Citlali Gifford to me for evaluation. Below are my notes for this consultation.    If you have questions, please do not hesitate to call me. I look forward to following your patient along with you.         Sincerely,        Alvaro Cooley DO        CC: TERESA Del Rio William N, DO  4/2/2024  9:56 AM  Sign when Signing Visit       Cardiology  Office Progress Note       Reason for visit:   Chief Complaint   Patient presents with   • Follow-up     HPI    Citlali Gifford is a 68 y.o. female who presents to the office for cardiovascular follow up and management of hypertension, dyslipidemia, aortic stenosis and mitral regurgitation.     She was last seen in the office on 8/21/23. At that visit, she reported feeling well and offered no complaints. She started WW 2 weeks prior to her appointment an was down 7 lbs. She was active watching her grandchildren 5 days a week. She denied any cardiac complaints.     I ordered her an echo and fasting labs and made no changes to her current regimen.    Echo 2/7/24      Labs 2/24/24 - , , HDL 47, LDL 80    Today she reports feeling well. She followed up with TERESA Del Rio on 3/25 and was ordered repeat labs and a 6 month follow up. She has remained active at home caring for her grandchildren. She has stopped WW. She has lost an additional 3 lbs. She has been tolerating her medications without any adverse reactions. She denies any chest pain, shortness of breath, palpitations, lightheadedness, dizziness or syncopal events.     Past Medical History:   Diagnosis Date   • Anxiety    • Aortic stenosis    • Claustrophobia    • Elevated liver enzymes     dx-  fatty liver   • Hypertension    • Impaired hearing     bilateral - no hearing  "aids   • Lipid disorder    • Numbness     left foot- secondary to previous  back injury    • Postmenopausal    • Sleep apnea     compliant with CPAP- nightly   • Tendonitis     left shoulder - treatment with PT   • Type 2 diabetes mellitus (CMS/HCC)    • Vertigo     ongoing- intermittent with position changes- treatment with meclizine and nasal spray     Past Surgical History:   Procedure Laterality Date   •  SECTION     • CHOLECYSTECTOMY     • DILATION AND EVACUATION     • ENDOMETRIAL ABLATION     • GALLBLADDER SURGERY     • HERNIA REPAIR     • HYSTERECTOMY     • KNEE ARTHROSCOPY Right     repair torn meniscus   • TONSILLECTOMY       Social History     Tobacco Use   • Smoking status: Never   • Smokeless tobacco: Never   Vaping Use   • Vaping Use: Never used   Substance Use Topics   • Alcohol use: Not Currently     Comment: ocasional   • Drug use: Never     Family History   Problem Relation Age of Onset   • Hypertension Biological Mother    • Diabetes Biological Mother    • Heart attack Biological Father    • No Known Problems Biological Sister    • Hypertension Biological Brother    • Diabetes Maternal Grandmother    • Colon cancer Paternal Grandfather    • No Known Problems Biological Daughter    • Hyperlipidemia Biological Daughter    • No Known Problems Biological Son      Allergies:  Amoxicillin-pot clavulanate, Sulfa (sulfonamide antibiotics), and Wellbutrin [bupropion hcl]    Current Outpatient Medications   Medication Sig Dispense Refill   • acarbose (PRECOSE) 100 mg tablet TAKE 1 TAB ORAL 3 X DAILY AT START OF EACH MEAL 3 TABS DAILY 270 tablet 3   • ammonium lactate (LAC-HYDRIN) 12 % lotion APPLY DAILY TO FEET     • atorvastatin (LIPITOR) 20 mg tablet Take 1 tablet (20 mg total) by mouth nightly. 30 tablet 3   • b complex vitamins tablet daily.     • BD ENRIQUETA 2ND GEN PEN NEEDLE 32 gauge x \" needle USE FOR DAILY INJECTION 100 each 3   • blood sugar diagnostic (CONTOUR NEXT TEST STRIPS) strip Test " glucose once daily 100 strip 3   • blood sugar diagnostic (ONETOUCH VERIO TEST STRIPS) strip TEST 3 TIMES A  strip 7   • blood-glucose meter (ONETOUCH VERIO FLEX METER) [facility] CHECK GLUCOSE 3 TIMES ADAY 300 each 3   • cholecalciferol, vitamin D3, 25 mcg (1,000 unit) capsule One tablet daily     • folic acid (FOLVITE) 1 mg tablet Take 1 mg by mouth daily.     • lancets (ONETOUCH DELICA LANCETS) 33 gauge misc Check glucose 3 times aday 200 each 3   • lisinopriL (PRINIVIL) 20 mg tablet Take 20 mg by mouth daily.     • metFORMIN (GLUCOPHAGE) 500 mg tablet Take 2 tablets (1,000 mg total) by mouth 2 (two) times a day with meals. 360 tablet 3   • omega-3 fatty acids-fish oil 300-1,000 mg capsule take 1 capsule daily     • omeprazole (PriLOSEC) 20 mg capsule Take 20 mg by mouth daily before breakfast.     • resvera/chrom/gr.tea/EGCG/dig3 (RESVERATROL DIET ORAL) Take by mouth.     • semaglutide (OZEMPIC) 0.25 mg or 0.5 mg (2 mg/3 mL) subcutaneous injection Inject 0.5 mg under the skin every (seven) 7 days. 6 mL 0   • sertraline (ZOLOFT) 100 mg tablet Take 100 mg by mouth every evening.  2   • verapamil SR (CALAN-SR) 120 mg CR tablet Take 1 tablet (120 mg total) by mouth nightly. 30 tablet 3   • zinc sulfate (ZINCATE) 220 (50) mg capsule Take 220 mg by mouth daily.     • quinapriL (ACCUPRIL) 20 mg tablet Take 20 mg by mouth daily.   180 tablet 3     No current facility-administered medications for this visit.     Review of Systems   Constitutional: Positive for weight loss (- 3 lbs since 8/21/23). Negative for malaise/fatigue.   Cardiovascular:  Negative for chest pain, dyspnea on exertion, irregular heartbeat, leg swelling, near-syncope, orthopnea, palpitations and syncope.   Hematologic/Lymphatic: Does not bruise/bleed easily.   Neurological:  Negative for dizziness and light-headedness.   All other systems reviewed and are negative.    Objective    Vitals:    04/02/24 0807   BP: 124/70   Pulse: 75   SpO2: 99%     Wt  Readings from Last 5 Encounters:   04/02/24 87.5 kg (193 lb)   03/25/24 87.5 kg (193 lb)   02/07/24 87 kg (191 lb 12.8 oz)   11/16/23 87.5 kg (192 lb 12.8 oz)   10/30/23 87.5 kg (193 lb)     Body mass index is 36.47 kg/m².    Physical Exam  Vitals and nursing note reviewed.   Constitutional:       Appearance: Normal appearance. She is well-developed.   HENT:      Head: Normocephalic and atraumatic.   Eyes:      General: No scleral icterus.  Neck:      Vascular: Carotid bruit present. No JVD.   Cardiovascular:      Rate and Rhythm: Normal rate and regular rhythm.      Pulses: Normal pulses and intact distal pulses.           Carotid pulses are 2+ on the right side with bruit and 2+ on the left side with bruit.       Radial pulses are 2+ on the right side and 2+ on the left side.        Dorsalis pedis pulses are 2+ on the right side and 2+ on the left side.        Posterior tibial pulses are 2+ on the right side and 2+ on the left side.      Heart sounds: S1 normal. Murmur (III/VI RADHA at base that radiates towards bilateral carotids. Diminished S2.) heard.      No friction rub. No gallop. No S3 or S4 sounds.   Pulmonary:      Effort: Pulmonary effort is normal. No respiratory distress.      Breath sounds: Normal breath sounds. No stridor. No wheezing, rhonchi or rales.   Chest:      Chest wall: No tenderness.   Abdominal:      General: Bowel sounds are normal. There is no distension.      Palpations: Abdomen is soft. There is no mass.      Tenderness: There is no abdominal tenderness. There is no guarding or rebound.   Musculoskeletal:      Right lower leg: No edema.      Left lower leg: No edema.   Skin:     General: Skin is warm and dry.      Coloration: Skin is not pale.      Findings: No erythema or rash.   Neurological:      General: No focal deficit present.      Mental Status: She is alert and oriented to person, place, and time.   Psychiatric:         Mood and Affect: Mood normal.         Behavior: Behavior  normal.         Thought Content: Thought content normal.         Judgment: Judgment normal.       ECG   sinus rhythm, I have independently reviewed the patient's ECG results.  Significant abnormals are :.  Normal sinus rhythm 70bpm  Possible Left atrial enlargement  Septal infarct (cited on or before 21-AUG-2023)  T wave abnormality, consider inferolateral ischemia  Abnormal ECG  When compared with ECG of 21-AUG-2023 08:11,  T wave inversion now evident in Inferior leads  T wave inversion now evident in Anterior leads  Confirmed by Alvaro Cooley ([id number]) on 4/2/2024 8:30:49 AM     Hematology  Lab Results   Component Value Date    WBC 6.7 12/15/2020    HGB 14.4 12/15/2020    HCT 43.9 12/15/2020     12/15/2020    INR 1.0 12/05/2016       Chemistries  Lab Results   Component Value Date     09/23/2023    K 4.5 09/23/2023     09/23/2023    CREATININE 0.80 09/23/2023    BUN 25 09/23/2023    CO2 25 09/23/2023    GLUCOSE 126 (H) 09/23/2023    CALCIUM 9.8 09/23/2023    MG 2.2 12/05/2016    ALT 97 (H) 09/09/2021    AST 60 (H) 09/09/2021     Cholesterol  Lab Results   Component Value Date    CHOL 135 03/13/2023    TRIG 135 03/13/2023    HDL 37 (L) 03/13/2023    LDLCALC 76 03/13/2023     Endocrine  Lab Results   Component Value Date    TSH 3.03 12/15/2020    FREET4 0.86 10/26/2015    HGBA1C 6.1 (H) 09/23/2023     Cardiac Imaging    TRANSTHORACIC ECHO (TTE) COMPLETE 02/07/2024    Interpretation Summary  •  Left Ventricle: Normal ventricle size. Mild concentric left ventricular hypertrophy. Moderate mid-cavity obstruction present. Resting gradient 32mmHg that increases to 66mmHg with valsalva. Hyperdynamic systolic function. Estimated EF 75%. Wall motion appears grossly normal. Normal septal motion. Grade I diastolic dysfunction. Diastolic inflow pattern consistent with impaired relaxation.  •  Left Atrium: Moderately dilated atrium.  •  Mitral Valve: Normal leaflet structure. Normal leaflet motion. Trace  regurgitation.  •  Aortic Valve: Tricuspid valve.  Sclerotic leaflets. Trace regurgitation. Moderate to severe stenosis. The peak aortic velocity was measured in the right sternal border view. Peak velocity = 3.84 m/s. Mean gradient = 32.00 mmHg. Calculated area by cont eq = 1.04 cm2. Calculated dimensionless index = 0.34.  •  Aorta: Aortic root sclerotic. Ascending aorta sclerotic.  •  Right Atrium: Normal sized atrium.  •  Right Ventricle: Normal ventricle size. Normal systolic function.  •  Tricuspid Valve: Structure is grossly normal. Trace regurgitation. Estimated RVSP = 23 mmHg.    Assessment/Plan    Essential hypertension  - Target BP < 130/80  .  - Continue Lisinopril 20mg q day  - Start Verapamil 120mg ER 120mg nightly     Valvular heart disease  - Echo 1/2023: Mild CLVH. Ef 70% Intracavitary gradient. GI DD. Moderate AS, peak 3.18m/s, mean 22mmHg, KASSY 1.52cm2. DI 0.40. Mild MR.   - Echo 2/7/24: Mild CLVH. Mod mid-cavitary obstruction, resting grad 32mmHg, increases to 66mmHg with valsalva. Hyperdynamic systolic function. EF 75% Mod-Severe AS, peak 3.84m/s, mean 32mmHg, KASSY 1.04cm2.   .  - Continue same medications  - Will order repeat echo for early 2025.   - Start Verapamil 120mg q PM and monitor for improvement in intracavitary gradients and mild Sx of SOB when walking uphill with dog.     Mixed hyperlipidemia  - target LDL <  70  - FLP 3/14/23:  , , HDL 37, LDL 76  - FLP 2/24/24: , , HDL 47, LDL 80  .  - Not quite at goal on most recent check  - Stop Simvastatin 20mg q PM   - Start Atorvastatin 20mg q PM - wait 2 weeks after starting Verapamil.   - Order repeat FLP and LFTs at next visit.     Type 2 diabetes mellitus without complication, without long-term current use of insulin (CMS/Formerly [facility])  - continue statin and ace.     Follow Up Plans:  Return in about 2 months (around 6/2/2024).     MEDICATION CHANGES:  - Start Verapamil XR 120mg q PM   - Stop Simvastatin 20mg 2 weeks after  starting Verapamil  - Start Atorvastatin 20mg q PM        I, Hemant Young, am scribing for, and in the presence of, Alvaro Cooley DO.    I, Alvaro Cooley DO, personally performed the services described in this documentation as scribed by Hemant Young in my presence, and it is both accurate and complete.       Alvaro Cooley DO  4/2/2024

## 2024-05-09 ENCOUNTER — OFFICE VISIT (OUTPATIENT)
Dept: OTOLARYNGOLOGY | Facility: CLINIC | Age: 69
End: 2024-05-09
Payer: MEDICARE

## 2024-05-09 VITALS
DIASTOLIC BLOOD PRESSURE: 70 MMHG | TEMPERATURE: 97.1 F | WEIGHT: 193 LBS | SYSTOLIC BLOOD PRESSURE: 112 MMHG | BODY MASS INDEX: 36.44 KG/M2 | OXYGEN SATURATION: 97 % | HEIGHT: 61 IN | HEART RATE: 74 BPM

## 2024-05-09 DIAGNOSIS — H66.011 ACUTE SUPPURATIVE OTITIS MEDIA OF RIGHT EAR WITH SPONTANEOUS RUPTURE OF TYMPANIC MEMBRANE, RECURRENCE NOT SPECIFIED: Primary | ICD-10-CM

## 2024-05-09 PROCEDURE — G8752 SYS BP LESS 140: HCPCS | Performed by: OTOLARYNGOLOGY

## 2024-05-09 PROCEDURE — 99213 OFFICE O/P EST LOW 20 MIN: CPT | Performed by: OTOLARYNGOLOGY

## 2024-05-09 PROCEDURE — G8754 DIAS BP LESS 90: HCPCS | Performed by: OTOLARYNGOLOGY

## 2024-05-09 RX ORDER — CIPROFLOXACIN 500 MG/1
500 TABLET ORAL 2 TIMES DAILY
Qty: 20 TABLET | Refills: 0 | Status: SHIPPED | OUTPATIENT
Start: 2024-05-09 | End: 2024-05-19

## 2024-05-09 RX ORDER — CIPROFLOXACIN AND DEXAMETHASONE 3; 1 MG/ML; MG/ML
4 SUSPENSION/ DROPS AURICULAR (OTIC) 2 TIMES DAILY
Qty: 7.5 ML | Refills: 3 | Status: SHIPPED | OUTPATIENT
Start: 2024-05-09 | End: 2024-05-16

## 2024-05-09 NOTE — PROGRESS NOTES
[facility]  [address], [address]  [place] [address]  Phone: [phone]  Fax: [phone]      Patient ID: Citlali Gifford                              : 1955    Visit Date: 2024  Referring Provider: Billy Antunez DO    Chief Complaint: Cerumen Impaction (vertigo)      Citlali Gifford is a 68 y.o.  female who presents with for dizziness.  She has a history of BPPV for which she has seen Dr. Willoughby in the past.  She recently 2 weeks ago experienced a new flareup episode of her BPPV saw her primary care and started seeing physical therapy she has done an Epley maneuver with physical therapy there is been some improvement but she is not totally back to normal as far as her dizziness and balance she cannot lay on her right side still it still makes her feel dizzy.    She does not notice any drainage from her ear.  She is not aware of any hearing impairment.    Reviewing her prior notes Dr. Willoughby saw her in 2018 and attempted to work her up with a MRI however since she was unable to tolerate it ended up getting a CT scan that showed some chronic middle ear or mastoid disease that sent her to Dr. Nunez.  She has not seen an ENT doctor since then.  And would like to reestablish care.  She thinks she may have some wax in her ears    Review of Systems   All other systems reviewed and are negative.      Current Medications: has a current medication list which includes the following prescription(s): acarbose, ammonium lactate, atorvastatin, b complex vitamins, bd henna 2nd gen pen needle, contour next test strips, onetouch verio test strips, blood-glucose meter, cholecalciferol (vitamin d3), ciprofloxacin, ciprofloxacin-dexamethasone, folic acid, lancets, lisinopril, metformin, omega-3 fatty acids-fish oil, omeprazole, resvera/chrom/gr.tea/egcg/dig3, semaglutide, sertraline, verapamil sr, zinc sulfate, and quinapril.    Past Medical History:   Past Medical History:  "  Diagnosis Date    Anxiety     Aortic stenosis     Claustrophobia     Elevated liver enzymes     dx-  fatty liver    Hypertension     Impaired hearing     bilateral - no hearing aids    Lipid disorder     Numbness     left foot- secondary to previous  back injury     Postmenopausal     Sleep apnea     compliant with CPAP- nightly    Tendonitis     left shoulder - treatment with PT    Type 2 diabetes mellitus (CMS/HCC)     Vertigo     ongoing- intermittent with position changes- treatment with meclizine and nasal spray       Past Surgical History:   Past Surgical History:   Procedure Laterality Date     SECTION      CHOLECYSTECTOMY      DILATION AND EVACUATION      ENDOMETRIAL ABLATION      GALLBLADDER SURGERY      HERNIA REPAIR      HYSTERECTOMY      KNEE ARTHROSCOPY Right     repair torn meniscus    TONSILLECTOMY         Social History:   Social History     Tobacco Use    Smoking status: Never    Smokeless tobacco: Never   Vaping Use    Vaping Use: Never used   Substance Use Topics    Alcohol use: Not Currently     Comment: ocasional    Drug use: Never       Family History:   Family History   Problem Relation Age of Onset    Hypertension Biological Mother     Diabetes Biological Mother     Heart attack Biological Father     No Known Problems Biological Sister     Hypertension Biological Brother     Diabetes Maternal Grandmother     Colon cancer Paternal Grandfather     No Known Problems Biological Daughter     Hyperlipidemia Biological Daughter     No Known Problems Biological Son            Allergies: Amoxicillin-pot clavulanate, Sulfa (sulfonamide antibiotics), and Wellbutrin [bupropion hcl]    Physical Exam:  Vitals: Visit Vitals  /70 (BP Location: Left upper arm, Patient Position: Sitting)   Pulse 74   Temp 36.2 °C (97.1 °F) (Temporal)   Ht 1.549 m (5' 1\")   Wt 87.5 kg (193 lb)   SpO2 97%   BMI 36.47 kg/m²     General:  Well-developed, well-nourished 68 y.o. femalein no acute distress.  Voice: " Normal without hoarseness, breathiness or stridor.  Head/face:  No scars or lesions.  No parotid masses. No presinus tenderness. Symmetric, normal facies.  Eyes:  Extraocular movements and gaze alignment normal.  Ears: Auricles normal.  External auditory canals free of cerumen.  There is a perforation inferiorly about 30% of the TM on the right with purulent drainage that was suction patient cannot tolerate suctioning.  Left TM intact mild retraction t   Bilateral ears visualized with binocular otomicroscope.  This was medically necessary to obtain an accurate exam of the tympanic membrane   Nose:  Dorsum straight.   Turbinates normal in size and orientation.  No pus, polyps or crusts.  Oral Cavity/oropharynx:  Normal tongue thrust. Normal gag reflex. No masses, leukoplakia, erythroplakia, ulcers or other abnormalities at the tongue, floor of mouth, buccal mucosa, palate or posterior pharyngeal wall.     Neck:  No masses, adenopathy, cervical spasm or thyroid enlargement.  Cranial Nerves II through XII: Grossly intact.  Lungs: equal chest rise  Heart: Regular rate and rhythm.  Mental status: Awake, alert and oriented ×3.    Physical Exam          Impression:  68 y.o.  female with Acute suppurative otitis media of right ear with spontaneous rupture of tympanic membrane, recurrence not specified  (primary encounter diagnosis)    You have an infection in the Right ear - I recommend oral and cipropdex ear drops. Twice a day for 10 days. Follow up in 2-3 weeks with hearing test.     Continue physical therapy for the dizziness.  Home Epley as well.     Problem List Items Addressed This Visit    None  Visit Diagnoses       Acute suppurative otitis media of right ear with spontaneous rupture of tympanic membrane, recurrence not specified    -  Primary            Otilia Nair MD

## 2024-05-09 NOTE — PATIENT INSTRUCTIONS
You have an infection in the Right ear - I recommend oral and cipropdex ear drops. Twice a day for 10 days. Follow up in 2-3 weeks with hearing test.     Continue physical therapy for the dizziness.  Home Epley as well.

## 2024-05-09 NOTE — LETTER
May 9, 2024     Billy Antunez DO  [address] [address]    Patient: Citlali Gifford  YOB: 1955  Date of Visit: 2024      Dear Dr. Antunez:    Thank you for referring Citlali Gifford to me for evaluation. Below are my notes for this consultation.    If you have questions, please do not hesitate to call me. I look forward to following your patient along with you.         Sincerely,        Otilia Nair MD        CC: No Recipients  Otilia Nair MD  2024  4:00 PM  Sign when Signing Visit      [facility]  [address], [address]  [place] [address]  Phone: [phone]  Fax: [phone]      Patient ID: Citlali Gifford                              : 1955    Visit Date: 2024  Referring Provider: Billy Antunez DO    Chief Complaint: Cerumen Impaction (vertigo)      Citlali Gifford is a 68 y.o.  female who presents with for dizziness.  She has a history of BPPV for which she has seen Dr. Willoughby in the past.  She recently 2 weeks ago experienced a new flareup episode of her BPPV saw her primary care and started seeing physical therapy she has done an Epley maneuver with physical therapy there is been some improvement but she is not totally back to normal as far as her dizziness and balance she cannot lay on her right side still it still makes her feel dizzy.    She does not notice any drainage from her ear.  She is not aware of any hearing impairment.    Reviewing her prior notes Dr. Willoughby saw her in 2018 and attempted to work her up with a MRI however since she was unable to tolerate it ended up getting a CT scan that showed some chronic middle ear or mastoid disease that sent her to Dr. Nunez.  She has not seen an ENT doctor since then.  And would like to reestablish care.  She thinks she may have some wax in her ears    Review of Systems   All other systems reviewed and are negative.      Current Medications:  has a current medication list which includes the following prescription(s): acarbose, ammonium lactate, atorvastatin, b complex vitamins, bd henna 2nd gen pen needle, contour next test strips, onetouch verio test strips, blood-glucose meter, cholecalciferol (vitamin d3), ciprofloxacin, ciprofloxacin-dexamethasone, folic acid, lancets, lisinopril, metformin, omega-3 fatty acids-fish oil, omeprazole, resvera/chrom/gr.tea/egcg/dig3, semaglutide, sertraline, verapamil sr, zinc sulfate, and quinapril.    Past Medical History:   Past Medical History:   Diagnosis Date   • Anxiety    • Aortic stenosis    • Claustrophobia    • Elevated liver enzymes     dx-  fatty liver   • Hypertension    • Impaired hearing     bilateral - no hearing aids   • Lipid disorder    • Numbness     left foot- secondary to previous  back injury    • Postmenopausal    • Sleep apnea     compliant with CPAP- nightly   • Tendonitis     left shoulder - treatment with PT   • Type 2 diabetes mellitus (CMS/HCC)    • Vertigo     ongoing- intermittent with position changes- treatment with meclizine and nasal spray       Past Surgical History:   Past Surgical History:   Procedure Laterality Date   •  SECTION     • CHOLECYSTECTOMY     • DILATION AND EVACUATION     • ENDOMETRIAL ABLATION     • GALLBLADDER SURGERY     • HERNIA REPAIR     • HYSTERECTOMY     • KNEE ARTHROSCOPY Right     repair torn meniscus   • TONSILLECTOMY         Social History:   Social History     Tobacco Use   • Smoking status: Never   • Smokeless tobacco: Never   Vaping Use   • Vaping Use: Never used   Substance Use Topics   • Alcohol use: Not Currently     Comment: ocasional   • Drug use: Never       Family History:   Family History   Problem Relation Age of Onset   • Hypertension Biological Mother    • Diabetes Biological Mother    • Heart attack Biological Father    • No Known Problems Biological Sister    • Hypertension Biological Brother    • Diabetes Maternal Grandmother    •  "Colon cancer Paternal Grandfather    • No Known Problems Biological Daughter    • Hyperlipidemia Biological Daughter    • No Known Problems Biological Son            Allergies: Amoxicillin-pot clavulanate, Sulfa (sulfonamide antibiotics), and Wellbutrin [bupropion hcl]    Physical Exam:  Vitals: Visit Vitals  /70 (BP Location: Left upper arm, Patient Position: Sitting)   Pulse 74   Temp 36.2 °C (97.1 °F) (Temporal)   Ht 1.549 m (5' 1\")   Wt 87.5 kg (193 lb)   SpO2 97%   BMI 36.47 kg/m²     General:  Well-developed, well-nourished 68 y.o. femalein no acute distress.  Voice: Normal without hoarseness, breathiness or stridor.  Head/face:  No scars or lesions.  No parotid masses. No presinus tenderness. Symmetric, normal facies.  Eyes:  Extraocular movements and gaze alignment normal.  Ears: Auricles normal.  External auditory canals free of cerumen.  There is a perforation inferiorly about 30% of the TM on the right with purulent drainage that was suction patient cannot tolerate suctioning.  Left TM intact mild retraction t   Bilateral ears visualized with binocular otomicroscope.  This was medically necessary to obtain an accurate exam of the tympanic membrane   Nose:  Dorsum straight.   Turbinates normal in size and orientation.  No pus, polyps or crusts.  Oral Cavity/oropharynx:  Normal tongue thrust. Normal gag reflex. No masses, leukoplakia, erythroplakia, ulcers or other abnormalities at the tongue, floor of mouth, buccal mucosa, palate or posterior pharyngeal wall.     Neck:  No masses, adenopathy, cervical spasm or thyroid enlargement.  Cranial Nerves II through XII: Grossly intact.  Lungs: equal chest rise  Heart: Regular rate and rhythm.  Mental status: Awake, alert and oriented ×3.    Physical Exam          Impression:  68 y.o.  female with Acute suppurative otitis media of right ear with spontaneous rupture of tympanic membrane, recurrence not specified  (primary encounter diagnosis)    You have an " infection in the Right ear - I recommend oral and cipropdex ear drops. Twice a day for 10 days. Follow up in 2-3 weeks with hearing test.     Continue physical therapy for the dizziness.  Home Epley as well.     Problem List Items Addressed This Visit    None  Visit Diagnoses       Acute suppurative otitis media of right ear with spontaneous rupture of tympanic membrane, recurrence not specified    -  Primary            Otilia Nair MD

## 2024-05-13 DIAGNOSIS — E11.65 TYPE 2 DIABETES MELLITUS WITH HYPERGLYCEMIA, WITHOUT LONG-TERM CURRENT USE OF INSULIN (CMS/HCC): Primary | ICD-10-CM

## 2024-05-13 NOTE — TELEPHONE ENCOUNTER
Patient has indicated that left a couple of messages since Friday May 3rd requesting refill on the Ozempic and also needs a increase on the dosage. Patient was on .5mg and needs the next dose up.    Please submit the prescription to the [facility] in [place].    Please advise

## 2024-05-13 NOTE — TELEPHONE ENCOUNTER
Per pt she wants to increase her dose she states the dose she is on is not working for her sugars. I pended the 1mg dose. Please advise. As pt also stated you did not want to increase her dose previously.

## 2024-05-15 DIAGNOSIS — E11.65 TYPE 2 DIABETES MELLITUS WITH HYPERGLYCEMIA, WITHOUT LONG-TERM CURRENT USE OF INSULIN (CMS/HCC): ICD-10-CM

## 2024-05-31 NOTE — PROGRESS NOTES
Cardiology  Office Progress Note       Reason for visit:   Chief Complaint   Patient presents with    Follow-up     HPI     Citlali Gifford is a 68 y.o. female who presents to the office for cardiovascular follow up and management of  hypertension, dyslipidemia, aortic stenosis and mitral regurgitation.     She was last seen in the office on 24. At that visit, she remained active caring for her grandchildren. She stopped WW. She had lost an additional 3 lbs. She denied any cardiac complains.     I started her on Verapamil 120 mg nightly and switched her Simvastatin to Atorvastatin 20 mg q PM.    Today she reports feeling fine. She has tolerated the changes in her medications without any adverse reactions. She has remained active at home, walking her dog and babysitting 5 days per week without CV limitations. She notes constant fatigue and is unsure if it is related to the weather or her CPAP use. She is able to perform her normal activities without complications. She denies any chest pains, shortness of breath, palpitations, lightheadedness, dizziness or syncopal events.     Past Medical History:   Diagnosis Date    Anxiety     Aortic stenosis     Claustrophobia     Elevated liver enzymes     dx-  fatty liver    Hypertension     Impaired hearing     bilateral - no hearing aids    Lipid disorder     Numbness     left foot- secondary to previous  back injury     Postmenopausal     Sleep apnea     compliant with CPAP- nightly    Tendonitis     left shoulder - treatment with PT    Type 2 diabetes mellitus (CMS/HCC)     Vertigo     ongoing- intermittent with position changes- treatment with meclizine and nasal spray       Past Surgical History:   Procedure Laterality Date     SECTION      CHOLECYSTECTOMY      DILATION AND EVACUATION      ENDOMETRIAL ABLATION      GALLBLADDER SURGERY      HERNIA REPAIR      HYSTERECTOMY      KNEE ARTHROSCOPY Right     repair torn meniscus    TONSILLECTOMY    "      Social History     Tobacco Use    Smoking status: Never    Smokeless tobacco: Never   Vaping Use    Vaping Use: Never used   Substance Use Topics    Alcohol use: Not Currently     Comment: ocasional    Drug use: Never       Family History   Problem Relation Age of Onset    Hypertension Biological Mother     Diabetes Biological Mother     Heart attack Biological Father     No Known Problems Biological Sister     Hypertension Biological Brother     Diabetes Maternal Grandmother     Colon cancer Paternal Grandfather     No Known Problems Biological Daughter     Hyperlipidemia Biological Daughter     No Known Problems Biological Son      Allergies:  Amoxicillin-pot clavulanate, Sulfa (sulfonamide antibiotics), and Wellbutrin [bupropion hcl]    Current Outpatient Medications   Medication Sig Dispense Refill    acarbose (PRECOSE) 100 mg tablet TAKE 1 TAB ORAL 3 X DAILY AT START OF EACH MEAL 3 TABS DAILY 270 tablet 3    ammonium lactate (LAC-HYDRIN) 12 % lotion APPLY DAILY TO FEET      atorvastatin (LIPITOR) 20 mg tablet Take 1 tablet (20 mg total) by mouth nightly. 30 tablet 3    b complex vitamins tablet daily.      BD ENRIQUETA 2ND GEN PEN NEEDLE 32 gauge x 5/32\" needle USE FOR DAILY INJECTION 100 each 3    blood sugar diagnostic (CONTOUR NEXT TEST STRIPS) strip Test glucose once daily 100 strip 3    blood sugar diagnostic (ONETOUCH VERIO TEST STRIPS) strip TEST 3 TIMES A  strip 7    blood-glucose meter (ONETOUCH VERIO FLEX METER) misc CHECK GLUCOSE 3 TIMES ADAY 300 each 3    cholecalciferol, vitamin D3, 25 mcg (1,000 unit) capsule One tablet daily      folic acid (FOLVITE) 1 mg tablet Take 1 mg by mouth daily.      lancets (ONETOUCH DELICA LANCETS) 33 gauge misc Check glucose 3 times aday 200 each 3    lisinopriL (PRINIVIL) 10 mg tablet Take 1 tablet (10 mg total) by mouth daily.      metFORMIN (GLUCOPHAGE) 500 mg tablet Take 2 tablets (1,000 mg total) by mouth 2 (two) times a day with meals. 360 tablet 3    " omega-3 fatty acids-fish oil 300-1,000 mg capsule take 1 capsule daily      omeprazole (PriLOSEC) 20 mg capsule Take 20 mg by mouth daily before breakfast.      resvera/chrom/gr.tea/EGCG/dig3 (RESVERATROL DIET ORAL) Take by mouth.      semaglutide (OZEMPIC) 1 mg/dose (4 mg/3 mL) subcutaneous injection Inject 1 mg under the skin every (seven) 7 days. 3 mL 1    sertraline (ZOLOFT) 100 mg tablet Take 100 mg by mouth every evening.  2    verapamil SR (CALAN-SR) 120 mg CR tablet Take 1 tablet (120 mg total) by mouth nightly. 30 tablet 3    zinc sulfate (ZINCATE) 220 (50) mg capsule Take 220 mg by mouth daily.       No current facility-administered medications for this visit.     Review of Systems   Constitutional: Negative for malaise/fatigue and weight loss (- 3 lbs since 8/21/23).   Cardiovascular:  Negative for chest pain, dyspnea on exertion, irregular heartbeat, leg swelling, near-syncope, orthopnea, palpitations and syncope.   Hematologic/Lymphatic: Does not bruise/bleed easily.   Gastrointestinal:  Negative for diarrhea and heartburn.   Genitourinary:  Negative for hematuria.   Neurological:  Negative for dizziness and light-headedness.   Psychiatric/Behavioral:  The patient does not have insomnia.    All other systems reviewed and are negative.    Objective     Vitals:    06/06/24 0724   BP: 102/60   Pulse: 71   SpO2: 97%     Wt Readings from Last 5 Encounters:   06/06/24 87.5 kg (193 lb)   05/09/24 87.5 kg (193 lb)   04/02/24 87.5 kg (193 lb)   03/25/24 87.5 kg (193 lb)   02/07/24 87 kg (191 lb 12.8 oz)     Body mass index is 36.47 kg/m².    Physical Exam  Vitals and nursing note reviewed.   Constitutional:       Appearance: Normal appearance. She is well-developed.   HENT:      Head: Normocephalic and atraumatic.   Eyes:      General: No scleral icterus.  Neck:      Vascular: Carotid bruit present. No JVD.   Cardiovascular:      Rate and Rhythm: Normal rate and regular rhythm.      Pulses: Normal pulses and  intact distal pulses.           Carotid pulses are 2+ on the right side with bruit and 2+ on the left side with bruit.       Radial pulses are 2+ on the right side and 2+ on the left side.        Dorsalis pedis pulses are 2+ on the right side and 2+ on the left side.        Posterior tibial pulses are 2+ on the right side and 2+ on the left side.      Heart sounds: S1 normal. Murmur (III/VI RADHA at base that radiates towards bilateral carotids. Diminished S2.) heard.      No friction rub. No gallop. No S3 or S4 sounds.   Pulmonary:      Effort: Pulmonary effort is normal. No respiratory distress.      Breath sounds: Normal breath sounds. No stridor. No wheezing, rhonchi or rales.   Chest:      Chest wall: No tenderness.   Abdominal:      General: Bowel sounds are normal. There is no distension.      Palpations: Abdomen is soft. There is no mass.      Tenderness: There is no abdominal tenderness. There is no guarding or rebound.   Musculoskeletal:      Right lower leg: No edema.      Left lower leg: No edema.   Skin:     General: Skin is warm and dry.      Coloration: Skin is not pale.      Findings: No erythema or rash.   Neurological:      General: No focal deficit present.      Mental Status: She is alert and oriented to person, place, and time.   Psychiatric:         Mood and Affect: Mood normal.         Behavior: Behavior normal.         Thought Content: Thought content normal.         Judgment: Judgment normal.       ECG   sinus rhythm, I have independently reviewed the patient's ECG results.  Significant abnormals are :.  Normal sinus rhythm   Rightward axis   T wave abnormality, consider inferolateral ischemia   Abnormal ECG   When compared with ECG of 02-APR-2024 08:20,   Criteria for Septal infarct are no longer Present   T wave inversion less evident in Lateral leads   Confirmed by Alvaro Cooley ([id number]) on 6/6/2024 7:43:53 AM     Hematology  Lab Results   Component Value Date    WBC 6.7 12/15/2020    HGB  14.4 12/15/2020    HCT 43.9 12/15/2020     12/15/2020    INR 1.0 12/05/2016       Chemistries  Lab Results   Component Value Date     09/23/2023    K 4.5 09/23/2023     09/23/2023    CREATININE 0.80 09/23/2023    BUN 25 09/23/2023    CO2 25 09/23/2023    GLUCOSE 126 (H) 09/23/2023    CALCIUM 9.8 09/23/2023    MG 2.2 12/05/2016    ALT 97 (H) 09/09/2021    AST 60 (H) 09/09/2021       Cholesterol  Lab Results   Component Value Date    CHOL 135 03/13/2023    TRIG 135 03/13/2023    HDL 37 (L) 03/13/2023    LDLCALC 76 03/13/2023       Endocrine  Lab Results   Component Value Date    TSH 3.03 12/15/2020    FREET4 0.86 10/26/2015    HGBA1C 6.1 (H) 09/23/2023       Cardiac Imaging    TRANSTHORACIC ECHO (TTE) COMPLETE 02/07/2024    Interpretation Summary    Left Ventricle: Normal ventricle size. Mild concentric left ventricular hypertrophy. Moderate mid-cavity obstruction present. Resting gradient 32mmHg that increases to 66mmHg with valsalva. Hyperdynamic systolic function. Estimated EF 75%. Wall motion appears grossly normal. Normal septal motion. Grade I diastolic dysfunction. Diastolic inflow pattern consistent with impaired relaxation.    Left Atrium: Moderately dilated atrium.    Mitral Valve: Normal leaflet structure. Normal leaflet motion. Trace regurgitation.    Aortic Valve: Tricuspid valve.  Sclerotic leaflets. Trace regurgitation. Moderate to severe stenosis. The peak aortic velocity was measured in the right sternal border view. Peak velocity = 3.84 m/s. Mean gradient = 32.00 mmHg. Calculated area by cont eq = 1.04 cm2. Calculated dimensionless index = 0.34.    Aorta: Aortic root sclerotic. Ascending aorta sclerotic.    Right Atrium: Normal sized atrium.    Right Ventricle: Normal ventricle size. Normal systolic function.    Tricuspid Valve: Structure is grossly normal. Trace regurgitation. Estimated RVSP = 23 mmHg.    Assessment/Plan     Essential hypertension  - Target BP < 130/80  .  - BP  actually low today. Think its too low in setting of AS.   - Decrease Lisinopril 20mg  to 10mg q AM   - Continue Verapamil 120mg ER 120mg nightly     Valvular heart disease  - Echo 1/2023: Mild [facility]. Ef 70% Intracavitary gradient. GI DD. Moderate AS, peak 3.18m/s, mean 22mmHg, KASSY 1.52cm2. DI 0.40. Mild MR.   - Echo 2/7/24: Mild [facility]. Mod mid-cavitary obstruction, resting grad 32mmHg, increases to 66mmHg with valsalva. Hyperdynamic systolic function. EF 75% Mod-Severe AS, peak 3.84m/s, mean 32mmHg, KASSY 1.04cm2.   .  - Continue same medications  - Repeat echo with next visit.   - Continue Verapamil 120mg q PM and monitor for improvement in intracavitary gradients and mild Sx of SOB when walking uphill with dog.     Mixed hyperlipidemia  - target LDL <  70  - FLP 3/14/23:  , , HDL 37, LDL 76  - FLP 2/24/24: , , HDL 47, LDL 80 - Simvastatin 20mg  .  - Not quite at goal on most recent check  - Continue Atorvastatin 20mg q PM    Type 2 diabetes mellitus without complication, without long-term current use of insulin (CMS/[facility])  - continue statin and ace.     Follow Up Plans:  Return in about 6 months (around 12/6/2024).     MEDICATION CHANGES:  - Decrease Lisinopril 20mg to 10mg q AM        IHemant, am scribing for, and in the presence of, Alvaro Cooley DO.    IAlvaro DO, personally performed the services described in this documentation as scribed by Hemant Young in my presence, and it is both accurate and complete.       Alvaro Cooley DO  6/6/2024

## 2024-06-04 NOTE — ASSESSMENT & PLAN NOTE
- Target BP < 130/80  .  - BP actually low today. Think its too low in setting of AS.   - Decrease Lisinopril 20mg  to 10mg q AM   - Continue Verapamil 120mg ER 120mg nightly

## 2024-06-04 NOTE — ASSESSMENT & PLAN NOTE
- Echo 1/2023: Mild [facility]. Ef 70% Intracavitary gradient. GI DD. Moderate AS, peak 3.18m/s, mean 22mmHg, KASSY 1.52cm2. DI 0.40. Mild MR.   - Echo 2/7/24: Mild [facility]. Mod mid-cavitary obstruction, resting grad 32mmHg, increases to 66mmHg with valsalva. Hyperdynamic systolic function. EF 75% Mod-Severe AS, peak 3.84m/s, mean 32mmHg, KASSY 1.04cm2.   .  - Continue same medications  - Repeat echo with next visit.   - Continue Verapamil 120mg q PM and monitor for improvement in intracavitary gradients and mild Sx of SOB when walking uphill with dog.

## 2024-06-04 NOTE — ASSESSMENT & PLAN NOTE
- target LDL <  70  - FLP 3/14/23:  , , HDL 37, LDL 76  - FLP 2/24/24: , , HDL 47, LDL 80 - Simvastatin 20mg  .  - Not quite at goal on most recent check  - Continue Atorvastatin 20mg q PM  - Repeat FLP, given Rx.

## 2024-06-06 ENCOUNTER — OFFICE VISIT (OUTPATIENT)
Dept: CARDIOLOGY | Facility: CLINIC | Age: 69
End: 2024-06-06
Payer: MEDICARE

## 2024-06-06 VITALS
WEIGHT: 193 LBS | HEART RATE: 71 BPM | DIASTOLIC BLOOD PRESSURE: 60 MMHG | HEIGHT: 61 IN | BODY MASS INDEX: 36.44 KG/M2 | SYSTOLIC BLOOD PRESSURE: 102 MMHG | OXYGEN SATURATION: 97 %

## 2024-06-06 DIAGNOSIS — I38 VALVULAR HEART DISEASE: Chronic | ICD-10-CM

## 2024-06-06 DIAGNOSIS — I10 ESSENTIAL HYPERTENSION: Primary | Chronic | ICD-10-CM

## 2024-06-06 DIAGNOSIS — E11.9 TYPE 2 DIABETES MELLITUS WITHOUT COMPLICATION, WITHOUT LONG-TERM CURRENT USE OF INSULIN (CMS/HCC): ICD-10-CM

## 2024-06-06 DIAGNOSIS — E78.2 MIXED HYPERLIPIDEMIA: Chronic | ICD-10-CM

## 2024-06-06 LAB
ATRIAL RATE: 70
P AXIS: 84
PR INTERVAL: 162
QRS DURATION: 82
QT INTERVAL: 412
QTC CALCULATION(BAZETT): 444
R AXIS: 100
T WAVE AXIS: -67
VENTRICULAR RATE: 70

## 2024-06-06 PROCEDURE — 99214 OFFICE O/P EST MOD 30 MIN: CPT | Performed by: INTERNAL MEDICINE

## 2024-06-06 PROCEDURE — 93000 ELECTROCARDIOGRAM COMPLETE: CPT | Performed by: INTERNAL MEDICINE

## 2024-06-06 PROCEDURE — G8754 DIAS BP LESS 90: HCPCS | Performed by: INTERNAL MEDICINE

## 2024-06-06 PROCEDURE — G8752 SYS BP LESS 140: HCPCS | Performed by: INTERNAL MEDICINE

## 2024-06-06 RX ORDER — LISINOPRIL 10 MG/1
10 TABLET ORAL DAILY
COMMUNITY
Start: 2024-06-06 | End: 2024-06-11 | Stop reason: SDUPTHER

## 2024-06-06 ASSESSMENT — ENCOUNTER SYMPTOMS
BRUISES/BLEEDS EASILY: 0
WEIGHT LOSS: 0
HEARTBURN: 0
DYSPNEA ON EXERTION: 0
SYNCOPE: 0
NEAR-SYNCOPE: 0
DIARRHEA: 0
PALPITATIONS: 0
LIGHT-HEADEDNESS: 0
IRREGULAR HEARTBEAT: 0
DIZZINESS: 0
ORTHOPNEA: 0
HEMATURIA: 0
INSOMNIA: 0

## 2024-06-06 NOTE — PATIENT INSTRUCTIONS
Get repeat fasting labs done.     Endocrine - Dr. Chikis Kaye - [facility] - Dr. Radha Glass and Dr. Andree Mckeon     Media - Dr. Maggie Hernandez     Follow up in 6 months    Echo with next visit.     Decrease Lisinopril 20mg to 10mg - I will call in new Rx for Lisinopril 10mg capsules.

## 2024-06-06 NOTE — LETTER
June 6, 2024     Billy Antunez DO  [address] [facility] [address]    Patient: Citlali Gifford  YOB: 1955  Date of Visit: 6/6/2024      Dear Dr. Antunez:    Thank you for referring Citlali Gifford to me for evaluation. Below are my notes for this consultation.    If you have questions, please do not hesitate to call me. I look forward to following your patient along with you.         Sincerely,        Alvaro Cooley DO        CC: TERESA Del Rio William N, DO  6/6/2024  7:58 AM  Sign when Signing Visit       Cardiology  Office Progress Note       Reason for visit:   Chief Complaint   Patient presents with   • Follow-up     HPI    Citlali Gifford is a 68 y.o. female who presents to the office for cardiovascular follow up and management of  hypertension, dyslipidemia, aortic stenosis and mitral regurgitation.     She was last seen in the office on 4/2/24. At that visit, she remained active caring for her grandchildren. She stopped WW. She had lost an additional 3 lbs. She denied any cardiac complains.     I started her on Verapamil 120 mg nightly and switched her Simvastatin to Atorvastatin 20 mg q PM.    Today she reports feeling fine. She has tolerated the changes in her medications without any adverse reactions. She has remained active at home, walking her dog and babysitting 5 days per week without CV limitations. She notes constant fatigue and is unsure if it is related to the weather or her CPAP use. She is able to perform her normal activities without complications. She denies any chest pains, shortness of breath, palpitations, lightheadedness, dizziness or syncopal events.     Past Medical History:   Diagnosis Date   • Anxiety    • Aortic stenosis    • Claustrophobia    • Elevated liver enzymes     dx-  fatty liver   • Hypertension    • Impaired hearing     bilateral - no hearing aids   • Lipid disorder    • Numbness     left foot- secondary to  "previous  back injury    • Postmenopausal    • Sleep apnea     compliant with CPAP- nightly   • Tendonitis     left shoulder - treatment with PT   • Type 2 diabetes mellitus (CMS/HCC)    • Vertigo     ongoing- intermittent with position changes- treatment with meclizine and nasal spray       Past Surgical History:   Procedure Laterality Date   •  SECTION     • CHOLECYSTECTOMY     • DILATION AND EVACUATION     • ENDOMETRIAL ABLATION     • GALLBLADDER SURGERY     • HERNIA REPAIR     • HYSTERECTOMY     • KNEE ARTHROSCOPY Right     repair torn meniscus   • TONSILLECTOMY         Social History     Tobacco Use   • Smoking status: Never   • Smokeless tobacco: Never   Vaping Use   • Vaping Use: Never used   Substance Use Topics   • Alcohol use: Not Currently     Comment: ocasional   • Drug use: Never       Family History   Problem Relation Age of Onset   • Hypertension Biological Mother    • Diabetes Biological Mother    • Heart attack Biological Father    • No Known Problems Biological Sister    • Hypertension Biological Brother    • Diabetes Maternal Grandmother    • Colon cancer Paternal Grandfather    • No Known Problems Biological Daughter    • Hyperlipidemia Biological Daughter    • No Known Problems Biological Son      Allergies:  Amoxicillin-pot clavulanate, Sulfa (sulfonamide antibiotics), and Wellbutrin [bupropion hcl]    Current Outpatient Medications   Medication Sig Dispense Refill   • acarbose (PRECOSE) 100 mg tablet TAKE 1 TAB ORAL 3 X DAILY AT START OF EACH MEAL 3 TABS DAILY 270 tablet 3   • ammonium lactate (LAC-HYDRIN) 12 % lotion APPLY DAILY TO FEET     • atorvastatin (LIPITOR) 20 mg tablet Take 1 tablet (20 mg total) by mouth nightly. 30 tablet 3   • b complex vitamins tablet daily.     • BD ENRIQUETA 2ND GEN PEN NEEDLE 32 gauge x \" needle USE FOR DAILY INJECTION 100 each 3   • blood sugar diagnostic (CONTOUR NEXT TEST STRIPS) strip Test glucose once daily 100 strip 3   • blood sugar diagnostic " (ONETOUCH VERIO TEST STRIPS) strip TEST 3 TIMES A  strip 7   • blood-glucose meter (ONETOUCH VERIO FLEX METER) [facility] CHECK GLUCOSE 3 TIMES ADAY 300 each 3   • cholecalciferol, vitamin D3, 25 mcg (1,000 unit) capsule One tablet daily     • folic acid (FOLVITE) 1 mg tablet Take 1 mg by mouth daily.     • lancets (ONETOUCH DELICA LANCETS) 33 gauge misc Check glucose 3 times aday 200 each 3   • lisinopriL (PRINIVIL) 10 mg tablet Take 1 tablet (10 mg total) by mouth daily.     • metFORMIN (GLUCOPHAGE) 500 mg tablet Take 2 tablets (1,000 mg total) by mouth 2 (two) times a day with meals. 360 tablet 3   • omega-3 fatty acids-fish oil 300-1,000 mg capsule take 1 capsule daily     • omeprazole (PriLOSEC) 20 mg capsule Take 20 mg by mouth daily before breakfast.     • resvera/chrom/gr.tea/EGCG/dig3 (RESVERATROL DIET ORAL) Take by mouth.     • semaglutide (OZEMPIC) 1 mg/dose (4 mg/3 mL) subcutaneous injection Inject 1 mg under the skin every (seven) 7 days. 3 mL 1   • sertraline (ZOLOFT) 100 mg tablet Take 100 mg by mouth every evening.  2   • verapamil SR (CALAN-SR) 120 mg CR tablet Take 1 tablet (120 mg total) by mouth nightly. 30 tablet 3   • zinc sulfate (ZINCATE) 220 (50) mg capsule Take 220 mg by mouth daily.       No current facility-administered medications for this visit.     Review of Systems   Constitutional: Negative for malaise/fatigue and weight loss (- 3 lbs since 8/21/23).   Cardiovascular:  Negative for chest pain, dyspnea on exertion, irregular heartbeat, leg swelling, near-syncope, orthopnea, palpitations and syncope.   Hematologic/Lymphatic: Does not bruise/bleed easily.   Gastrointestinal:  Negative for diarrhea and heartburn.   Genitourinary:  Negative for hematuria.   Neurological:  Negative for dizziness and light-headedness.   Psychiatric/Behavioral:  The patient does not have insomnia.    All other systems reviewed and are negative.    Objective    Vitals:    06/06/24 0724   BP: 102/60   Pulse:  71   SpO2: 97%     Wt Readings from Last 5 Encounters:   06/06/24 87.5 kg (193 lb)   05/09/24 87.5 kg (193 lb)   04/02/24 87.5 kg (193 lb)   03/25/24 87.5 kg (193 lb)   02/07/24 87 kg (191 lb 12.8 oz)     Body mass index is 36.47 kg/m².    Physical Exam  Vitals and nursing note reviewed.   Constitutional:       Appearance: Normal appearance. She is well-developed.   HENT:      Head: Normocephalic and atraumatic.   Eyes:      General: No scleral icterus.  Neck:      Vascular: Carotid bruit present. No JVD.   Cardiovascular:      Rate and Rhythm: Normal rate and regular rhythm.      Pulses: Normal pulses and intact distal pulses.           Carotid pulses are 2+ on the right side with bruit and 2+ on the left side with bruit.       Radial pulses are 2+ on the right side and 2+ on the left side.        Dorsalis pedis pulses are 2+ on the right side and 2+ on the left side.        Posterior tibial pulses are 2+ on the right side and 2+ on the left side.      Heart sounds: S1 normal. Murmur (III/VI RADHA at base that radiates towards bilateral carotids. Diminished S2.) heard.      No friction rub. No gallop. No S3 or S4 sounds.   Pulmonary:      Effort: Pulmonary effort is normal. No respiratory distress.      Breath sounds: Normal breath sounds. No stridor. No wheezing, rhonchi or rales.   Chest:      Chest wall: No tenderness.   Abdominal:      General: Bowel sounds are normal. There is no distension.      Palpations: Abdomen is soft. There is no mass.      Tenderness: There is no abdominal tenderness. There is no guarding or rebound.   Musculoskeletal:      Right lower leg: No edema.      Left lower leg: No edema.   Skin:     General: Skin is warm and dry.      Coloration: Skin is not pale.      Findings: No erythema or rash.   Neurological:      General: No focal deficit present.      Mental Status: She is alert and oriented to person, place, and time.   Psychiatric:         Mood and Affect: Mood normal.          Behavior: Behavior normal.         Thought Content: Thought content normal.         Judgment: Judgment normal.       ECG   sinus rhythm, I have independently reviewed the patient's ECG results.  Significant abnormals are :.  Normal sinus rhythm   Rightward axis   T wave abnormality, consider inferolateral ischemia   Abnormal ECG   When compared with ECG of 02-APR-2024 08:20,   Criteria for Septal infarct are no longer Present   T wave inversion less evident in Lateral leads   Confirmed by Alvaro Cooley ([id number]) on 6/6/2024 7:43:53 AM     Hematology  Lab Results   Component Value Date    WBC 6.7 12/15/2020    HGB 14.4 12/15/2020    HCT 43.9 12/15/2020     12/15/2020    INR 1.0 12/05/2016       Chemistries  Lab Results   Component Value Date     09/23/2023    K 4.5 09/23/2023     09/23/2023    CREATININE 0.80 09/23/2023    BUN 25 09/23/2023    CO2 25 09/23/2023    GLUCOSE 126 (H) 09/23/2023    CALCIUM 9.8 09/23/2023    MG 2.2 12/05/2016    ALT 97 (H) 09/09/2021    AST 60 (H) 09/09/2021       Cholesterol  Lab Results   Component Value Date    CHOL 135 03/13/2023    TRIG 135 03/13/2023    HDL 37 (L) 03/13/2023    LDLCALC 76 03/13/2023       Endocrine  Lab Results   Component Value Date    TSH 3.03 12/15/2020    FREET4 0.86 10/26/2015    HGBA1C 6.1 (H) 09/23/2023       Cardiac Imaging    TRANSTHORACIC ECHO (TTE) COMPLETE 02/07/2024    Interpretation Summary  •  Left Ventricle: Normal ventricle size. Mild concentric left ventricular hypertrophy. Moderate mid-cavity obstruction present. Resting gradient 32mmHg that increases to 66mmHg with valsalva. Hyperdynamic systolic function. Estimated EF 75%. Wall motion appears grossly normal. Normal septal motion. Grade I diastolic dysfunction. Diastolic inflow pattern consistent with impaired relaxation.  •  Left Atrium: Moderately dilated atrium.  •  Mitral Valve: Normal leaflet structure. Normal leaflet motion. Trace regurgitation.  •  Aortic Valve: Tricuspid  valve.  Sclerotic leaflets. Trace regurgitation. Moderate to severe stenosis. The peak aortic velocity was measured in the right sternal border view. Peak velocity = 3.84 m/s. Mean gradient = 32.00 mmHg. Calculated area by cont eq = 1.04 cm2. Calculated dimensionless index = 0.34.  •  Aorta: Aortic root sclerotic. Ascending aorta sclerotic.  •  Right Atrium: Normal sized atrium.  •  Right Ventricle: Normal ventricle size. Normal systolic function.  •  Tricuspid Valve: Structure is grossly normal. Trace regurgitation. Estimated RVSP = 23 mmHg.    Assessment/Plan    Essential hypertension  - Target BP < 130/80  .  - BP actually low today. Think its too low in setting of AS.   - Decrease Lisinopril 20mg  to 10mg q AM   - Continue Verapamil 120mg ER 120mg nightly     Valvular heart disease  - Echo 1/2023: Mild [facility]. Ef 70% Intracavitary gradient. GI DD. Moderate AS, peak 3.18m/s, mean 22mmHg, KASSY 1.52cm2. DI 0.40. Mild MR.   - Echo 2/7/24: Mild [facility]. Mod mid-cavitary obstruction, resting grad 32mmHg, increases to 66mmHg with valsalva. Hyperdynamic systolic function. EF 75% Mod-Severe AS, peak 3.84m/s, mean 32mmHg, KASSY 1.04cm2.   .  - Continue same medications  - Repeat echo with next visit.   - Continue Verapamil 120mg q PM and monitor for improvement in intracavitary gradients and mild Sx of SOB when walking uphill with dog.     Mixed hyperlipidemia  - target LDL <  70  - FLP 3/14/23:  , , HDL 37, LDL 76  - FLP 2/24/24: , , HDL 47, LDL 80 - Simvastatin 20mg  .  - Not quite at goal on most recent check  - Continue Atorvastatin 20mg q PM    Type 2 diabetes mellitus without complication, without long-term current use of insulin (CMS/[facility])  - continue statin and ace.     Follow Up Plans:  Return in about 6 months (around 12/6/2024).     MEDICATION CHANGES:  - Decrease Lisinopril 20mg to 10mg q AM        IHemant, am scribing for, and in the presence of, Alvaro Cooley DO.    IAlvaro  DO Yasir, personally performed the services described in this documentation as scribed by Hemant Young in my presence, and it is both accurate and complete.       Alvaro Cooley DO  6/6/2024

## 2024-06-11 RX ORDER — LISINOPRIL 10 MG/1
10 TABLET ORAL DAILY
Qty: 90 TABLET | Refills: 0 | Status: SHIPPED | OUTPATIENT
Start: 2024-06-11 | End: 2024-09-04

## 2024-06-11 NOTE — TELEPHONE ENCOUNTER
"Medicine Refill Request [facility]    Name of Patient: Citlali Gifford    Caller's name/Relationship: Citlali Gifford      Callback number:   [phone]      Medication Name, Dosage, Supply:     lisinopriL (PRINIVIL) 10 mg tablet         Quantity left: N/A    Pharmacy: [id number]    Last Office Visit: 6/6/24  Last Consult Visit: Visit date not found  Last Telemedicine Visit: Visit date not found    Next Appointment: Visit date not found      Current Outpatient Medications:     acarbose (PRECOSE) 100 mg tablet, TAKE 1 TAB ORAL 3 X DAILY AT START OF EACH MEAL 3 TABS DAILY, Disp: 270 tablet, Rfl: 3    ammonium lactate (LAC-HYDRIN) 12 % lotion, APPLY DAILY TO FEET, Disp: , Rfl:     atorvastatin (LIPITOR) 20 mg tablet, Take 1 tablet (20 mg total) by mouth nightly., Disp: 30 tablet, Rfl: 3    b complex vitamins tablet, daily., Disp: , Rfl:     BD ENRIQUETA 2ND GEN PEN NEEDLE 32 gauge x 5/32\" needle, USE FOR DAILY INJECTION, Disp: 100 each, Rfl: 3    blood sugar diagnostic (CONTOUR NEXT TEST STRIPS) strip, Test glucose once daily, Disp: 100 strip, Rfl: 3    blood sugar diagnostic (ONETOUCH VERIO TEST STRIPS) strip, TEST 3 TIMES A DAY, Disp: 100 strip, Rfl: 7    blood-glucose meter (ONETOUCH VERIO FLEX METER) misc, CHECK GLUCOSE 3 TIMES ADAY, Disp: 300 each, Rfl: 3    cholecalciferol, vitamin D3, 25 mcg (1,000 unit) capsule, One tablet daily, Disp: , Rfl:     folic acid (FOLVITE) 1 mg tablet, Take 1 mg by mouth daily., Disp: , Rfl:     lancets (ONETOUCH DELICA LANCETS) 33 gauge misc, Check glucose 3 times aday, Disp: 200 each, Rfl: 3    lisinopriL (PRINIVIL) 10 mg tablet, Take 1 tablet (10 mg total) by mouth daily., Disp: , Rfl:     metFORMIN (GLUCOPHAGE) 500 mg tablet, Take 2 tablets (1,000 mg total) by mouth 2 (two) times a day with meals., Disp: 360 tablet, Rfl: 3    omega-3 fatty acids-fish oil 300-1,000 mg capsule, take 1 capsule daily, Disp: , Rfl:     omeprazole (PriLOSEC) 20 mg capsule, Take 20 mg by mouth daily before " breakfast., Disp: , Rfl:     resvera/chrom/gr.tea/EGCG/dig3 (RESVERATROL DIET ORAL), Take by mouth., Disp: , Rfl:     semaglutide (OZEMPIC) 1 mg/dose (4 mg/3 mL) subcutaneous injection, Inject 1 mg under the skin every (seven) 7 days., Disp: 3 mL, Rfl: 1    sertraline (ZOLOFT) 100 mg tablet, Take 100 mg by mouth every evening., Disp: , Rfl: 2    verapamil SR (CALAN-SR) 120 mg CR tablet, Take 1 tablet (120 mg total) by mouth nightly., Disp: 30 tablet, Rfl: 3    zinc sulfate (ZINCATE) 220 (50) mg capsule, Take 220 mg by mouth daily., Disp: , Rfl:       BP Readings from Last 3 Encounters:   06/06/24 102/60   05/09/24 112/70   04/02/24 124/70       Recent Lab results:  Lab Results   Component Value Date    CHOL 135 03/13/2023   ,   Lab Results   Component Value Date    HDL 37 (L) 03/13/2023   ,   Lab Results   Component Value Date    LDLCALC 76 03/13/2023   ,   Lab Results   Component Value Date    TRIG 135 03/13/2023        Lab Results   Component Value Date    GLUCOSE 126 (H) 09/23/2023   ,   Lab Results   Component Value Date    HGBA1C 6.1 (H) 09/23/2023         Lab Results   Component Value Date    CREATININE 0.80 09/23/2023       Lab Results   Component Value Date    TSH 3.03 12/15/2020

## 2024-06-18 ENCOUNTER — TELEPHONE (OUTPATIENT)
Dept: OBSTETRICS AND GYNECOLOGY | Facility: CLINIC | Age: 69
End: 2024-06-18

## 2024-06-18 NOTE — TELEPHONE ENCOUNTER
Pt called said she took the medication you gave her for a yeast infection. She's said it have improved but she still has a little itch . Wants to know should she leave it alone or come in for an appointment ?

## 2024-06-27 ENCOUNTER — OFFICE VISIT (OUTPATIENT)
Dept: OBSTETRICS AND GYNECOLOGY | Facility: CLINIC | Age: 69
End: 2024-06-27
Payer: MEDICARE

## 2024-06-27 VITALS
DIASTOLIC BLOOD PRESSURE: 68 MMHG | SYSTOLIC BLOOD PRESSURE: 124 MMHG | BODY MASS INDEX: 36.06 KG/M2 | HEIGHT: 61 IN | WEIGHT: 191 LBS

## 2024-06-27 DIAGNOSIS — E78.9 LIPID DISORDER: Primary | ICD-10-CM

## 2024-06-27 DIAGNOSIS — N89.8 VAGINAL DISCHARGE: ICD-10-CM

## 2024-06-27 DIAGNOSIS — N76.0 VAGINITIS AND VULVOVAGINITIS: ICD-10-CM

## 2024-06-27 PROCEDURE — G8754 DIAS BP LESS 90: HCPCS | Performed by: OBSTETRICS & GYNECOLOGY

## 2024-06-27 PROCEDURE — 99213 OFFICE O/P EST LOW 20 MIN: CPT | Performed by: OBSTETRICS & GYNECOLOGY

## 2024-06-27 PROCEDURE — G8752 SYS BP LESS 140: HCPCS | Performed by: OBSTETRICS & GYNECOLOGY

## 2024-06-27 RX ORDER — CLOTRIMAZOLE AND BETAMETHASONE DIPROPIONATE 10; .64 MG/G; MG/G
CREAM TOPICAL AS NEEDED
Qty: 30 G | Refills: 2 | Status: SHIPPED | OUTPATIENT
Start: 2024-06-27

## 2024-06-27 RX ORDER — FLUCONAZOLE 100 MG/1
100 TABLET ORAL DAILY
Qty: 3 TABLET | Refills: 0 | Status: SHIPPED | OUTPATIENT
Start: 2024-06-27 | End: 2024-06-30

## 2024-06-27 NOTE — PROGRESS NOTES
68 years old here today for the evaluation of vaginitis she took ciprofloxacin for an ear infection in May utilized Terazol 3 and Monistat 3 she finished these approximately 3 weeks ago she still has some itch and irritation    Examination shows slightly reddened vulva culture taken patient started on Diflucan 100 mg to be taken 1 a day for 3 days along with that Lotrisone cream

## 2024-06-28 LAB
BV BACTERIA RRNA VAG QL NAA+PROBE: NEGATIVE
C GLABRATA RNA VAG QL NAA+PROBE: NOT DETECTED
C TRACH RRNA SPEC QL NAA+PROBE: NOT DETECTED
CANDIDA RRNA VAG QL PROBE: NOT DETECTED
N GONORRHOEA RRNA SPEC QL NAA+PROBE: NOT DETECTED
T VAGINALIS RRNA SPEC QL NAA+PROBE: NOT DETECTED

## 2024-07-01 ENCOUNTER — TELEPHONE (OUTPATIENT)
Dept: ENDOCRINOLOGY | Facility: CLINIC | Age: 69
End: 2024-07-01
Payer: MEDICARE

## 2024-07-13 ENCOUNTER — HOSPITAL ENCOUNTER (OUTPATIENT)
Dept: RADIOLOGY | Age: 69
Discharge: HOME | End: 2024-07-13
Attending: OBSTETRICS & GYNECOLOGY
Payer: MEDICARE

## 2024-07-13 DIAGNOSIS — Z12.31 ENCOUNTER FOR SCREENING MAMMOGRAM FOR MALIGNANT NEOPLASM OF BREAST: ICD-10-CM

## 2024-07-13 PROCEDURE — 77063 BREAST TOMOSYNTHESIS BI: CPT

## 2024-07-17 DIAGNOSIS — E11.65 TYPE 2 DIABETES MELLITUS WITH HYPERGLYCEMIA, WITHOUT LONG-TERM CURRENT USE OF INSULIN (CMS/HCC): ICD-10-CM

## 2024-07-17 RX ORDER — SEMAGLUTIDE 1.34 MG/ML
INJECTION, SOLUTION SUBCUTANEOUS
Qty: 3 ML | Refills: 1 | Status: SHIPPED | OUTPATIENT
Start: 2024-07-17

## 2024-07-17 NOTE — TELEPHONE ENCOUNTER
Last Office Visit: 3/25/2024  Last Telemedicine Visit: Visit date not found    Next Office Visit: Visit date not found  Next Telemedicine Visit: Visit date not found     [facility] 2/24/24

## 2024-07-19 DIAGNOSIS — I10 ESSENTIAL HYPERTENSION: Chronic | ICD-10-CM

## 2024-07-19 RX ORDER — HYDROCHLOROTHIAZIDE 25 MG/1
120 TABLET ORAL NIGHTLY
Qty: 30 TABLET | Refills: 1 | Status: SHIPPED | OUTPATIENT
Start: 2024-07-19 | End: 2024-09-09 | Stop reason: SDUPTHER

## 2024-07-19 NOTE — TELEPHONE ENCOUNTER
"[facility] [place]  Medicine Refill Request      MA Notes:      Nurse Notes:      Last Office Visit: 6/6/2024  Last Telemedicine Visit: Visit date not found    Next Office Visit: 12/23/2024  Next Telemedicine Visit: Visit date not found     Most Recent BP Readings:  BP Readings from Last 3 Encounters:   06/27/24 124/68   06/06/24 102/60   05/09/24 112/70       Most recent Lab results:  Lab Results   Component Value Date    CHOL 135 03/13/2023    HDL 37 (L) 03/13/2023    TRIG 135 03/13/2023       Lab Results   Component Value Date    AST 60 (H) 09/09/2021    ALT 97 (H) 09/09/2021       Lab Results   Component Value Date     09/23/2023    K 4.5 09/23/2023    BUN 25 09/23/2023    CREATININE 0.80 09/23/2023       Current Medications:    Current Outpatient Medications:     acarbose (PRECOSE) 100 mg tablet, TAKE 1 TAB ORAL 3 X DAILY AT START OF EACH MEAL 3 TABS DAILY, Disp: 270 tablet, Rfl: 3    ammonium lactate (LAC-HYDRIN) 12 % lotion, APPLY DAILY TO FEET, Disp: , Rfl:     atorvastatin (LIPITOR) 20 mg tablet, Take 1 tablet (20 mg total) by mouth nightly., Disp: 30 tablet, Rfl: 3    b complex vitamins tablet, daily., Disp: , Rfl:     BD ENRIQUETA 2ND GEN PEN NEEDLE 32 gauge x 5/32\" needle, USE FOR DAILY INJECTION, Disp: 100 each, Rfl: 3    blood sugar diagnostic (CONTOUR NEXT TEST STRIPS) strip, Test glucose once daily, Disp: 100 strip, Rfl: 3    blood sugar diagnostic (ONETOUCH VERIO TEST STRIPS) strip, TEST 3 TIMES A DAY, Disp: 100 strip, Rfl: 7    blood-glucose meter (ONETOUCH VERIO FLEX METER) [facility], CHECK GLUCOSE 3 TIMES ADAY, Disp: 300 each, Rfl: 3    cholecalciferol, vitamin D3, 25 mcg (1,000 unit) capsule, One tablet daily, Disp: , Rfl:     clotrimazole-betamethasone (LOTRISONE) 1-0.05 % cream, Apply topically as needed for itching., Disp: 30 g, Rfl: 2    folic acid (FOLVITE) 1 mg tablet, Take 1 mg by mouth daily., Disp: , Rfl:     lancets (ONETOUCH DELICA LANCETS) 33 gauge misc, Check glucose 3 times " lucian, Disp: 200 each, Rfl: 3    lisinopriL (PRINIVIL) 10 mg tablet, Take 1 tablet (10 mg total) by mouth daily., Disp: 90 tablet, Rfl: 0    metFORMIN (GLUCOPHAGE) 500 mg tablet, Take 2 tablets (1,000 mg total) by mouth 2 (two) times a day with meals., Disp: 360 tablet, Rfl: 3    omega-3 fatty acids-fish oil 300-1,000 mg capsule, take 1 capsule daily, Disp: , Rfl:     omeprazole (PriLOSEC) 20 mg capsule, Take 20 mg by mouth daily before breakfast., Disp: , Rfl:     OZEMPIC 1 mg/dose (4 mg/3 mL) subcutaneous injection, INJECT 1 MG SUBCUTANEOUSLY EVERY 7 DAYS, Disp: 3 mL, Rfl: 1    resvera/chrom/gr.tea/EGCG/dig3 (RESVERATROL DIET ORAL), Take by mouth., Disp: , Rfl:     sertraline (ZOLOFT) 100 mg tablet, Take 100 mg by mouth every evening., Disp: , Rfl: 2    verapamil SR (CALAN-SR) 120 mg CR tablet, Take 1 tablet (120 mg total) by mouth nightly., Disp: 30 tablet, Rfl: 3    zinc sulfate (ZINCATE) 220 (50) mg capsule, Take 220 mg by mouth daily., Disp: , Rfl:

## 2024-08-03 DIAGNOSIS — E78.2 MIXED HYPERLIPIDEMIA: Chronic | ICD-10-CM

## 2024-08-05 RX ORDER — ATORVASTATIN CALCIUM 20 MG/1
20 TABLET, FILM COATED ORAL SEE ADMIN INSTRUCTIONS
Qty: 30 TABLET | Refills: 6 | Status: SHIPPED | OUTPATIENT
Start: 2024-08-05 | End: 2025-02-24

## 2024-08-05 NOTE — TELEPHONE ENCOUNTER
"[facility] [place]  Medicine Refill Request      MA Notes:      Nurse Notes:      Last Office Visit: 6/6/2024  Last Telemedicine Visit: Visit date not found    Next Office Visit: 12/23/2024  Next Telemedicine Visit: Visit date not found     Most Recent BP Readings:  BP Readings from Last 3 Encounters:   06/27/24 124/68   06/06/24 102/60   05/09/24 112/70       Most recent Lab results:  Lab Results   Component Value Date    CHOL 135 03/13/2023    HDL 37 (L) 03/13/2023    TRIG 135 03/13/2023       Lab Results   Component Value Date    AST 60 (H) 09/09/2021    ALT 97 (H) 09/09/2021       Lab Results   Component Value Date     09/23/2023    K 4.5 09/23/2023    BUN 25 09/23/2023    CREATININE 0.80 09/23/2023       Current Medications:    Current Outpatient Medications:     acarbose (PRECOSE) 100 mg tablet, TAKE 1 TAB ORAL 3 X DAILY AT START OF EACH MEAL 3 TABS DAILY, Disp: 270 tablet, Rfl: 3    ammonium lactate (LAC-HYDRIN) 12 % lotion, APPLY DAILY TO FEET, Disp: , Rfl:     atorvastatin (LIPITOR) 20 mg tablet, Take 1 tablet (20 mg total) by mouth nightly., Disp: 30 tablet, Rfl: 3    b complex vitamins tablet, daily., Disp: , Rfl:     BD ENRIQUETA 2ND GEN PEN NEEDLE 32 gauge x 5/32\" needle, USE FOR DAILY INJECTION, Disp: 100 each, Rfl: 3    blood sugar diagnostic (CONTOUR NEXT TEST STRIPS) strip, Test glucose once daily, Disp: 100 strip, Rfl: 3    blood sugar diagnostic (ONETOUCH VERIO TEST STRIPS) strip, TEST 3 TIMES A DAY, Disp: 100 strip, Rfl: 7    blood-glucose meter (ONETOUCH VERIO FLEX METER) [facility], CHECK GLUCOSE 3 TIMES ADAY, Disp: 300 each, Rfl: 3    cholecalciferol, vitamin D3, 25 mcg (1,000 unit) capsule, One tablet daily, Disp: , Rfl:     clotrimazole-betamethasone (LOTRISONE) 1-0.05 % cream, Apply topically as needed for itching., Disp: 30 g, Rfl: 2    folic acid (FOLVITE) 1 mg tablet, Take 1 mg by mouth daily., Disp: , Rfl:     lancets (ONETOUCH DELICA LANCETS) 33 gauge misc, Check glucose 3 times " lucian, Disp: 200 each, Rfl: 3    lisinopriL (PRINIVIL) 10 mg tablet, Take 1 tablet (10 mg total) by mouth daily., Disp: 90 tablet, Rfl: 0    metFORMIN (GLUCOPHAGE) 500 mg tablet, Take 2 tablets (1,000 mg total) by mouth 2 (two) times a day with meals., Disp: 360 tablet, Rfl: 3    omega-3 fatty acids-fish oil 300-1,000 mg capsule, take 1 capsule daily, Disp: , Rfl:     omeprazole (PriLOSEC) 20 mg capsule, Take 20 mg by mouth daily before breakfast., Disp: , Rfl:     OZEMPIC 1 mg/dose (4 mg/3 mL) subcutaneous injection, INJECT 1 MG SUBCUTANEOUSLY EVERY 7 DAYS, Disp: 3 mL, Rfl: 1    resvera/chrom/gr.tea/EGCG/dig3 (RESVERATROL DIET ORAL), Take by mouth., Disp: , Rfl:     sertraline (ZOLOFT) 100 mg tablet, Take 100 mg by mouth every evening., Disp: , Rfl: 2    verapamil SR (CALAN-SR) 120 mg CR tablet, TAKE 1 TABLET BY MOUTH NIGHTLY, Disp: 30 tablet, Rfl: 1    zinc sulfate (ZINCATE) 220 (50) mg capsule, Take 220 mg by mouth daily., Disp: , Rfl:

## 2024-09-04 RX ORDER — LISINOPRIL 10 MG/1
10 TABLET ORAL DAILY
Qty: 90 TABLET | Refills: 1 | Status: SHIPPED | OUTPATIENT
Start: 2024-09-04 | End: 2025-02-26

## 2024-09-04 NOTE — TELEPHONE ENCOUNTER
"[facility] [place]  Medicine Refill Request      MA Notes:      Nurse Notes:      Last Office Visit: 6/6/2024  Last Telemedicine Visit: Visit date not found    Next Office Visit: 12/23/2024  Next Telemedicine Visit: Visit date not found     Most Recent BP Readings:  BP Readings from Last 3 Encounters:   06/27/24 124/68   06/06/24 102/60   05/09/24 112/70       Most recent Lab results:  Lab Results   Component Value Date    CHOL 135 03/13/2023    HDL 37 (L) 03/13/2023    TRIG 135 03/13/2023       Lab Results   Component Value Date    AST 60 (H) 09/09/2021    ALT 97 (H) 09/09/2021       Lab Results   Component Value Date     09/23/2023    K 4.5 09/23/2023    BUN 25 09/23/2023    CREATININE 0.80 09/23/2023       Current Medications:    Current Outpatient Medications:     acarbose (PRECOSE) 100 mg tablet, TAKE 1 TAB ORAL 3 X DAILY AT START OF EACH MEAL 3 TABS DAILY, Disp: 270 tablet, Rfl: 3    ammonium lactate (LAC-HYDRIN) 12 % lotion, APPLY DAILY TO FEET, Disp: , Rfl:     atorvastatin (LIPITOR) 20 mg tablet, TAKE 1 TABLET BY MOUTH EVERY DAY AT NIGHT, Disp: 30 tablet, Rfl: 6    b complex vitamins tablet, daily., Disp: , Rfl:     BD ENRIQUETA 2ND GEN PEN NEEDLE 32 gauge x 5/32\" needle, USE FOR DAILY INJECTION, Disp: 100 each, Rfl: 3    blood sugar diagnostic (CONTOUR NEXT TEST STRIPS) strip, Test glucose once daily, Disp: 100 strip, Rfl: 3    blood sugar diagnostic (ONETOUCH VERIO TEST STRIPS) strip, TEST 3 TIMES A DAY, Disp: 100 strip, Rfl: 7    blood-glucose meter (ONETOUCH VERIO FLEX METER) [facility], CHECK GLUCOSE 3 TIMES ADAY, Disp: 300 each, Rfl: 3    cholecalciferol, vitamin D3, 25 mcg (1,000 unit) capsule, One tablet daily, Disp: , Rfl:     clotrimazole-betamethasone (LOTRISONE) 1-0.05 % cream, Apply topically as needed for itching., Disp: 30 g, Rfl: 2    folic acid (FOLVITE) 1 mg tablet, Take 1 mg by mouth daily., Disp: , Rfl:     lancets (ONETOUCH DELICA LANCETS) 33 gauge misc, Check glucose 3 times " lucian, Disp: 200 each, Rfl: 3    lisinopriL (PRINIVIL) 10 mg tablet, Take 1 tablet (10 mg total) by mouth daily., Disp: 90 tablet, Rfl: 0    metFORMIN (GLUCOPHAGE) 500 mg tablet, Take 2 tablets (1,000 mg total) by mouth 2 (two) times a day with meals., Disp: 360 tablet, Rfl: 3    omega-3 fatty acids-fish oil 300-1,000 mg capsule, take 1 capsule daily, Disp: , Rfl:     omeprazole (PriLOSEC) 20 mg capsule, Take 20 mg by mouth daily before breakfast., Disp: , Rfl:     OZEMPIC 1 mg/dose (4 mg/3 mL) subcutaneous injection, INJECT 1 MG SUBCUTANEOUSLY EVERY 7 DAYS, Disp: 3 mL, Rfl: 1    resvera/chrom/gr.tea/EGCG/dig3 (RESVERATROL DIET ORAL), Take by mouth., Disp: , Rfl:     sertraline (ZOLOFT) 100 mg tablet, Take 100 mg by mouth every evening., Disp: , Rfl: 2    verapamil SR (CALAN-SR) 120 mg CR tablet, TAKE 1 TABLET BY MOUTH NIGHTLY, Disp: 30 tablet, Rfl: 1    zinc sulfate (ZINCATE) 220 (50) mg capsule, Take 220 mg by mouth daily., Disp: , Rfl:

## 2024-09-09 DIAGNOSIS — I10 ESSENTIAL HYPERTENSION: Chronic | ICD-10-CM

## 2024-09-09 RX ORDER — HYDROCHLOROTHIAZIDE 25 MG/1
120 TABLET ORAL NIGHTLY
Qty: 90 TABLET | Refills: 3 | Status: SHIPPED | OUTPATIENT
Start: 2024-09-09

## 2024-09-09 NOTE — TELEPHONE ENCOUNTER
"[facility] at [facility] Refill Request      MA Notes:      Nurse Notes:      Last Office Visit: 4/2/2024  Last Telemedicine Visit: Visit date not found    Next Office Visit: Visit date not found  Next Telemedicine Visit: Visit date not found     Most Recent BP Readings:  BP Readings from Last 3 Encounters:   06/27/24 124/68   06/06/24 102/60   05/09/24 112/70       Most recent Lab results:  Lab Results   Component Value Date    CHOL 135 03/13/2023    HDL 37 (L) 03/13/2023    TRIG 135 03/13/2023       Lab Results   Component Value Date    AST 60 (H) 09/09/2021    ALT 97 (H) 09/09/2021       Lab Results   Component Value Date     09/23/2023    K 4.5 09/23/2023    BUN 25 09/23/2023    CREATININE 0.80 09/23/2023       Current Medications:    Current Outpatient Medications:     acarbose (PRECOSE) 100 mg tablet, TAKE 1 TAB ORAL 3 X DAILY AT START OF EACH MEAL 3 TABS DAILY, Disp: 270 tablet, Rfl: 3    ammonium lactate (LAC-HYDRIN) 12 % lotion, APPLY DAILY TO FEET, Disp: , Rfl:     atorvastatin (LIPITOR) 20 mg tablet, TAKE 1 TABLET BY MOUTH EVERY DAY AT NIGHT, Disp: 30 tablet, Rfl: 6    b complex vitamins tablet, daily., Disp: , Rfl:     BD ENRIQUETA 2ND GEN PEN NEEDLE 32 gauge x 5/32\" needle, USE FOR DAILY INJECTION, Disp: 100 each, Rfl: 3    blood sugar diagnostic (CONTOUR NEXT TEST STRIPS) strip, Test glucose once daily, Disp: 100 strip, Rfl: 3    blood sugar diagnostic (ONETOUCH VERIO TEST STRIPS) strip, TEST 3 TIMES A DAY, Disp: 100 strip, Rfl: 7    blood-glucose meter (ONETOUCH VERIO FLEX METER) [facility] GLUCOSE 3 TIMES ADAY, Disp: 300 each, Rfl: 3    cholecalciferol, vitamin D3, 25 mcg (1,000 unit) capsule, One tablet daily, Disp: , Rfl:     clotrimazole-betamethasone (LOTRISONE) 1-0.05 % cream, Apply topically as needed for itching., Disp: 30 g, Rfl: 2    folic acid (FOLVITE) 1 mg tablet, Take 1 mg by mouth daily., Disp: , Rfl:     lancets (ONETOUCH DELICA LANCETS) 33 gauge misc, Check glucose 3 " times aday, Disp: 200 each, Rfl: 3    lisinopriL (PRINIVIL) 10 mg tablet, TAKE 1 TABLET BY MOUTH EVERY DAY, Disp: 90 tablet, Rfl: 1    metFORMIN (GLUCOPHAGE) 500 mg tablet, Take 2 tablets (1,000 mg total) by mouth 2 (two) times a day with meals., Disp: 360 tablet, Rfl: 3    omega-3 fatty acids-fish oil 300-1,000 mg capsule, take 1 capsule daily, Disp: , Rfl:     omeprazole (PriLOSEC) 20 mg capsule, Take 20 mg by mouth daily before breakfast., Disp: , Rfl:     OZEMPIC 1 mg/dose (4 mg/3 mL) subcutaneous injection, INJECT 1 MG SUBCUTANEOUSLY EVERY 7 DAYS, Disp: 3 mL, Rfl: 1    resvera/chrom/gr.tea/EGCG/dig3 (RESVERATROL DIET ORAL), Take by mouth., Disp: , Rfl:     sertraline (ZOLOFT) 100 mg tablet, Take 100 mg by mouth every evening., Disp: , Rfl: 2    verapamil SR (CALAN-SR) 120 mg CR tablet, TAKE 1 TABLET BY MOUTH NIGHTLY, Disp: 30 tablet, Rfl: 1    zinc sulfate (ZINCATE) 220 (50) mg capsule, Take 220 mg by mouth daily., Disp: , Rfl:

## 2024-09-11 ENCOUNTER — TELEPHONE (OUTPATIENT)
Dept: CARDIOLOGY | Facility: CLINIC | Age: 69
End: 2024-09-11
Payer: MEDICARE

## 2024-09-11 NOTE — TELEPHONE ENCOUNTER
"Patient called and left a VM on our clinical line stating for the past 24 hours she has felt \"fluttering/vibration\" under her right breast. No pain or other symptoms reported. She was last seen 6/6/2024. Please advise.  "

## 2024-09-13 NOTE — PROGRESS NOTES
"     Cardiology  Office Progress Note         Reason for visit:   Chief Complaint   Patient presents with    Follow-up       HPI     Citlali Gifford is a 68 y.o. female who presents to the office for cardiovascular follow up and management of hypertension, dyslipidemia, aortic stenosis and mitral regurgitation. Other PMH includes type 2 DM.     The patient was last seen 6/6/24. The patients BP was noted to be a on the low side. Lisinopril decreased. She was to update echo at follow up.    The patient called on 9/11/24 with complaints of \"fluttering/vibration\" under right breast. She was to come in for evaluation and possible cardiac monitor.    Today:  She is under a lot of stress at home. Otherwise feels well. A week ago she was struggling with sciatica pain. With episodes of sciatica she noted a right sided \"vibration\" on her right breast and down the side.There was no cardiac symptoms. It would be constant intermittently during the day. It was relived with doing her stretching exercises for her sciatica. She has not has not had recurrent episodes. She stays active with babysitting her grand kids and short walks with her dog without and CV limitations. She continues to have mild SOB with hills/stairs but this is chronic and stable. She is checking her blood pressure at home and remains well controlled. Otherwise offers no cardiac complaints.       Past Medical History:   Diagnosis Date    Anxiety     Aortic stenosis     Claustrophobia     Elevated liver enzymes     dx-  fatty liver    Hypertension     Impaired hearing     bilateral - no hearing aids    Lipid disorder     Numbness     left foot- secondary to previous  back injury     Postmenopausal     Sleep apnea     compliant with CPAP- nightly    Tendonitis     left shoulder - treatment with PT    Type 2 diabetes mellitus (CMS/HCC)     Vertigo     ongoing- intermittent with position changes- treatment with meclizine and nasal spray       Past Surgical " "History   Procedure Laterality Date     section      Cholecystectomy      COLONOSCOPY (open access) N/A 8/15/2023    [place] [facility]    Dilation and evacuation      Endometrial ablation      FLEXIBLE COLONOSCOPY PROXIMAL TO SPLENIC FLEXURE WITH BIOPSY N/A 2018    Performed by [place]io [place] [facility]    Gallbladder surgery      Hernia repair      Hysterectomy      Knee arthroscopy Right     repair torn meniscus    Tonsillectomy         Social History     Tobacco Use    Smoking status: Never    Smokeless tobacco: Never   Vaping Use    Vaping status: Never Used   Substance Use Topics    Alcohol use: Not Currently     Comment: ocasional    Drug use: Never       Family History   Problem Relation Name Age of Onset    Hypertension Biological Mother      Diabetes Biological Mother      Heart attack Biological Father      No Known Problems Biological Sister      Hypertension Biological Brother      Diabetes Maternal Grandmother      Colon cancer Paternal Grandfather      No Known Problems Biological Daughter      Hyperlipidemia Biological Daughter      No Known Problems Biological Son         Allergies:  Amoxicillin-pot clavulanate, Sulfa (sulfonamide antibiotics), and Wellbutrin [bupropion hcl]    Current Outpatient Medications   Medication Sig Dispense Refill    acarbose (PRECOSE) 100 mg tablet TAKE 1 TAB ORAL 3 X DAILY AT START OF EACH MEAL 3 TABS DAILY 270 tablet 3    ammonium lactate (LAC-HYDRIN) 12 % lotion APPLY DAILY TO FEET      atorvastatin (LIPITOR) 20 mg tablet TAKE 1 TABLET BY MOUTH EVERY DAY AT NIGHT 30 tablet 6    b complex vitamins tablet daily.      BD ENRIQUETA 2ND GEN PEN NEEDLE 32 gauge x \" needle USE FOR DAILY INJECTION 100 each 3    blood sugar diagnostic (CONTOUR NEXT TEST STRIPS) strip Test glucose once daily 100 strip 3    blood sugar diagnostic (ONETOUCH VERIO TEST STRIPS) strip TEST 3 TIMES A  strip 7    blood-glucose meter (ONETOUCH VERIO FLEX " METER) misc CHECK GLUCOSE 3 TIMES ADAY 300 each 3    cholecalciferol, vitamin D3, 25 mcg (1,000 unit) capsule One tablet daily      clotrimazole-betamethasone (LOTRISONE) 1-0.05 % cream Apply topically as needed for itching. 30 g 2    folic acid (FOLVITE) 1 mg tablet Take 1 mg by mouth daily.      lancets (ONETOUCH DELICA LANCETS) 33 gauge misc Check glucose 3 times aday 200 each 3    lisinopriL (PRINIVIL) 10 mg tablet TAKE 1 TABLET BY MOUTH EVERY DAY 90 tablet 1    metFORMIN (GLUCOPHAGE) 500 mg tablet Take 2 tablets (1,000 mg total) by mouth 2 (two) times a day with meals. 360 tablet 3    omega-3 fatty acids-fish oil 300-1,000 mg capsule take 1 capsule daily      omeprazole (PriLOSEC) 20 mg capsule Take 20 mg by mouth daily before breakfast.      OZEMPIC 1 mg/dose (4 mg/3 mL) subcutaneous injection INJECT 1 MG SUBCUTANEOUSLY EVERY 7 DAYS 3 mL 1    resvera/chrom/gr.tea/EGCG/dig3 (RESVERATROL DIET ORAL) Take by mouth.      sertraline (ZOLOFT) 100 mg tablet Take 100 mg by mouth every evening.  2    verapamil SR (CALAN-SR) 120 mg CR tablet Take 1 tablet (120 mg total) by mouth nightly. 90 tablet 3    zinc sulfate (ZINCATE) 220 (50) mg capsule Take 220 mg by mouth daily.       No current facility-administered medications for this visit.       Review of Systems   Cardiovascular:  Positive for dyspnea on exertion. Negative for chest pain, leg swelling, near-syncope, orthopnea, paroxysmal nocturnal dyspnea and syncope.   Respiratory:  Negative for shortness of breath.    Neurological:  Negative for dizziness and light-headedness.       Objective     Vitals:    09/16/24 0739   BP: 119/75   SpO2: 99%       Wt Readings from Last 5 Encounters:   09/16/24 87.8 kg (193 lb 9.6 oz)   06/27/24 86.6 kg (191 lb)   06/06/24 87.5 kg (193 lb)   05/09/24 87.5 kg (193 lb)   04/02/24 87.5 kg (193 lb)       Body mass index is 36.58 kg/m².    Physical Exam  HENT:      Head: Normocephalic.   Eyes:      Extraocular Movements: Extraocular  movements intact.   Neck:      Vascular: No JVD.   Cardiovascular:      Rate and Rhythm: Normal rate and regular rhythm.      Heart sounds: Murmur (RADHA LSB) heard.   Pulmonary:      Breath sounds: Normal breath sounds. No wheezing, rhonchi or rales.   Abdominal:      Palpations: Abdomen is soft.   Musculoskeletal:         General: No swelling.   Skin:     General: Skin is warm.   Neurological:      Mental Status: She is alert.          ECG   Normal sinus rhythm  T wave abnormality, consider lateral ischemia       Hematology  Lab Results   Component Value Date    WBC 6.7 12/15/2020    HGB 14.4 12/15/2020    HCT 43.9 12/15/2020     12/15/2020    INR 1.0 12/05/2016       Chemistries  Lab Results   Component Value Date     09/23/2023    K 4.5 09/23/2023     09/23/2023    CREATININE 0.80 09/23/2023    BUN 25 09/23/2023    CO2 25 09/23/2023    GLUCOSE 126 (H) 09/23/2023    CALCIUM 9.8 09/23/2023    MG 2.2 12/05/2016    ALT 97 (H) 09/09/2021    AST 60 (H) 09/09/2021       Cholesterol  Lab Results   Component Value Date    CHOL 135 03/13/2023    TRIG 135 03/13/2023    HDL 37 (L) 03/13/2023    LDLCALC 76 03/13/2023       Endocrine  Lab Results   Component Value Date    TSH 3.03 12/15/2020    FREET4 0.86 10/26/2015    HGBA1C 6.1 (H) 09/23/2023       Cardiac Imaging    TRANSTHORACIC ECHO (TTE) COMPLETE 02/07/2024    Interpretation Summary    Left Ventricle: Normal ventricle size. Mild concentric left ventricular hypertrophy. Moderate mid-cavity obstruction present. Resting gradient 32mmHg that increases to 66mmHg with valsalva. Hyperdynamic systolic function. Estimated EF 75%. Wall motion appears grossly normal. Normal septal motion. Grade I diastolic dysfunction. Diastolic inflow pattern consistent with impaired relaxation.    Left Atrium: Moderately dilated atrium.    Mitral Valve: Normal leaflet structure. Normal leaflet motion. Trace regurgitation.    Aortic Valve: Tricuspid valve.  Sclerotic leaflets.  Trace regurgitation. Moderate to severe stenosis. The peak aortic velocity was measured in the right sternal border view. Peak velocity = 3.84 m/s. Mean gradient = 32.00 mmHg. Calculated area by cont eq = 1.04 cm2. Calculated dimensionless index = 0.34.    Aorta: Aortic root sclerotic. Ascending aorta sclerotic.    Right Atrium: Normal sized atrium.    Right Ventricle: Normal ventricle size. Normal systolic function.    Tricuspid Valve: Structure is grossly normal. Trace regurgitation. Estimated RVSP = 23 mmHg.                Assessment/Plan     Essential hypertension  - Target BP < 130/80    - Controlled.  - Continue  Lisinopril 10 mg (reduced 6/2024 visit from 20 mg)  - Continue Verapamil 120mg ER 120mg nightly     Valvular heart disease  - Echo 1/2023: Mild [facility]. Ef 70% Intracavitary gradient. GI DD. Moderate AS, peak 3.18m/s, mean 22mmHg, KASSY 1.52cm2. DI 0.40. Mild MR.   - Echo 2/7/24: Mild [facility]. Mod mid-cavitary obstruction, resting grad 32mmHg, increases to 66mmHg with valsalva. Hyperdynamic systolic function. EF 75% Mod-Severe AS, peak 3.84m/s, mean 32mmHg, KASSY 1.04cm2.     - Continue same medications  - Echo pending with next visit           Mixed hyperlipidemia  - target LDL <  70  - FLP 3/14/23:  , , HDL 37, LDL 76  - FLP 2/24/24: , , HDL 47, LDL 80 - Simvastatin 20mg  .  - Not quite at goal on most recent check  - Continue Atorvastatin 20mg q PM  - Repeat FLP, given Rx.  --- labs given at last OV    Palpitations  - Seen today for right breast vibration down to side, relieved with stretching exercises. No associated cardiac symptoms. No further episodes. These symptoms do not appear to be cardiac in nature.         Follow Up Plans:  Return in about 3 months (around 12/16/2024).                TERESA Henderson  9/16/2024

## 2024-09-16 ENCOUNTER — OFFICE VISIT (OUTPATIENT)
Dept: CARDIOLOGY | Facility: CLINIC | Age: 69
End: 2024-09-16
Payer: MEDICARE

## 2024-09-16 VITALS
BODY MASS INDEX: 36.55 KG/M2 | WEIGHT: 193.6 LBS | OXYGEN SATURATION: 99 % | HEIGHT: 61 IN | SYSTOLIC BLOOD PRESSURE: 119 MMHG | DIASTOLIC BLOOD PRESSURE: 75 MMHG

## 2024-09-16 DIAGNOSIS — E78.2 MIXED HYPERLIPIDEMIA: Primary | ICD-10-CM

## 2024-09-16 DIAGNOSIS — R00.2 PALPITATIONS: ICD-10-CM

## 2024-09-16 DIAGNOSIS — I38 VALVULAR HEART DISEASE: ICD-10-CM

## 2024-09-16 DIAGNOSIS — I10 ESSENTIAL HYPERTENSION: ICD-10-CM

## 2024-09-16 LAB
ATRIAL RATE: 70
P AXIS: 55
PR INTERVAL: 188
QRS DURATION: 82
QT INTERVAL: 414
QTC CALCULATION(BAZETT): 447
R AXIS: 11
T WAVE AXIS: 78
VENTRICULAR RATE: 70

## 2024-09-16 PROCEDURE — G8752 SYS BP LESS 140: HCPCS

## 2024-09-16 PROCEDURE — 99214 OFFICE O/P EST MOD 30 MIN: CPT

## 2024-09-16 PROCEDURE — 93000 ELECTROCARDIOGRAM COMPLETE: CPT

## 2024-09-16 PROCEDURE — G8754 DIAS BP LESS 90: HCPCS

## 2024-09-16 ASSESSMENT — ENCOUNTER SYMPTOMS
DYSPNEA ON EXERTION: 1
SHORTNESS OF BREATH: 0
LIGHT-HEADEDNESS: 0
NEAR-SYNCOPE: 0
PND: 0
SYNCOPE: 0
ORTHOPNEA: 0
DIZZINESS: 0

## 2024-09-16 NOTE — ASSESSMENT & PLAN NOTE
- target LDL <  70  - FLP 3/14/23:  , , HDL 37, LDL 76  - FLP 2/24/24: , , HDL 47, LDL 80 - Simvastatin 20mg  .  - Not quite at goal on most recent check  - Continue Atorvastatin 20mg q PM  - Repeat FLP, given Rx.  --- labs given at last OV

## 2024-09-16 NOTE — ASSESSMENT & PLAN NOTE
- EKG today sinus rhythm.  - Symptoms are not suggestive of a dangerous arrhythmia . We will complete a 1 week zio for further assessment.

## 2024-09-16 NOTE — ASSESSMENT & PLAN NOTE
- Target BP < 130/80    - Controlled.  - Continue  Lisinopril 10 mg (reduced 6/2024 visit from 20 mg)  - Continue Verapamil 120mg ER 120mg nightly

## 2024-09-16 NOTE — LETTER
"September 16, 2024     Billy Antunez DO  [address] [facility] [address]    Patient: Citlali Gifford  YOB: 1955  Date of Visit: 9/16/2024      Dear Dr. Antunez:    Thank you for referring Citlali Gifford to me for evaluation. Below are my notes for this consultation.    If you have questions, please do not hesitate to call me. I look forward to following your patient along with you.         Sincerely,        TERESA Henderson        CC: DO Liban Ace Cindy, CRNP  9/16/2024  8:05 AM  Sign when Signing Visit       Cardiology  Office Progress Note         Reason for visit:   Chief Complaint   Patient presents with   • Follow-up       HPI    Citlali Gifford is a 68 y.o. female who presents to the office for cardiovascular follow up and management of hypertension, dyslipidemia, aortic stenosis and mitral regurgitation. Other PMH includes type 2 DM.     The patient was last seen 6/6/24. The patients BP was noted to be a on the low side. Lisinopril decreased. She was to update echo at follow up.    The patient called on 9/11/24 with complaints of \"fluttering/vibration\" under right breast. She was to come in for evaluation and possible cardiac monitor.    Today:  She is under a lot of stress at home. Otherwise feels well. A week ago she was struggling with sciatica pain. With episodes of sciatica she noted a right sided \"vibration\" on her right breast and down the side.There was no cardiac symptoms. It would be constant intermittently during the day. It was relived with doing her stretching exercises for her sciatica. She has not has not had recurrent episodes. She stays active with babysitting her grand kids and short walks with her dog without and CV limitations. She continues to have mild SOB with hills/stairs but this is chronic and stable. She is checking her blood pressure at home and remains well controlled. Otherwise offers no cardiac complaints.       Past " Medical History:   Diagnosis Date   • Anxiety    • Aortic stenosis    • Claustrophobia    • Elevated liver enzymes     dx-  fatty liver   • Hypertension    • Impaired hearing     bilateral - no hearing aids   • Lipid disorder    • Numbness     left foot- secondary to previous  back injury    • Postmenopausal    • Sleep apnea     compliant with CPAP- nightly   • Tendonitis     left shoulder - treatment with PT   • Type 2 diabetes mellitus (CMS/HCC)    • Vertigo     ongoing- intermittent with position changes- treatment with meclizine and nasal spray       Past Surgical History   Procedure Laterality Date   •  section     • Cholecystectomy     • COLONOSCOPY (open access) N/A 8/15/2023    Performed by Liam Toribio MD at [facility]   • Dilation and evacuation     • Endometrial ablation     • FLEXIBLE COLONOSCOPY PROXIMAL TO SPLENIC FLEXURE WITH BIOPSY N/A 2018    Performed by Liam Toribio MD at [facility]   • Gallbladder surgery     • Hernia repair     • Hysterectomy     • Knee arthroscopy Right     repair torn meniscus   • Tonsillectomy         Social History     Tobacco Use   • Smoking status: Never   • Smokeless tobacco: Never   Vaping Use   • Vaping status: Never Used   Substance Use Topics   • Alcohol use: Not Currently     Comment: ocasional   • Drug use: Never       Family History   Problem Relation Name Age of Onset   • Hypertension Biological Mother     • Diabetes Biological Mother     • Heart attack Biological Father     • No Known Problems Biological Sister     • Hypertension Biological Brother     • Diabetes Maternal Grandmother     • Colon cancer Paternal Grandfather     • No Known Problems Biological Daughter     • Hyperlipidemia Biological Daughter     • No Known Problems Biological Son         Allergies:  Amoxicillin-pot clavulanate, Sulfa (sulfonamide antibiotics), and Wellbutrin [bupropion hcl]    Current Outpatient Medications   Medication Sig Dispense Refill   • acarbose (PRECOSE) 100 mg  "tablet TAKE 1 TAB ORAL 3 X DAILY AT START OF EACH MEAL 3 TABS DAILY 270 tablet 3   • ammonium lactate (LAC-HYDRIN) 12 % lotion APPLY DAILY TO FEET     • atorvastatin (LIPITOR) 20 mg tablet TAKE 1 TABLET BY MOUTH EVERY DAY AT NIGHT 30 tablet 6   • b complex vitamins tablet daily.     • BD ENRIQUETA 2ND GEN PEN NEEDLE 32 gauge x 5/32\" needle USE FOR DAILY INJECTION 100 each 3   • blood sugar diagnostic (CONTOUR NEXT TEST STRIPS) strip Test glucose once daily 100 strip 3   • blood sugar diagnostic (ONETOUCH VERIO TEST STRIPS) strip TEST 3 TIMES A  strip 7   • blood-glucose meter (ONETOUCH VERIO FLEX METER) misc CHECK GLUCOSE 3 TIMES ADAY 300 each 3   • cholecalciferol, vitamin D3, 25 mcg (1,000 unit) capsule One tablet daily     • clotrimazole-betamethasone (LOTRISONE) 1-0.05 % cream Apply topically as needed for itching. 30 g 2   • folic acid (FOLVITE) 1 mg tablet Take 1 mg by mouth daily.     • lancets (ONETOUCH DELICA LANCETS) 33 gauge misc Check glucose 3 times aday 200 each 3   • lisinopriL (PRINIVIL) 10 mg tablet TAKE 1 TABLET BY MOUTH EVERY DAY 90 tablet 1   • metFORMIN (GLUCOPHAGE) 500 mg tablet Take 2 tablets (1,000 mg total) by mouth 2 (two) times a day with meals. 360 tablet 3   • omega-3 fatty acids-fish oil 300-1,000 mg capsule take 1 capsule daily     • omeprazole (PriLOSEC) 20 mg capsule Take 20 mg by mouth daily before breakfast.     • OZEMPIC 1 mg/dose (4 mg/3 mL) subcutaneous injection INJECT 1 MG SUBCUTANEOUSLY EVERY 7 DAYS 3 mL 1   • resvera/chrom/gr.tea/EGCG/dig3 (RESVERATROL DIET ORAL) Take by mouth.     • sertraline (ZOLOFT) 100 mg tablet Take 100 mg by mouth every evening.  2   • verapamil SR (CALAN-SR) 120 mg CR tablet Take 1 tablet (120 mg total) by mouth nightly. 90 tablet 3   • zinc sulfate (ZINCATE) 220 (50) mg capsule Take 220 mg by mouth daily.       No current facility-administered medications for this visit.       Review of Systems   Cardiovascular:  Positive for dyspnea on exertion. " Negative for chest pain, leg swelling, near-syncope, orthopnea, paroxysmal nocturnal dyspnea and syncope.   Respiratory:  Negative for shortness of breath.    Neurological:  Negative for dizziness and light-headedness.       Objective    Vitals:    09/16/24 0739   BP: 119/75   SpO2: 99%       Wt Readings from Last 5 Encounters:   09/16/24 87.8 kg (193 lb 9.6 oz)   06/27/24 86.6 kg (191 lb)   06/06/24 87.5 kg (193 lb)   05/09/24 87.5 kg (193 lb)   04/02/24 87.5 kg (193 lb)       Body mass index is 36.58 kg/m².    Physical Exam  HENT:      Head: Normocephalic.   Eyes:      Extraocular Movements: Extraocular movements intact.   Neck:      Vascular: No JVD.   Cardiovascular:      Rate and Rhythm: Normal rate and regular rhythm.      Heart sounds: Murmur (RADHA LSB) heard.   Pulmonary:      Breath sounds: Normal breath sounds. No wheezing, rhonchi or rales.   Abdominal:      Palpations: Abdomen is soft.   Musculoskeletal:         General: No swelling.   Skin:     General: Skin is warm.   Neurological:      Mental Status: She is alert.          ECG   Normal sinus rhythm  T wave abnormality, consider lateral ischemia       Hematology  Lab Results   Component Value Date    WBC 6.7 12/15/2020    HGB 14.4 12/15/2020    HCT 43.9 12/15/2020     12/15/2020    INR 1.0 12/05/2016       Chemistries  Lab Results   Component Value Date     09/23/2023    K 4.5 09/23/2023     09/23/2023    CREATININE 0.80 09/23/2023    BUN 25 09/23/2023    CO2 25 09/23/2023    GLUCOSE 126 (H) 09/23/2023    CALCIUM 9.8 09/23/2023    MG 2.2 12/05/2016    ALT 97 (H) 09/09/2021    AST 60 (H) 09/09/2021       Cholesterol  Lab Results   Component Value Date    CHOL 135 03/13/2023    TRIG 135 03/13/2023    HDL 37 (L) 03/13/2023    LDLCALC 76 03/13/2023       Endocrine  Lab Results   Component Value Date    TSH 3.03 12/15/2020    FREET4 0.86 10/26/2015    HGBA1C 6.1 (H) 09/23/2023       Cardiac Imaging    TRANSTHORACIC ECHO (TTE) COMPLETE  02/07/2024    Interpretation Summary  •  Left Ventricle: Normal ventricle size. Mild concentric left ventricular hypertrophy. Moderate mid-cavity obstruction present. Resting gradient 32mmHg that increases to 66mmHg with valsalva. Hyperdynamic systolic function. Estimated EF 75%. Wall motion appears grossly normal. Normal septal motion. Grade I diastolic dysfunction. Diastolic inflow pattern consistent with impaired relaxation.  •  Left Atrium: Moderately dilated atrium.  •  Mitral Valve: Normal leaflet structure. Normal leaflet motion. Trace regurgitation.  •  Aortic Valve: Tricuspid valve.  Sclerotic leaflets. Trace regurgitation. Moderate to severe stenosis. The peak aortic velocity was measured in the right sternal border view. Peak velocity = 3.84 m/s. Mean gradient = 32.00 mmHg. Calculated area by cont eq = 1.04 cm2. Calculated dimensionless index = 0.34.  •  Aorta: Aortic root sclerotic. Ascending aorta sclerotic.  •  Right Atrium: Normal sized atrium.  •  Right Ventricle: Normal ventricle size. Normal systolic function.  •  Tricuspid Valve: Structure is grossly normal. Trace regurgitation. Estimated RVSP = 23 mmHg.                Assessment/Plan    Essential hypertension  - Target BP < 130/80    - Controlled.  - Continue  Lisinopril 10 mg (reduced 6/2024 visit from 20 mg)  - Continue Verapamil 120mg ER 120mg nightly     Valvular heart disease  - Echo 1/2023: Mild [facility]. Ef 70% Intracavitary gradient. GI DD. Moderate AS, peak 3.18m/s, mean 22mmHg, KASSY 1.52cm2. DI 0.40. Mild MR.   - Echo 2/7/24: Mild [facility]. Mod mid-cavitary obstruction, resting grad 32mmHg, increases to 66mmHg with valsalva. Hyperdynamic systolic function. EF 75% Mod-Severe AS, peak 3.84m/s, mean 32mmHg, KASSY 1.04cm2.     - Continue same medications  - Echo pending with next visit           Mixed hyperlipidemia  - target LDL <  70  - FLP 3/14/23:  , , HDL 37, LDL 76  - FLP 2/24/24: , , HDL 47, LDL 80 - Simvastatin  20mg  .  - Not quite at goal on most recent check  - Continue Atorvastatin 20mg q PM  - Repeat FLP, given Rx.  --- labs given at last OV    Palpitations  - Seen today for right breast vibration down to side, relieved with stretching exercises. No associated cardiac symptoms. No further episodes. These symptoms do not appear to be cardiac in nature.         Follow Up Plans:  Return in about 3 months (around 12/16/2024).                TERESA Henderson  9/16/2024

## 2024-09-16 NOTE — ASSESSMENT & PLAN NOTE
- Echo 1/2023: Mild [facility]. Ef 70% Intracavitary gradient. GI DD. Moderate AS, peak 3.18m/s, mean 22mmHg, KASSY 1.52cm2. DI 0.40. Mild MR.   - Echo 2/7/24: Mild [facility]. Mod mid-cavitary obstruction, resting grad 32mmHg, increases to 66mmHg with valsalva. Hyperdynamic systolic function. EF 75% Mod-Severe AS, peak 3.84m/s, mean 32mmHg, KASSY 1.04cm2.     - Continue same medications  - Echo pending with next visit

## 2024-09-16 NOTE — ASSESSMENT & PLAN NOTE
- Seen today for right breast vibration down to side, relieved with stretching exercises. No associated cardiac symptoms. No further episodes. These symptoms do not appear to be cardiac in nature.

## 2024-11-19 DIAGNOSIS — E11.9 TYPE 2 DIABETES MELLITUS WITHOUT COMPLICATION, WITHOUT LONG-TERM CURRENT USE OF INSULIN (CMS/HCC): ICD-10-CM

## 2024-11-20 RX ORDER — ACARBOSE 100 MG/1
TABLET ORAL
Qty: 270 TABLET | Refills: 1 | Status: SHIPPED | OUTPATIENT
Start: 2024-11-20

## 2024-11-20 NOTE — TELEPHONE ENCOUNTER
Last Office Visit: 3/25/2024  Last Telemedicine Visit: Visit date not found    Next Office Visit: 3/6/2025  Next Telemedicine Visit: Visit date not found     [facility] 2/2424

## 2024-11-25 ENCOUNTER — APPOINTMENT (OUTPATIENT)
Dept: URBAN - METROPOLITAN AREA CLINIC 203 | Age: 69
Setting detail: DERMATOLOGY
End: 2024-12-03

## 2024-11-25 DIAGNOSIS — D22 MELANOCYTIC NEVI: ICD-10-CM

## 2024-11-25 DIAGNOSIS — B07.8 OTHER VIRAL WARTS: ICD-10-CM

## 2024-11-25 DIAGNOSIS — L81.4 OTHER MELANIN HYPERPIGMENTATION: ICD-10-CM

## 2024-11-25 DIAGNOSIS — D18.0 HEMANGIOMA: ICD-10-CM

## 2024-11-25 DIAGNOSIS — B07.0 PLANTAR WART: ICD-10-CM

## 2024-11-25 DIAGNOSIS — L57.8 OTHER SKIN CHANGES DUE TO CHRONIC EXPOSURE TO NONIONIZING RADIATION: ICD-10-CM

## 2024-11-25 DIAGNOSIS — B00.1 HERPESVIRAL VESICULAR DERMATITIS: ICD-10-CM

## 2024-11-25 DIAGNOSIS — Z71.89 OTHER SPECIFIED COUNSELING: ICD-10-CM

## 2024-11-25 PROBLEM — D18.01 HEMANGIOMA OF SKIN AND SUBCUTANEOUS TISSUE: Status: ACTIVE | Noted: 2024-11-25

## 2024-11-25 PROBLEM — D22.5 MELANOCYTIC NEVI OF TRUNK: Status: ACTIVE | Noted: 2024-11-25

## 2024-11-25 PROBLEM — D23.72 OTHER BENIGN NEOPLASM OF SKIN OF LEFT LOWER LIMB, INCLUDING HIP: Status: ACTIVE | Noted: 2024-11-25

## 2024-11-25 PROCEDURE — OTHER SUNSCREEN RECOMMENDATIONS: OTHER

## 2024-11-25 PROCEDURE — OTHER REASSURANCE: OTHER

## 2024-11-25 PROCEDURE — OTHER DEFER: OTHER

## 2024-11-25 PROCEDURE — OTHER COUNSELING: OTHER

## 2024-11-25 PROCEDURE — 99213 OFFICE O/P EST LOW 20 MIN: CPT

## 2024-11-25 PROCEDURE — OTHER PRESCRIPTION MEDICATION MANAGEMENT: OTHER

## 2024-11-25 ASSESSMENT — LOCATION SIMPLE DESCRIPTION DERM
LOCATION SIMPLE: CHEST
LOCATION SIMPLE: LEFT FOREHEAD
LOCATION SIMPLE: LEFT BREAST
LOCATION SIMPLE: RIGHT INDEX FINGER
LOCATION SIMPLE: ABDOMEN
LOCATION SIMPLE: RIGHT PLANTAR SURFACE
LOCATION SIMPLE: RIGHT LIP
LOCATION SIMPLE: LEFT UPPER BACK

## 2024-11-25 ASSESSMENT — LOCATION DETAILED DESCRIPTION DERM
LOCATION DETAILED: LEFT MEDIAL BREAST 10-11:00 REGION
LOCATION DETAILED: RIGHT DISTAL PALMAR INDEX FINGER
LOCATION DETAILED: RIGHT INDEX FINGERTIP
LOCATION DETAILED: RIGHT INFERIOR VERMILION LIP
LOCATION DETAILED: LEFT MEDIAL BREAST 11-12:00 REGION
LOCATION DETAILED: LEFT INFERIOR MEDIAL FOREHEAD
LOCATION DETAILED: LEFT MEDIAL SUPERIOR CHEST
LOCATION DETAILED: RIGHT DISTAL RADIAL PALMAR INDEX FINGER
LOCATION DETAILED: LEFT RIB CAGE
LOCATION DETAILED: RIGHT PLANTAR FOREFOOT OVERLYING 2ND METATARSAL
LOCATION DETAILED: LEFT SUPERIOR UPPER BACK

## 2024-11-25 ASSESSMENT — LOCATION ZONE DERM
LOCATION ZONE: TRUNK
LOCATION ZONE: LIP
LOCATION ZONE: FEET
LOCATION ZONE: FINGER
LOCATION ZONE: FACE

## 2024-11-25 ASSESSMENT — TOTAL NUMBER OF LESIONS: # OF LESIONS?: 3

## 2024-11-25 NOTE — PROCEDURE: PRESCRIPTION MEDICATION MANAGEMENT
Initiate Treatment: Compound rx 7489 cimetidine 5%/5fu/sal acid 5% adhesive \\nApply qhs to warts on finger
Detail Level: Zone
Render In Strict Bullet Format?: No

## 2024-12-10 ENCOUNTER — OFFICE VISIT (OUTPATIENT)
Dept: OBSTETRICS AND GYNECOLOGY | Facility: CLINIC | Age: 69
End: 2024-12-10
Payer: MEDICARE

## 2024-12-10 VITALS
HEIGHT: 61 IN | BODY MASS INDEX: 36.25 KG/M2 | DIASTOLIC BLOOD PRESSURE: 80 MMHG | SYSTOLIC BLOOD PRESSURE: 120 MMHG | WEIGHT: 192 LBS

## 2024-12-10 DIAGNOSIS — E78.9 LIPID DISORDER: Primary | ICD-10-CM

## 2024-12-10 DIAGNOSIS — Z01.419 WELL WOMAN EXAM WITH ROUTINE GYNECOLOGICAL EXAM: ICD-10-CM

## 2024-12-10 PROCEDURE — G0101 CA SCREEN;PELVIC/BREAST EXAM: HCPCS | Performed by: OBSTETRICS & GYNECOLOGY

## 2024-12-10 NOTE — PROGRESS NOTES
Ms. 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 Tatyana model 8.7%    Colonoscopy 2024 with Dr. Toribio she gets every 5 years    Labs August 2023 A1c 6.1    No Pap test necessary    DEXA scan November 2023 completely normal study can be done again in 5 years    I have offered and ordered a calcium index for Arleth    Her neck was supple no masses    Breast exam showed no masses    Abdomen soft nontender    Uterus and adnexa surgically missing    Normal GYN checkup plan as above

## 2024-12-20 LAB
ALBUMIN SERPL-MCNC: 4.2 G/DL (ref 3.6–5.1)
ALBUMIN/CREAT UR: 8 MG/G CREAT
ALBUMIN/GLOB SERPL: 1.6 (CALC) (ref 1–2.5)
ALP SERPL-CCNC: 68 U/L (ref 37–153)
ALT SERPL-CCNC: 32 U/L (ref 6–29)
AST SERPL-CCNC: 18 U/L (ref 10–35)
BILIRUB SERPL-MCNC: 0.6 MG/DL (ref 0.2–1.2)
BUN SERPL-MCNC: 23 MG/DL (ref 7–25)
BUN/CREAT SERPL: ABNORMAL (CALC) (ref 6–22)
CALCIUM SERPL-MCNC: 9.4 MG/DL (ref 8.6–10.4)
CHLORIDE SERPL-SCNC: 104 MMOL/L (ref 98–110)
CHOLEST SERPL-MCNC: 104 MG/DL
CHOLEST/HDLC SERPL: 2.3 (CALC)
CO2 SERPL-SCNC: 26 MMOL/L (ref 20–32)
CREAT SERPL-MCNC: 0.8 MG/DL (ref 0.5–1.05)
CREAT UR-MCNC: 120 MG/DL (ref 20–275)
EGFRCR SERPLBLD CKD-EPI 2021: 81 ML/MIN/1.73M2
GLOBULIN SER CALC-MCNC: 2.7 G/DL (CALC) (ref 1.9–3.7)
GLUCOSE SERPL-MCNC: 107 MG/DL (ref 65–99)
HBA1C MFR BLD: 6.4 % OF TOTAL HGB
HDLC SERPL-MCNC: 45 MG/DL
LDLC SERPL CALC-MCNC: 44 MG/DL (CALC)
MICROALBUMIN UR-MCNC: 1 MG/DL
NONHDLC SERPL-MCNC: 59 MG/DL (CALC)
POTASSIUM SERPL-SCNC: 4.5 MMOL/L (ref 3.5–5.3)
PROT SERPL-MCNC: 6.9 G/DL (ref 6.1–8.1)
SODIUM SERPL-SCNC: 138 MMOL/L (ref 135–146)
TRIGL SERPL-MCNC: 73 MG/DL

## 2024-12-24 ENCOUNTER — HOSPITAL ENCOUNTER (OUTPATIENT)
Dept: CARDIOLOGY | Facility: CLINIC | Age: 69
Discharge: HOME | End: 2024-12-24
Attending: INTERNAL MEDICINE
Payer: MEDICARE

## 2024-12-24 VITALS
SYSTOLIC BLOOD PRESSURE: 129 MMHG | HEIGHT: 61 IN | BODY MASS INDEX: 36.25 KG/M2 | WEIGHT: 192 LBS | DIASTOLIC BLOOD PRESSURE: 79 MMHG

## 2024-12-24 DIAGNOSIS — I38 VALVULAR HEART DISEASE: Chronic | ICD-10-CM

## 2024-12-24 LAB
AORTIC ROOT ANNULUS: 2.9 CM
AORTIC VALVE MEAN VELOCITY: 2.63 M/S
AORTIC VALVE VELOCITY TIME INTEGRAL: 89.3 CM
ASCENDING AORTA: 3.8 CM
AV MEAN GRADIENT: 31 MMHG
AV PEAK GRADIENT: 61 MMHG
AV PEAK VELOCITY-S: 3.9 M/S
AV VALVE AREA INDEX: 0.71
AV VALVE AREA: 2.62 CM2
AV VELOCITY RATIO: 0.44
AVA (VTI): 1.37 CM2
BSA FOR ECHO PROCEDURE: 1.94 M2
DOP CALC LVOT STROKE VOLUME: 122.46 CM3
E WAVE DECELERATION TIME: 312 MS
E/A RATIO: 1
E/E' RATIO: 16.6
E/LAT E' RATIO: 14
EDV (BP): 96.1 CM3
EF (A4C): 72.8 %
EF A2C: 74.4 %
EJECTION FRACTION: 74.7 %
EST RIGHT VENT SYSTOLIC PRESSURE BY TRICUSPID REGURGITATION JET: 27 MMHG
ESV (BP): 24.3 CM3
FRACTIONAL SHORTENING: 34.93 %
INTERVENTRICULAR SEPTUM: 1.43 CM
LA ESV (BP): 76.9 CM3
LA ESV INDEX (A2C): 44.95 CM3/M2
LA ESV INDEX (BP): 39.64 CM3/M2
LA/AORTA RATIO: 1.38
LAAS-AP2: 24.5 CM2
LAAS-AP4: 21.1 CM2
LAD 2D: 4 CM
LALD A4C: 5.35 CM
LALD A4C: 5.74 CM
LAV-S: 87.2 CM3
LEFT ATRIUM VOLUME INDEX: 32.84 CM3/M2
LEFT ATRIUM VOLUME: 63.7 CM3
LEFT INTERNAL DIMENSION IN SYSTOLE: 2.85 CM (ref 2.6–3.93)
LEFT VENTRICLE DIASTOLIC VOLUME INDEX: 41.44 CM3/M2
LEFT VENTRICLE DIASTOLIC VOLUME: 80.4 CM3
LEFT VENTRICLE SYSTOLIC VOLUME INDEX: 11.29 CM3/M2
LEFT VENTRICLE SYSTOLIC VOLUME: 21.9 CM3
LEFT VENTRICULAR INTERNAL DIMENSION IN DIASTOLE: 4.38 CM (ref 4.39–6.1)
LEFT VENTRICULAR POSTERIOR WALL IN END DIASTOLE: 1.27 CM (ref 0.58–1.07)
LV DIASTOLIC VOLUME: 105 CM3
LV ESV (APICAL 2 CHAMBER): 26.9 CM3
LVAD-AP2: 30.4 CM2
LVAD-AP4: 28.3 CM2
LVAS-AP2: 13.6 CM2
LVAS-AP4: 11.8 CM2
LVEDVI(A2C): 54.12 CM3/M2
LVEDVI(BP): 49.54 CM3/M2
LVESVI(A2C): 13.87 CM3/M2
LVESVI(BP): 12.53 CM3/M2
LVLD-AP2: 7.78 CM
LVLD-AP4: 8.55 CM
LVLS-AP2: 6.36 CM
LVLS-AP4: 6.56 CM
LVOT 2D: 2 CM
LVOT A: 3.14 CM2
LVOT MG: 8 MMHG
LVOT MV: 1.17 M/S
LVOT PEAK VELOCITY: 4.15 M/S
LVOT PG: 69 MMHG
LVOT STROKE VOLUME INDEX: 63.12 ML/M2
LVOT VTI: 39 CM
MLH CV ECHO AVA INDEX VELOCITY RATIO: 1.4
MV E'TISSUE VEL-LAT: 0.06 M/S
MV E'TISSUE VEL-MED: 0.05 M/S
MV PEAK A VEL: 0.83 M/S
MV PEAK E VEL: 0.81 M/S
MV STENOSIS PRESSURE HALF TIME: 91 MS
MV VALVE AREA P 1/2 METHOD: 2.42 CM2
POSTERIOR WALL: 1.27 CM
PV PEAK GRADIENT: 7 MMHG
PV PV: 1.33 M/S
RAP: 3 MMHG
RVOT VMAX: 1.09 M/S
SEPTAL TISSUE DOPPLER FREE WALL LATE DIA VELOCITY (APICAL 4 CHAMBER VIEW): 0.16 M/S
TR MAX PG: 23.81 MMHG
TRICUSPID VALVE PEAK REGURGITATION VELOCITY: 2.44 M/S
Z-SCORE OF LEFT VENTRICULAR DIMENSION IN END DIASTOLE: -1.67
Z-SCORE OF LEFT VENTRICULAR DIMENSION IN END SYSTOLE: -0.9
Z-SCORE OF LEFT VENTRICULAR POSTERIOR WALL IN END DIASTOLE: 2.5

## 2024-12-24 PROCEDURE — 93306 TTE W/DOPPLER COMPLETE: CPT | Performed by: INTERNAL MEDICINE

## 2024-12-26 ENCOUNTER — TELEPHONE (OUTPATIENT)
Dept: CARDIOLOGY | Facility: CLINIC | Age: 69
End: 2024-12-26
Payer: MEDICARE

## 2025-02-03 NOTE — LETTER
June 26, 2019     Billy Antunez DO  [address] [facility] [address]    Patient: Citlali Gifford  YOB: 1955  Date of Visit: 6/26/2019      Dear Dr. Antunez:    Thank you for referring Citlali Gifford to me for evaluation. Below are my notes for this consultation.    If you have questions, please do not hesitate to call me. I look forward to following your patient along with you.         Sincerely,        Lupillo Cooley DO        CC: No Recipients  Lupillo Cooley DO  6/26/2019 11:43 AM  Signed      Chief Complaint: Mrs. Gifford today on for her hypertension diabetes and being overweight.  She denies any form of chest discomfort but has shortness of breath climbing stairs and exerting herself, she denies chest discomfort or shortness of breath with anxiety, cold weather, postprandially, with sex, at rest, or nocturnally.  She gets short of breath climbing stairs, she denies proximal nocturnal dyspnea, orthopnea, palpitations, syncope she has rare intermittent lower extremity edema that goes down by the next day and she has nocturia.       Medications:  Current Outpatient Prescriptions   Medication Sig Dispense Refill   • acarbose (PRECOSE) 50 mg tablet TAKE 1 TABLET BY ORAL ROUTE 3 TIMES EVERY DAY AT THE START (WITH THE FIRST BITE) OF EACH MAIN MEAL 90 tablet 11   • aspirin (ASPIR-81) 81 mg enteric coated tablet take 1 tablet by oral route  every day     • azelastine (ASTELIN) 137 mcg (0.1 %) nasal spray Administer 1 spray into each nostril daily. Use in each nostril as directed.     • b complex vitamins tablet No SIG Entered     • blood sugar diagnostic (CONTOUR NEXT TEST STRIPS) strip Check 4 times a day 150 strip 11   • cholecalciferol, vitamin D3, (VITAMIN D3) 1,000 unit capsule One tablet daily     • estradiol (IMVEXXY STARTER PACK) 10 mcg insert, dose pack Insert 10 mcg into the vagina daily. 38 each 6   • estradiol (YUVAFEM) 10 mcg tablet Insert 1 tablet (10 mcg total) into  the vagina 2 (two) times a week (Mon, Thu). 24 tablet 6   • lancets (MICROLET LANCET) misc Check blood glucose 4 times a day 150 each 11   • LORazepam (ATIVAN) 0.5 mg tablet Take 1 tablet (0.5 mg total) by mouth once for 1 dose. Before MRI 2 tablet 0   • meclizine (ANTIVERT) 25 mg tablet Take 1 tablet (25 mg total) by mouth 2 (two) times a day as needed for dizziness. (Patient not taking: Reported on 3/11/2019 ) 60 tablet 4   • omega-3 fatty acids-fish oil (omega-3) 300-1,000 mg capsule take 1 capsule daily     • quinapril (ACCUPRIL) 10 mg tablet Take 15 mg by mouth once daily. Take 1 and 1/2 tablet daily   5   • sertraline (ZOLOFT) 100 mg tablet Take 100 mg by mouth every evening.  2   • simvastatin (ZOCOR) 10 mg tablet Take 1 tablet (10 mg total) by mouth nightly. 90 tablet 3   • TRULICITY 1.5 mg/0.5 mL pen injector INJECT 0.5 ML (1.5 MG TOTAL) UNDER THE SKIN ONCE A WEEK. 4 pen 4     No current facility-administered medications for this visit.        Review of Systems:  Review of Systems   Constitution: Negative.   HENT: Negative.    Eyes: Negative.    Cardiovascular: Positive for dyspnea on exertion and leg swelling.   Respiratory: Negative.    Endocrine: Negative.    Hematologic/Lymphatic: Negative.    Skin: Negative.    Musculoskeletal: Negative.    Gastrointestinal: Negative.    Genitourinary: Positive for nocturia.   Neurological: Negative.    Psychiatric/Behavioral: Negative.    Allergic/Immunologic: Positive for environmental allergies.       Physical Exam:  Vitals:    06/26/19 1023   BP: (!) 144/76   Pulse: 88   Resp: 14   Temp: 37 °C (98.6 °F)   SpO2: 98%     Physical Exam   Constitutional: She is oriented to person, place, and time.   overweight   HENT:   Head: Normocephalic and atraumatic.   Eyes: Pupils are equal, round, and reactive to light. Conjunctivae and EOM are normal.   Neck:   Bilateral bruits   Cardiovascular:   Murmur (2/6 systolic murmur is AS) heard.  Pulmonary/Chest: Effort normal and  breath sounds normal.   Abdominal: Soft. Bowel sounds are normal.   Musculoskeletal: Normal range of motion.   Neurological: She is alert and oriented to person, place, and time. She has normal reflexes.   Skin: Skin is warm and dry.   Psychiatric: She has a normal mood and affect. Her behavior is normal. Judgment and thought content normal.   Vitals reviewed.    EKG: SR YFN Barclay    Assessment and Plan:  Impression:  Dyspnea.  Mild aortic stenosis.  Hypertension not quite controlled.  Diabetes mellitus type 2.  Overweight.  Anxiety.  Recommendation:  Her blood pressure was 144/76 today and her being diabetic I would like to see her closer to 120 than 1930 so I increased her Accupril from 15 mg a day to 20 mg daily this will also help protect her kidney and decrease any proteinuria.  I spoke to her length about diet and exercise I talked to her about weight loss and I explained to her why she needs the weight loss and what she is looking at in terms of aggressive atherosclerosis aortic stenosis etc. we will see her in 6 months time to see how she is doing we will also do a repeat echo because will be 18 months since the last echo to follow her aortic stenosis in my thank you for the opportunity seeing such nice woman today.    Lupillo Cooley, DO                Detail Level: Simple Anesthesia Type: 1% lidocaine with epinephrine and a 1:10 solution of 8.4% sodium bicarbonate Anesthesia Volume In Cc: 0.3 Incision Method: an 11 blade

## 2025-02-19 ENCOUNTER — HOSPITAL ENCOUNTER (OUTPATIENT)
Dept: RADIOLOGY | Age: 70
Discharge: HOME | End: 2025-02-19
Attending: OBSTETRICS & GYNECOLOGY
Payer: MEDICARE

## 2025-02-19 DIAGNOSIS — E78.9 LIPID DISORDER: ICD-10-CM

## 2025-02-19 PROCEDURE — 75571 CT HRT W/O DYE W/CA TEST: CPT

## 2025-02-21 ENCOUNTER — TELEPHONE (OUTPATIENT)
Dept: CARDIOLOGY | Facility: CLINIC | Age: 70
End: 2025-02-21
Payer: MEDICARE

## 2025-02-21 DIAGNOSIS — E78.2 MIXED HYPERLIPIDEMIA: Chronic | ICD-10-CM

## 2025-02-21 DIAGNOSIS — E11.9 TYPE 2 DIABETES MELLITUS WITHOUT COMPLICATION, WITHOUT LONG-TERM CURRENT USE OF INSULIN (CMS/HCC): ICD-10-CM

## 2025-02-21 NOTE — TELEPHONE ENCOUNTER
Talked to Kailyn. She had requested a CAC from her OB. We discussed her CAC. Her most recent LDL was 44 from 12/2024 so will continue current statin. Otherwise she has been asymptomatic. It will be further discussed at her upcoming visit. She had no further questions.

## 2025-02-21 NOTE — TELEPHONE ENCOUNTER
"Pt of Dr Cooley/ TERESA Henderson.    Last OV: 9/16/24 with Tessa; 6/6/24 with Dr Cooley.    Pt left  on clinical line requesting Dr Cooley review CTA calcium score done 12/10/25 and call her regarding results .    IMPRESSION:  1. Composite Agatston coronary artery calcium score of 109, which is between the  50 and 75 percentile for females between the ages of 65 and 74, as per CHAVES 2006  Database. This correlates to \"definite, at least moderate atherosclerotic  plaque\" with \"mild coronary artery disease highly likely, significant narrowings  possible\".  2. Incidental severe calcifications of the tricuspid aortic valve.  3. The visualized noncardiac portions of the exam are within normal limits for  patient age.          Pt phone:   [phone]    Thanks.  "

## 2025-02-24 RX ORDER — METFORMIN HYDROCHLORIDE 500 MG/1
TABLET ORAL
Qty: 120 TABLET | Refills: 0 | Status: SHIPPED | OUTPATIENT
Start: 2025-02-24

## 2025-02-24 RX ORDER — ATORVASTATIN CALCIUM 20 MG/1
20 TABLET, FILM COATED ORAL SEE ADMIN INSTRUCTIONS
Qty: 30 TABLET | Refills: 6 | Status: SHIPPED | OUTPATIENT
Start: 2025-02-24

## 2025-02-24 NOTE — TELEPHONE ENCOUNTER
"[facility] Refill Request      MA Notes:      Nurse Notes:      Last Office Visit: 9/16/2024  Last Telemedicine Visit: Visit date not found    Next Office Visit: 4/23/2025  Next Telemedicine Visit: Visit date not found     Most Recent BP Readings:  BP Readings from Last 3 Encounters:   12/24/24 129/79   12/10/24 120/80   09/16/24 119/75       Most recent Lab results:  Lab Results   Component Value Date    CHOL 104 12/19/2024    HDL 45 (L) 12/19/2024    TRIG 73 12/19/2024       Lab Results   Component Value Date    AST 18 12/19/2024    ALT 32 (H) 12/19/2024       Lab Results   Component Value Date     12/19/2024    K 4.5 12/19/2024    BUN 23 12/19/2024    CREATININE 0.80 12/19/2024       Current Medications:    Current Outpatient Medications:     acarbose (PRECOSE) 100 mg tablet, TAKE 1 TABLET BY MOUTH THREE TIMES A DAY AT START OF EACH MEAL, Disp: 270 tablet, Rfl: 1    ammonium lactate (LAC-HYDRIN) 12 % lotion, APPLY DAILY TO FEET, Disp: , Rfl:     atorvastatin (LIPITOR) 20 mg tablet, TAKE 1 TABLET BY MOUTH EVERY DAY AT NIGHT, Disp: 30 tablet, Rfl: 6    b complex vitamins tablet, daily., Disp: , Rfl:     BD ENRIQUETA 2ND GEN PEN NEEDLE 32 gauge x 5/32\" needle, USE FOR DAILY INJECTION, Disp: 100 each, Rfl: 3    blood sugar diagnostic (CONTOUR NEXT TEST STRIPS) strip, Test glucose once daily, Disp: 100 strip, Rfl: 3    blood sugar diagnostic (ONETOUCH VERIO TEST STRIPS) strip, TEST 3 TIMES A DAY, Disp: 100 strip, Rfl: 7    blood-glucose meter (ONETOUCH VERIO FLEX METER) [facility] GLUCOSE 3 TIMES ADAY, Disp: 300 each, Rfl: 3    cholecalciferol, vitamin D3, 25 mcg (1,000 unit) capsule, One tablet daily, Disp: , Rfl:     clotrimazole-betamethasone (LOTRISONE) 1-0.05 % cream, Apply topically as needed for itching. (Patient not taking: Reported on 12/10/2024), Disp: 30 g, Rfl: 2    folic acid (FOLVITE) 1 mg tablet, Take 1 mg by mouth daily., Disp: , Rfl:     lancets (ONETOUCH DELICA " LANCETS) 33 gauge misc, Check glucose 3 times aday, Disp: 200 each, Rfl: 3    lisinopriL (PRINIVIL) 10 mg tablet, TAKE 1 TABLET BY MOUTH EVERY DAY, Disp: 90 tablet, Rfl: 1    metFORMIN (GLUCOPHAGE) 500 mg tablet, Take 2 tablets (1,000 mg total) by mouth 2 (two) times a day with meals., Disp: 360 tablet, Rfl: 3    omega-3 fatty acids-fish oil 300-1,000 mg capsule, take 1 capsule daily, Disp: , Rfl:     omeprazole (PriLOSEC) 20 mg capsule, Take 20 mg by mouth daily before breakfast., Disp: , Rfl:     OZEMPIC 1 mg/dose (4 mg/3 mL) subcutaneous injection, INJECT 1 MG SUBCUTANEOUSLY EVERY 7 DAYS, Disp: 3 mL, Rfl: 1    resvera/chrom/gr.tea/EGCG/dig3 (RESVERATROL DIET ORAL), Take by mouth., Disp: , Rfl:     sertraline (ZOLOFT) 100 mg tablet, Take 100 mg by mouth every evening., Disp: , Rfl: 2    verapamil SR (CALAN-SR) 120 mg CR tablet, Take 1 tablet (120 mg total) by mouth nightly., Disp: 90 tablet, Rfl: 3    zinc sulfate (ZINCATE) 220 (50) mg capsule, Take 220 mg by mouth daily., Disp: , Rfl:

## 2025-02-26 RX ORDER — LISINOPRIL 10 MG/1
10 TABLET ORAL DAILY
Qty: 90 TABLET | Refills: 1 | Status: SHIPPED | OUTPATIENT
Start: 2025-02-26

## 2025-02-26 NOTE — TELEPHONE ENCOUNTER
"[facility] Refill Request      MA Notes:      Nurse Notes:      Last Office Visit: 6/6/2024  Last Telemedicine Visit: Visit date not found    Next Office Visit: 4/23/2025  Next Telemedicine Visit: Visit date not found     Most Recent BP Readings:  BP Readings from Last 3 Encounters:   12/24/24 129/79   12/10/24 120/80   09/16/24 119/75       Most recent Lab results:  Lab Results   Component Value Date    CHOL 104 12/19/2024    HDL 45 (L) 12/19/2024    TRIG 73 12/19/2024       Lab Results   Component Value Date    AST 18 12/19/2024    ALT 32 (H) 12/19/2024       Lab Results   Component Value Date     12/19/2024    K 4.5 12/19/2024    BUN 23 12/19/2024    CREATININE 0.80 12/19/2024       Current Medications:    Current Outpatient Medications:     acarbose (PRECOSE) 100 mg tablet, TAKE 1 TABLET BY MOUTH THREE TIMES A DAY AT START OF EACH MEAL, Disp: 270 tablet, Rfl: 1    ammonium lactate (LAC-HYDRIN) 12 % lotion, APPLY DAILY TO FEET, Disp: , Rfl:     atorvastatin (LIPITOR) 20 mg tablet, TAKE 1 TABLET BY MOUTH EVERY DAY AT NIGHT, Disp: 30 tablet, Rfl: 6    b complex vitamins tablet, daily., Disp: , Rfl:     BD ENRIQUETA 2ND GEN PEN NEEDLE 32 gauge x 5/32\" needle, USE FOR DAILY INJECTION, Disp: 100 each, Rfl: 3    blood sugar diagnostic (CONTOUR NEXT TEST STRIPS) strip, Test glucose once daily, Disp: 100 strip, Rfl: 3    blood sugar diagnostic (ONETOUCH VERIO TEST STRIPS) strip, TEST 3 TIMES A DAY, Disp: 100 strip, Rfl: 7    blood-glucose meter (ONETOUCH VERIO FLEX METER) [facility], CHECK GLUCOSE 3 TIMES ADAY, Disp: 300 each, Rfl: 3    cholecalciferol, vitamin D3, 25 mcg (1,000 unit) capsule, One tablet daily, Disp: , Rfl:     clotrimazole-betamethasone (LOTRISONE) 1-0.05 % cream, Apply topically as needed for itching. (Patient not taking: Reported on 12/10/2024), Disp: 30 g, Rfl: 2    folic acid (FOLVITE) 1 mg tablet, Take 1 mg by mouth daily., Disp: , Rfl:     lancets (ONETOUCH DELICA LANCETS) " 33 gauge misc, Check glucose 3 times aday, Disp: 200 each, Rfl: 3    lisinopriL (PRINIVIL) 10 mg tablet, TAKE 1 TABLET BY MOUTH EVERY DAY, Disp: 90 tablet, Rfl: 1    metFORMIN (GLUCOPHAGE) 500 mg tablet, TAKE 2 TABLETS BY MOUTH TWICE DAILY WITH FOOD, Disp: 120 tablet, Rfl: 0    omega-3 fatty acids-fish oil 300-1,000 mg capsule, take 1 capsule daily, Disp: , Rfl:     omeprazole (PriLOSEC) 20 mg capsule, Take 20 mg by mouth daily before breakfast., Disp: , Rfl:     OZEMPIC 1 mg/dose (4 mg/3 mL) subcutaneous injection, INJECT 1 MG SUBCUTANEOUSLY EVERY 7 DAYS, Disp: 3 mL, Rfl: 1    resvera/chrom/gr.tea/EGCG/dig3 (RESVERATROL DIET ORAL), Take by mouth., Disp: , Rfl:     sertraline (ZOLOFT) 100 mg tablet, Take 100 mg by mouth every evening., Disp: , Rfl: 2    verapamil SR (CALAN-SR) 120 mg CR tablet, Take 1 tablet (120 mg total) by mouth nightly., Disp: 90 tablet, Rfl: 3    zinc sulfate (ZINCATE) 220 (50) mg capsule, Take 220 mg by mouth daily., Disp: , Rfl:

## 2025-04-15 NOTE — PROGRESS NOTES
"     Cardiology  Office Progress Note         Reason for visit:   Chief Complaint   Patient presents with    Follow-up       HPI     Citlali Gifford is a 69 y.o. female who presents to the office for cardiovascular follow up and management of hypertension, dyslipidemia, aortic stenosis and mitral regurgitation. Other PMH includes type 2 DM.      She was last seen in the office by TERESA Guerrero on 9/16/24. At that visit, she reported remaining active without CV limitations. She continued to have mild FATIMA with stairs that remained chronic and stable. She offered not additional cardiac complaints.      She was ordered repeat labs and asked to follow up in 3 months.     Labs 12/19/24 - , TG 73, HDL 45, LDL 44  Hgb A1c 6.4    Echo 12/24/24 - Compared to prior study of 2/7/2024, there is redemonstrated LVH with mid cavitary obstruction gradient. The obstruction gradient is higher on the current study. There is redemonstrated moderate to severe aortic stenosis and the peak velocity and mean gradient are higher on the current study.     CT Heart Coronary Calcium Score 2/19/25    IMPRESSION:  1. Composite Agatston coronary artery calcium score of 109, which is between the  50 and 75 percentile for females between the ages of 65 and 74, as per CHAVES 2006  Database. This correlates to \"definite, at least moderate atherosclerotic  plaque\" with \"mild coronary artery disease highly likely, significant narrowings  possible\".  2. Incidental severe calcifications of the tricuspid aortic valve.  3. The visualized noncardiac portions of the exam are within normal limits for  patient age.    Labs 4/21/25 - , , HDL 43, LDL 59    Today she reports feeling ok. She admits to increased stress in her life recently. She has not been monitoring her blood pressures at home. 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 She continues to experience mild FATIMA with hills and stairs. This has " remained unchanged from previous reports. She has been tolerating her medications without any adverse reactions. She denies any chest pain, shortness of breath at rest, palpitations, lightheadedness, dizziness or syncopal events.     Past Medical History:   Diagnosis Date    Anxiety     Aortic stenosis     Claustrophobia     Elevated liver enzymes     dx-  fatty liver    Hypertension     Impaired hearing     bilateral - no hearing aids    Lipid disorder     Numbness     left foot- secondary to previous  back injury     Postmenopausal     Sleep apnea     compliant with CPAP- nightly    Tendonitis     left shoulder - treatment with PT    Type 2 diabetes mellitus (CMS/HCC)     Vertigo     ongoing- intermittent with position changes- treatment with meclizine and nasal spray       Past Surgical History   Procedure Laterality Date     section      Cholecystectomy      COLONOSCOPY (open access) N/A 8/15/2023    Performed by Liam Toribio MD at [facility]    Dilation and evacuation      Endometrial ablation      FLEXIBLE COLONOSCOPY PROXIMAL TO SPLENIC FLEXURE WITH BIOPSY N/A 2018    Performed by Liam Toribio MD at [facility]    Gallbladder surgery      Hernia repair      Hysterectomy      Knee arthroscopy Right     repair torn meniscus    Tonsillectomy         Social History     Tobacco Use    Smoking status: Never    Smokeless tobacco: Never   Vaping Use    Vaping status: Never Used   Substance Use Topics    Alcohol use: Not Currently     Comment: ocasional    Drug use: Never       Family History   Problem Relation Name Age of Onset    Hypertension Biological Mother      Diabetes Biological Mother      Heart attack Biological Father      No Known Problems Biological Sister      Hypertension Biological Brother      Diabetes Maternal Grandmother      Colon cancer Paternal Grandfather      No Known Problems Biological Daughter      Hyperlipidemia Biological Daughter      No Known Problems Biological Son    "      Allergies:  Amoxicillin-pot clavulanate, Sulfa (sulfonamide antibiotics), and Wellbutrin [bupropion hcl]    Current Outpatient Medications   Medication Sig Dispense Refill    acarbose (PRECOSE) 100 mg tablet TAKE 1 TABLET BY MOUTH THREE TIMES A DAY AT START OF EACH MEAL 270 tablet 1    ammonium lactate (LAC-HYDRIN) 12 % lotion APPLY DAILY TO FEET      atorvastatin (LIPITOR) 20 mg tablet TAKE 1 TABLET BY MOUTH EVERY DAY AT NIGHT 30 tablet 6    b complex vitamins tablet daily.      BD ENRIQUETA 2ND GEN PEN NEEDLE 32 gauge x 5/32\" needle USE FOR DAILY INJECTION 100 each 3    blood sugar diagnostic (CONTOUR NEXT TEST STRIPS) strip Test glucose once daily 100 strip 3    blood sugar diagnostic (ONETOUCH VERIO TEST STRIPS) strip TEST 3 TIMES A  strip 7    blood-glucose meter (ONETOUCH VERIO FLEX METER) misc CHECK GLUCOSE 3 TIMES ADAY 300 each 3    cholecalciferol, vitamin D3, 25 mcg (1,000 unit) capsule One tablet daily      clotrimazole-betamethasone (LOTRISONE) 1-0.05 % cream Apply topically as needed for itching. 30 g 2    folic acid (FOLVITE) 1 mg tablet Take 1 mg by mouth daily.      lancets (ONETOUCH DELICA LANCETS) 33 gauge misc Check glucose 3 times aday 200 each 3    lisinopriL (PRINIVIL) 10 mg tablet TAKE 1 TABLET BY MOUTH EVERY DAY 90 tablet 1    metFORMIN (GLUCOPHAGE) 500 mg tablet TAKE 2 TABLETS BY MOUTH TWICE DAILY WITH FOOD 120 tablet 0    omega-3 fatty acids-fish oil 300-1,000 mg capsule take 1 capsule daily      omeprazole (PriLOSEC) 20 mg capsule Take 20 mg by mouth daily before breakfast.      OZEMPIC 1 mg/dose (4 mg/3 mL) subcutaneous injection INJECT 1 MG SUBCUTANEOUSLY EVERY 7 DAYS 3 mL 1    resvera/chrom/gr.tea/EGCG/dig3 (RESVERATROL DIET ORAL) Take by mouth.      sertraline (ZOLOFT) 100 mg tablet Take 100 mg by mouth every evening.  2    verapamil SR (CALAN-SR) 120 mg CR tablet Take 1 tablet (120 mg total) by mouth nightly. 90 tablet 3    zinc sulfate (ZINCATE) 220 (50) mg capsule Take 220 " mg by mouth daily.       No current facility-administered medications for this visit.       Review of Systems   Constitutional: Negative for decreased appetite, malaise/fatigue, weight gain and weight loss.   Cardiovascular:  Positive for dyspnea on exertion.   Respiratory:  Negative for cough, shortness of breath and snoring.    Hematologic/Lymphatic: Does not bruise/bleed easily.   Musculoskeletal:  Positive for arthritis and joint pain.   Gastrointestinal:  Negative for heartburn.   Genitourinary:  Negative for hematuria and nocturia.   Neurological:  Negative for dizziness and light-headedness.   Psychiatric/Behavioral:  The patient does not have insomnia.    All other systems reviewed and are negative.      Objective     Vitals:    04/23/25 0815   BP: 132/82   Pulse: 64   SpO2: 98%       Wt Readings from Last 5 Encounters:   04/23/25 87.5 kg (193 lb)   12/24/24 87.1 kg (192 lb)   12/10/24 87.1 kg (192 lb)   09/16/24 87.8 kg (193 lb 9.6 oz)   06/27/24 86.6 kg (191 lb)       Body mass index is 36.47 kg/m².    Physical Exam  Vitals and nursing note reviewed.   Constitutional:       Appearance: Normal appearance. She is well-developed.   HENT:      Head: Normocephalic and atraumatic.   Eyes:      General: No scleral icterus.  Neck:      Vascular: Carotid bruit present. No JVD.   Cardiovascular:      Rate and Rhythm: Normal rate and regular rhythm.      Pulses: Normal pulses and intact distal pulses.           Carotid pulses are 2+ on the right side with bruit and 2+ on the left side with bruit.       Radial pulses are 2+ on the right side and 2+ on the left side.        Dorsalis pedis pulses are 2+ on the right side and 2+ on the left side.        Posterior tibial pulses are 2+ on the right side and 2+ on the left side.      Heart sounds: S1 normal. Murmur (III/VI RADHA at base that radiates towards bilateral carotids. Diminished S2.) heard.      No friction rub. No gallop. No S3 or S4 sounds.   Pulmonary:       Effort: Pulmonary effort is normal. No respiratory distress.      Breath sounds: Normal breath sounds. No stridor. No wheezing, rhonchi or rales.   Chest:      Chest wall: No tenderness.   Abdominal:      General: Bowel sounds are normal. There is no distension.      Palpations: Abdomen is soft. There is no mass.      Tenderness: There is no abdominal tenderness. There is no guarding or rebound.   Musculoskeletal:      Right lower leg: No edema.      Left lower leg: No edema.   Skin:     General: Skin is warm and dry.      Coloration: Skin is not pale.      Findings: No erythema or rash.   Neurological:      General: No focal deficit present.      Mental Status: She is alert and oriented to person, place, and time.   Psychiatric:         Mood and Affect: Mood normal.         Behavior: Behavior normal.         Thought Content: Thought content normal.         Judgment: Judgment normal.       ECG   sinus rhythm, I have independently reviewed the patient's ECG results.  Significant abnormals are :.  Normal sinus rhythm 64bpm  Nonspecific ST and T wave abnormality   Abnormal ECG   When compared with ECG of 16-SEP-2024 07:51,   No significant change was found   Confirmed by Alvaro Cooley ([id number]) on 4/23/2025 8:26:28 AM     Hematology  Lab Results   Component Value Date    WBC 6.7 12/15/2020    HGB 14.4 12/15/2020    HCT 43.9 12/15/2020     12/15/2020    INR 1.0 12/05/2016     Chemistries  Lab Results   Component Value Date     04/21/2025    K 4.5 04/21/2025     04/21/2025    CREATININE 0.78 04/21/2025    BUN 24 04/21/2025    CO2 26 04/21/2025    GLUCOSE 107 (H) 04/21/2025    CALCIUM 9.6 04/21/2025    MG 2.2 12/05/2016    ALT 36 (H) 04/21/2025    AST 22 04/21/2025     Cholesterol  Lab Results   Component Value Date    CHOL 122 04/21/2025    TRIG 112 04/21/2025    HDL 43 (L) 04/21/2025    LDLCALC 59 04/21/2025     Endocrine  Lab Results   Component Value Date    TSH 3.03 12/15/2020    FREET4 0.86  10/26/2015    HGBA1C 6.4 (H) 12/19/2024     Cardiac Imaging    TRANSTHORACIC ECHO (TTE) COMPLETE 12/24/2024    Interpretation Summary    Left Ventricle: Normal ventricle size. Concentric left ventricular hypertrophy. Hyperdynamic systolic function. Estimated EF >75%.  Mild mid cavitary obstruction gradient of 19 mmHg.  Peak obstruction gradient 69 mmHg with Valsalva, undetermined level of obstruction given continuous-wave Doppler, likely mid cavitary. Diastolic dysfunction.    Right Ventricle: Normal ventricle size. Normal systolic function.    Left Atrium: Mildly dilated atrium.    Right Atrium: Normal sized atrium.    Aortic Valve: Valve not well seen but likely trileaflet.  Diffuse calcification present. Trace regurgitation. Moderate to severe stenosis. Peak velocity = 3.90 m/s. Mean gradient = 31.00 mmHg. Calculated area by cont eq = 1.37 cm2. Calculated dimensionless index = 0.44.    Mitral Valve: Sclerotic mitral valve. Normal leaflet motion. Mitral annular calcification. Trace regurgitation.    Tricuspid Valve: Normal structure. Mild regurgitation. Estimated RVSP = 27 mmHg.    Pulmonic Valve: Normal structure. No regurgitation. No stenosis.    Aorta: Aortic root normal. Sinuses of Valsalva normal-sized. Ascending aorta normal-sized. Aortic arch normal-sized. Descending aorta not well visualized.    IVC/SVC: Inferior vena cava is <2.1cm. Inferior vena cava collapses >50% during inspiration.    Pericardium: No evidence of pericardial effusion.    Compared to prior study of 2/7/2024, there is redemonstrated LVH with mid cavitary obstruction gradient.  The obstruction gradient is higher on the current study.  There is redemonstrated moderate to severe aortic stenosis and the peak velocity and mean gradient are higher on the current study.    Assessment/Plan     Essential hypertension  - Target BP < 130/80  .  - Little up today, rushed to get here as she was 10 minutes late. Well controlled at other apts.   -  Continue Lisinopril 10mg q PM  - Continue Verapamil 120mg ER 120mg nightly     Valvular heart disease  - Echo 1/2023: Mild CLVH. Ef 70% Intracavitary gradient. GI DD. Moderate AS, peak 3.18m/s, mean 22mmHg, KASSY 1.52cm2. DI 0.40. Mild MR.   - Echo 2/7/24: Mild [facility]. Mod mid-cavitary obstruction, resting grad 32mmHg, increases to 66mmHg with valsalva. Hyperdynamic systolic function. EF 75% Mod-Severe AS, peak 3.84m/s, mean 32mmHg, KASSY 1.04cm2.   .  - Continue same medications  - Continue Verapamil 120mg q PM and monitor for improvement in intracavitary gradients and mild Sx of SOB when walking uphill with dog.   - Will order a repeat echo at time of next visit.      Type 2 diabetes mellitus without complication, without long-term current use of insulin (CMS/[facility])  - continue statin and ace.     Mixed hyperlipidemia  - target LDL <  70  - FLP 3/14/23:  , , HDL 37, LDL 76  - FLP 2/24/24: , , HDL 47, LDL 80 - Simvastatin 20mg  - FLP 12/19/24 - , TG 73, HDL 45, LDL 44   - FLP 4/21/25 - , , HDL 43, LDL 59  .  - At goal!  - Continue Atorvastatin 20mg q PM    Agatston coronary artery calcium score between 100 and 199  - CACS 2/2025: Score of 109.   .  - Continue Atorvastatin    Follow Up Plans:  Return in about 6 months (around 10/23/2025).     I attest that this visit supports the complexity inherent to evaluation and management associated with medical care services that serve as the continuing focal point for all needed health care services and/or medical care services that are part of ongoing care related to this patients single, serious, or complex condition.         I, Hemant Young, am scribing for, and in the presence of, Alvaro Cooley DO.    I, Alvaro Cooley DO, personally performed the services described in this documentation as scribed by Hemant Young in my presence, and it is both accurate and complete.       Alvaro Cooley DO  4/23/2025

## 2025-04-21 LAB
ALBUMIN SERPL-MCNC: 4.3 G/DL (ref 3.6–5.1)
ALBUMIN/GLOB SERPL: 1.7 (CALC) (ref 1–2.5)
ALP SERPL-CCNC: 65 U/L (ref 37–153)
ALT SERPL-CCNC: 36 U/L (ref 6–29)
AST SERPL-CCNC: 22 U/L (ref 10–35)
BILIRUB SERPL-MCNC: 0.5 MG/DL (ref 0.2–1.2)
BUN SERPL-MCNC: 24 MG/DL (ref 7–25)
BUN/CREAT SERPL: ABNORMAL (CALC) (ref 6–22)
CALCIUM SERPL-MCNC: 9.6 MG/DL (ref 8.6–10.4)
CHLORIDE SERPL-SCNC: 105 MMOL/L (ref 98–110)
CHOLEST SERPL-MCNC: 122 MG/DL
CHOLEST/HDLC SERPL: 2.8 (CALC)
CO2 SERPL-SCNC: 26 MMOL/L (ref 20–32)
CREAT SERPL-MCNC: 0.78 MG/DL (ref 0.5–1.05)
EGFRCR SERPLBLD CKD-EPI 2021: 83 ML/MIN/1.73M2
GLOBULIN SER CALC-MCNC: 2.6 G/DL (CALC) (ref 1.9–3.7)
GLUCOSE SERPL-MCNC: 107 MG/DL (ref 65–99)
HDLC SERPL-MCNC: 43 MG/DL
LDLC SERPL CALC-MCNC: 59 MG/DL (CALC)
NONHDLC SERPL-MCNC: 79 MG/DL (CALC)
POTASSIUM SERPL-SCNC: 4.5 MMOL/L (ref 3.5–5.3)
PROT SERPL-MCNC: 6.9 G/DL (ref 6.1–8.1)
SODIUM SERPL-SCNC: 138 MMOL/L (ref 135–146)
TRIGL SERPL-MCNC: 112 MG/DL

## 2025-04-22 ENCOUNTER — RESULTS FOLLOW-UP (OUTPATIENT)
Dept: CARDIOLOGY | Facility: CLINIC | Age: 70
End: 2025-04-22

## 2025-04-23 ENCOUNTER — OFFICE VISIT (OUTPATIENT)
Dept: CARDIOLOGY | Facility: CLINIC | Age: 70
End: 2025-04-23
Payer: MEDICARE

## 2025-04-23 VITALS
BODY MASS INDEX: 36.44 KG/M2 | HEIGHT: 61 IN | DIASTOLIC BLOOD PRESSURE: 82 MMHG | OXYGEN SATURATION: 98 % | WEIGHT: 193 LBS | HEART RATE: 64 BPM | SYSTOLIC BLOOD PRESSURE: 132 MMHG

## 2025-04-23 DIAGNOSIS — I10 ESSENTIAL HYPERTENSION: Primary | Chronic | ICD-10-CM

## 2025-04-23 DIAGNOSIS — E78.2 MIXED HYPERLIPIDEMIA: Chronic | ICD-10-CM

## 2025-04-23 DIAGNOSIS — E11.9 TYPE 2 DIABETES MELLITUS WITHOUT COMPLICATION, WITHOUT LONG-TERM CURRENT USE OF INSULIN (CMS/HCC): ICD-10-CM

## 2025-04-23 DIAGNOSIS — R93.1 AGATSTON CORONARY ARTERY CALCIUM SCORE BETWEEN 100 AND 199: ICD-10-CM

## 2025-04-23 DIAGNOSIS — I38 VALVULAR HEART DISEASE: Chronic | ICD-10-CM

## 2025-04-23 LAB
ATRIAL RATE: 64
P AXIS: 63
PR INTERVAL: 188
QRS DURATION: 80
QT INTERVAL: 420
QTC CALCULATION(BAZETT): 433
R AXIS: 30
T WAVE AXIS: 240
VENTRICULAR RATE: 64

## 2025-04-23 PROCEDURE — G8752 SYS BP LESS 140: HCPCS | Performed by: INTERNAL MEDICINE

## 2025-04-23 PROCEDURE — 99214 OFFICE O/P EST MOD 30 MIN: CPT | Performed by: INTERNAL MEDICINE

## 2025-04-23 PROCEDURE — 93000 ELECTROCARDIOGRAM COMPLETE: CPT | Performed by: INTERNAL MEDICINE

## 2025-04-23 PROCEDURE — G2211 COMPLEX E/M VISIT ADD ON: HCPCS | Performed by: INTERNAL MEDICINE

## 2025-04-23 PROCEDURE — G8754 DIAS BP LESS 90: HCPCS | Performed by: INTERNAL MEDICINE

## 2025-04-23 ASSESSMENT — ENCOUNTER SYMPTOMS
BRUISES/BLEEDS EASILY: 0
WEIGHT LOSS: 0
COUGH: 0
SNORING: 0
DYSPNEA ON EXERTION: 1
SHORTNESS OF BREATH: 0
DIZZINESS: 0
LIGHT-HEADEDNESS: 0
HEMATURIA: 0
INSOMNIA: 0
WEIGHT GAIN: 0
HEARTBURN: 0
DECREASED APPETITE: 0

## 2025-04-23 NOTE — ASSESSMENT & PLAN NOTE
- CACS 2/2025: Score of 109.   .  - Continue Atorvastatin  
- Echo 1/2023: Mild [facility]. Ef 70% Intracavitary gradient. GI DD. Moderate AS, peak 3.18m/s, mean 22mmHg, KASSY 1.52cm2. DI 0.40. Mild MR.   - Echo 2/7/24: Mild [facility]. Mod mid-cavitary obstruction, resting grad 32mmHg, increases to 66mmHg with valsalva. Hyperdynamic systolic function. EF 75% Mod-Severe AS, peak 3.84m/s, mean 32mmHg, KASSY 1.04cm2.   .  - Continue same medications  - Continue Verapamil 120mg q PM and monitor for improvement in intracavitary gradients and mild Sx of SOB when walking uphill with dog.   - Will order a repeat echo at time of next visit.    
- Target BP < 130/80  .  - Little up today, rushed to get here as she was 10 minutes late. Well controlled at other apts.   - Continue Lisinopril 10mg q PM  - Continue Verapamil 120mg ER 120mg nightly   
- continue statin and ace.   
- target LDL <  70  - FLP 3/14/23:  , , HDL 37, LDL 76  - FLP 2/24/24: , , HDL 47, LDL 80 - Simvastatin 20mg  - FLP 12/19/24 - , TG 73, HDL 45, LDL 44   - FLP 4/21/25 - , , HDL 43, LDL 59  .  - At goal!  - Continue Atorvastatin 20mg q PM  
20-Jul-2021 23:17

## 2025-04-23 NOTE — TELEPHONE ENCOUNTER
Pt called to state that pt will be in few min. Stuck in traffic. Pt can be reached @ [phone]

## 2025-04-23 NOTE — LETTER
"April 23, 2025     Billy Antunez DO  [address]  [facility] [address]    Patient: Citlali Gifford  YOB: 1955  Date of Visit: 4/23/2025      Dear Dr. Antunez:    Thank you for referring Citlali Gifford to me for evaluation. Below are my notes for this consultation.    If you have questions, please do not hesitate to call me. I look forward to following your patient along with you.         Sincerely,        Alvaro Cooley DO        CC: MD Ruma Madden CRNP Kornberg, William N, DO  4/23/2025  9:19 AM  Sign when Signing Visit       Cardiology  Office Progress Note         Reason for visit:   Chief Complaint   Patient presents with    Follow-up       HPI    Citlali Gifford is a 69 y.o. female who presents to the office for cardiovascular follow up and management of hypertension, dyslipidemia, aortic stenosis and mitral regurgitation. Other PMH includes type 2 DM.      She was last seen in the office by TERESA Guerrero on 9/16/24. At that visit, she reported remaining active without CV limitations. She continued to have mild FATIMA with stairs that remained chronic and stable. She offered not additional cardiac complaints.      She was ordered repeat labs and asked to follow up in 3 months.     Labs 12/19/24 - , TG 73, HDL 45, LDL 44  Hgb A1c 6.4    Echo 12/24/24 - Compared to prior study of 2/7/2024, there is redemonstrated LVH with mid cavitary obstruction gradient. The obstruction gradient is higher on the current study. There is redemonstrated moderate to severe aortic stenosis and the peak velocity and mean gradient are higher on the current study.     CT Heart Coronary Calcium Score 2/19/25    IMPRESSION:  1. Composite Agatston coronary artery calcium score of 109, which is between the  50 and 75 percentile for females between the ages of 65 and 74, as per CHAVES 2006  Database. This correlates to \"definite, at least moderate atherosclerotic  plaque\" " "with \"mild coronary artery disease highly likely, significant narrowings  possible\".  2. Incidental severe calcifications of the tricuspid aortic valve.  3. The visualized noncardiac portions of the exam are within normal limits for  patient age.    Labs 25 - , , HDL 43, LDL 59    Today she reports feeling ok. She admits to increased stress in her life recently. She has not been monitoring her blood pressures at home. 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 She continues to experience mild FATIMA with hills and stairs. This has remained unchanged from previous reports. She has been tolerating her medications without any adverse reactions. She denies any chest pain, shortness of breath at rest, palpitations, lightheadedness, dizziness or syncopal events.     Past Medical History:   Diagnosis Date    Anxiety     Aortic stenosis     Claustrophobia     Elevated liver enzymes     dx-  fatty liver    Hypertension     Impaired hearing     bilateral - no hearing aids    Lipid disorder     Numbness     left foot- secondary to previous  back injury     Postmenopausal     Sleep apnea     compliant with CPAP- nightly    Tendonitis     left shoulder - treatment with PT    Type 2 diabetes mellitus (CMS/HCC)     Vertigo     ongoing- intermittent with position changes- treatment with meclizine and nasal spray       Past Surgical History   Procedure Laterality Date     section      Cholecystectomy      COLONOSCOPY (open access) N/A 8/15/2023    Performed by [facility] at [facility]    Dilation and evacuation      Endometrial ablation      FLEXIBLE COLONOSCOPY PROXIMAL TO SPLENIC FLEXURE WITH BIOPSY N/A 2018    Performed by Liam [facility] at [facility]    Gallbladder surgery      Hernia repair      Hysterectomy      Knee arthroscopy Right     repair torn meniscus    Tonsillectomy         Social History     Tobacco Use    Smoking status: " "Never    Smokeless tobacco: Never   Vaping Use    Vaping status: Never Used   Substance Use Topics    Alcohol use: Not Currently     Comment: ocasional    Drug use: Never       Family History   Problem Relation Name Age of Onset    Hypertension Biological Mother      Diabetes Biological Mother      Heart attack Biological Father      No Known Problems Biological Sister      Hypertension Biological Brother      Diabetes Maternal Grandmother      Colon cancer Paternal Grandfather      No Known Problems Biological Daughter      Hyperlipidemia Biological Daughter      No Known Problems Biological Son         Allergies:  Amoxicillin-pot clavulanate, Sulfa (sulfonamide antibiotics), and Wellbutrin [bupropion hcl]    Current Outpatient Medications   Medication Sig Dispense Refill    acarbose (PRECOSE) 100 mg tablet TAKE 1 TABLET BY MOUTH THREE TIMES A DAY AT START OF EACH MEAL 270 tablet 1    ammonium lactate (LAC-HYDRIN) 12 % lotion APPLY DAILY TO FEET      atorvastatin (LIPITOR) 20 mg tablet TAKE 1 TABLET BY MOUTH EVERY DAY AT NIGHT 30 tablet 6    b complex vitamins tablet daily.      BD ENRIQUETA 2ND GEN PEN NEEDLE 32 gauge x 5/32\" needle USE FOR DAILY INJECTION 100 each 3    blood sugar diagnostic (CONTOUR NEXT TEST STRIPS) strip Test glucose once daily 100 strip 3    blood sugar diagnostic (ONETOUCH VERIO TEST STRIPS) strip TEST 3 TIMES A  strip 7    blood-glucose meter (ONETOUCH VERIO FLEX METER) misc CHECK GLUCOSE 3 TIMES ADAY 300 each 3    cholecalciferol, vitamin D3, 25 mcg (1,000 unit) capsule One tablet daily      clotrimazole-betamethasone (LOTRISONE) 1-0.05 % cream Apply topically as needed for itching. 30 g 2    folic acid (FOLVITE) 1 mg tablet Take 1 mg by mouth daily.      lancets (ONETOUCH DELICA LANCETS) 33 gauge misc Check glucose 3 times aday 200 each 3    lisinopriL (PRINIVIL) 10 mg tablet TAKE 1 TABLET BY MOUTH EVERY DAY 90 tablet 1    metFORMIN (GLUCOPHAGE) 500 mg tablet " TAKE 2 TABLETS BY MOUTH TWICE DAILY WITH FOOD 120 tablet 0    omega-3 fatty acids-fish oil 300-1,000 mg capsule take 1 capsule daily      omeprazole (PriLOSEC) 20 mg capsule Take 20 mg by mouth daily before breakfast.      OZEMPIC 1 mg/dose (4 mg/3 mL) subcutaneous injection INJECT 1 MG SUBCUTANEOUSLY EVERY 7 DAYS 3 mL 1    resvera/chrom/gr.tea/EGCG/dig3 (RESVERATROL DIET ORAL) Take by mouth.      sertraline (ZOLOFT) 100 mg tablet Take 100 mg by mouth every evening.  2    verapamil SR (CALAN-SR) 120 mg CR tablet Take 1 tablet (120 mg total) by mouth nightly. 90 tablet 3    zinc sulfate (ZINCATE) 220 (50) mg capsule Take 220 mg by mouth daily.       No current facility-administered medications for this visit.       Review of Systems   Constitutional: Negative for decreased appetite, malaise/fatigue, weight gain and weight loss.   Cardiovascular:  Positive for dyspnea on exertion.   Respiratory:  Negative for cough, shortness of breath and snoring.    Hematologic/Lymphatic: Does not bruise/bleed easily.   Musculoskeletal:  Positive for arthritis and joint pain.   Gastrointestinal:  Negative for heartburn.   Genitourinary:  Negative for hematuria and nocturia.   Neurological:  Negative for dizziness and light-headedness.   Psychiatric/Behavioral:  The patient does not have insomnia.    All other systems reviewed and are negative.      Objective    Vitals:    04/23/25 0815   BP: 132/82   Pulse: 64   SpO2: 98%       Wt Readings from Last 5 Encounters:   04/23/25 87.5 kg (193 lb)   12/24/24 87.1 kg (192 lb)   12/10/24 87.1 kg (192 lb)   09/16/24 87.8 kg (193 lb 9.6 oz)   06/27/24 86.6 kg (191 lb)       Body mass index is 36.47 kg/m².    Physical Exam  Vitals and nursing note reviewed.   Constitutional:       Appearance: Normal appearance. She is well-developed.   HENT:      Head: Normocephalic and atraumatic.   Eyes:      General: No scleral icterus.  Neck:      Vascular: Carotid bruit present. No JVD.    Cardiovascular:      Rate and Rhythm: Normal rate and regular rhythm.      Pulses: Normal pulses and intact distal pulses.           Carotid pulses are 2+ on the right side with bruit and 2+ on the left side with bruit.       Radial pulses are 2+ on the right side and 2+ on the left side.        Dorsalis pedis pulses are 2+ on the right side and 2+ on the left side.        Posterior tibial pulses are 2+ on the right side and 2+ on the left side.      Heart sounds: S1 normal. Murmur (III/VI RADHA at base that radiates towards bilateral carotids. Diminished S2.) heard.      No friction rub. No gallop. No S3 or S4 sounds.   Pulmonary:      Effort: Pulmonary effort is normal. No respiratory distress.      Breath sounds: Normal breath sounds. No stridor. No wheezing, rhonchi or rales.   Chest:      Chest wall: No tenderness.   Abdominal:      General: Bowel sounds are normal. There is no distension.      Palpations: Abdomen is soft. There is no mass.      Tenderness: There is no abdominal tenderness. There is no guarding or rebound.   Musculoskeletal:      Right lower leg: No edema.      Left lower leg: No edema.   Skin:     General: Skin is warm and dry.      Coloration: Skin is not pale.      Findings: No erythema or rash.   Neurological:      General: No focal deficit present.      Mental Status: She is alert and oriented to person, place, and time.   Psychiatric:         Mood and Affect: Mood normal.         Behavior: Behavior normal.         Thought Content: Thought content normal.         Judgment: Judgment normal.       ECG   sinus rhythm, I have independently reviewed the patient's ECG results.  Significant abnormals are :.  Normal sinus rhythm 64bpm  Nonspecific ST and T wave abnormality   Abnormal ECG   When compared with ECG of 16-SEP-2024 07:51,   No significant change was found   Confirmed by Alvaro Cooley ([id number]) on 4/23/2025 8:26:28 AM     Hematology  Lab Results   Component Value Date    WBC 6.7  12/15/2020    HGB 14.4 12/15/2020    HCT 43.9 12/15/2020     12/15/2020    INR 1.0 12/05/2016     Chemistries  Lab Results   Component Value Date     04/21/2025    K 4.5 04/21/2025     04/21/2025    CREATININE 0.78 04/21/2025    BUN 24 04/21/2025    CO2 26 04/21/2025    GLUCOSE 107 (H) 04/21/2025    CALCIUM 9.6 04/21/2025    MG 2.2 12/05/2016    ALT 36 (H) 04/21/2025    AST 22 04/21/2025     Cholesterol  Lab Results   Component Value Date    CHOL 122 04/21/2025    TRIG 112 04/21/2025    HDL 43 (L) 04/21/2025    LDLCALC 59 04/21/2025     Endocrine  Lab Results   Component Value Date    TSH 3.03 12/15/2020    FREET4 0.86 10/26/2015    HGBA1C 6.4 (H) 12/19/2024     Cardiac Imaging    TRANSTHORACIC ECHO (TTE) COMPLETE 12/24/2024    Interpretation Summary    Left Ventricle: Normal ventricle size. Concentric left ventricular hypertrophy. Hyperdynamic systolic function. Estimated EF >75%.  Mild mid cavitary obstruction gradient of 19 mmHg.  Peak obstruction gradient 69 mmHg with Valsalva, undetermined level of obstruction given continuous-wave Doppler, likely mid cavitary. Diastolic dysfunction.    Right Ventricle: Normal ventricle size. Normal systolic function.    Left Atrium: Mildly dilated atrium.    Right Atrium: Normal sized atrium.    Aortic Valve: Valve not well seen but likely trileaflet.  Diffuse calcification present. Trace regurgitation. Moderate to severe stenosis. Peak velocity = 3.90 m/s. Mean gradient = 31.00 mmHg. Calculated area by cont eq = 1.37 cm2. Calculated dimensionless index = 0.44.    Mitral Valve: Sclerotic mitral valve. Normal leaflet motion. Mitral annular calcification. Trace regurgitation.    Tricuspid Valve: Normal structure. Mild regurgitation. Estimated RVSP = 27 mmHg.    Pulmonic Valve: Normal structure. No regurgitation. No stenosis.    Aorta: Aortic root normal. Sinuses of Valsalva normal-sized. Ascending aorta normal-sized. Aortic arch normal-sized.  Descending aorta not well visualized.    IVC/SVC: Inferior vena cava is <2.1cm. Inferior vena cava collapses >50% during inspiration.    Pericardium: No evidence of pericardial effusion.    Compared to prior study of 2/7/2024, there is redemonstrated LVH with mid cavitary obstruction gradient.  The obstruction gradient is higher on the current study.  There is redemonstrated moderate to severe aortic stenosis and the peak velocity and mean gradient are higher on the current study.    Assessment/Plan    Essential hypertension  - Target BP < 130/80  .  - Little up today, rushed to get here as she was 10 minutes late. Well controlled at other apts.   - Continue Lisinopril 10mg q PM  - Continue Verapamil 120mg ER 120mg nightly     Valvular heart disease  - Echo 1/2023: Mild CLVH. Ef 70% Intracavitary gradient. GI DD. Moderate AS, peak 3.18m/s, mean 22mmHg, KASSY 1.52cm2. DI 0.40. Mild MR.   - Echo 2/7/24: Mild CLVH. Mod mid-cavitary obstruction, resting grad 32mmHg, increases to 66mmHg with valsalva. Hyperdynamic systolic function. EF 75% Mod-Severe AS, peak 3.84m/s, mean 32mmHg, KASSY 1.04cm2.   .  - Continue same medications  - Continue Verapamil 120mg q PM and monitor for improvement in intracavitary gradients and mild Sx of SOB when walking uphill with dog.   - Will order a repeat echo at time of next visit.      Type 2 diabetes mellitus without complication, without long-term current use of insulin (CMS/[facility])  - continue statin and ace.     Mixed hyperlipidemia  - target LDL <  70  - FLP 3/14/23:  , , HDL 37, LDL 76  - FLP 2/24/24: , , HDL 47, LDL 80 - Simvastatin 20mg  - FLP 12/19/24 - , TG 73, HDL 45, LDL 44   - FLP 4/21/25 - , , HDL 43, LDL 59  .  - At goal!  - Continue Atorvastatin 20mg q PM    Agatston coronary artery calcium score between 100 and 199  - CACS 2/2025: Score of 109.   .  - Continue Atorvastatin    Follow Up Plans:  Return in about 6 months (around  10/23/2025).     I attest that this visit supports the complexity inherent to evaluation and management associated with medical care services that serve as the continuing focal point for all needed health care services and/or medical care services that are part of ongoing care related to this patients single, serious, or complex condition.         I, Hemant Young, am scribing for, and in the presence of, Alvaro Cooley DO.    I, Alvaro Cooley DO, personally performed the services described in this documentation as scribed by Hemant Young in my presence, and it is both accurate and complete.       Alvaro Cooley DO  4/23/2025

## 2025-04-30 ENCOUNTER — TELEPHONE (OUTPATIENT)
Dept: SCHEDULING | Facility: CLINIC | Age: 70
End: 2025-04-30
Payer: MEDICARE

## 2025-06-21 DIAGNOSIS — E11.9 TYPE 2 DIABETES MELLITUS WITHOUT COMPLICATION, WITHOUT LONG-TERM CURRENT USE OF INSULIN (CMS/HCC): ICD-10-CM

## 2025-06-23 RX ORDER — METFORMIN HYDROCHLORIDE 500 MG/1
TABLET ORAL
Qty: 120 TABLET | Refills: 0 | OUTPATIENT
Start: 2025-06-23

## 2025-07-22 NOTE — HPI: SECONDARY COMPLAINT
How Severe Is This Condition?: mild Detail Level: Zone Total Number Of Lesions Treated: 6 Biopsy Type: H and E Skin Tags Removed With: scissors Anesthesia Type: 1% lidocaine with epinephrine Anesthesia Volume In Cc: 0.5 Hemostasis: Drysol Post Procedure Care: Following the procedure hemostasis was acheived with drysol and bandages. Lab: [id number] Lab Facility: 0 [facility] Notes In Note?: No Consent: Written consent was obtained and risks were reviewed including but not limited to scarring, infection, bleeding, scabbing, incomplete removal, nerve damage and allergy to anesthesia. Post-Care Instructions: I reviewed with the patient in detail post-care instructions. Patient is to keep the site dry overnight, and then apply bacitracin twice daily until healed. Notification Instructions: Patient will be notified of biopsy results. However, patient instructed to call the office if not contacted within 2 weeks. Billing Type: Third-Party Bill

## 2025-07-26 ENCOUNTER — HOSPITAL ENCOUNTER (OUTPATIENT)
Dept: RADIOLOGY | Age: 70
Discharge: HOME | End: 2025-07-26
Attending: FAMILY MEDICINE
Payer: MEDICARE

## 2025-07-26 DIAGNOSIS — Z12.31 BREAST CANCER SCREENING BY MAMMOGRAM: ICD-10-CM

## 2025-07-26 PROCEDURE — 77067 SCR MAMMO BI INCL CAD: CPT

## 2025-07-28 DIAGNOSIS — I10 ESSENTIAL HYPERTENSION: Chronic | ICD-10-CM

## 2025-07-28 RX ORDER — HYDROCHLOROTHIAZIDE 25 MG/1
120 TABLET ORAL NIGHTLY
Qty: 90 TABLET | Refills: 3 | Status: SHIPPED | OUTPATIENT
Start: 2025-07-28

## 2025-07-28 NOTE — TELEPHONE ENCOUNTER
"[facility] Refill Request      MA Notes:      Nurse Notes:      Last Office Visit: 4/23/2025  Last Telemedicine Visit: Visit date not found    Next Office Visit: 11/06/2025  Next Telemedicine Visit: Visit date not found     Most Recent BP Readings:  BP Readings from Last 3 Encounters:   04/23/25 132/82   12/24/24 129/79   12/10/24 120/80       Most recent Lab results:  Lab Results   Component Value Date    CHOL 122 04/21/2025    HDL 43 (L) 04/21/2025    TRIG 112 04/21/2025       Lab Results   Component Value Date    AST 22 04/21/2025    ALT 36 (H) 04/21/2025       Lab Results   Component Value Date     04/21/2025    K 4.5 04/21/2025    BUN 24 04/21/2025    CREATININE 0.78 04/21/2025       Current Medications:    Current Outpatient Medications:     acarbose (PRECOSE) 100 mg tablet, TAKE 1 TABLET BY MOUTH THREE TIMES A DAY AT START OF EACH MEAL, Disp: 270 tablet, Rfl: 1    ammonium lactate (LAC-HYDRIN) 12 % lotion, APPLY DAILY TO FEET, Disp: , Rfl:     atorvastatin (LIPITOR) 20 mg tablet, TAKE 1 TABLET BY MOUTH EVERY DAY AT NIGHT, Disp: 30 tablet, Rfl: 6    b complex vitamins tablet, daily., Disp: , Rfl:     BD ENRIQUETA 2ND GEN PEN NEEDLE 32 gauge x 5/32\" needle, USE FOR DAILY INJECTION, Disp: 100 each, Rfl: 3    blood sugar diagnostic (CONTOUR NEXT TEST STRIPS) strip, Test glucose once daily, Disp: 100 strip, Rfl: 3    blood sugar diagnostic (ONETOUCH VERIO TEST STRIPS) strip, TEST 3 TIMES A DAY, Disp: 100 strip, Rfl: 7    blood-glucose meter (ONETOUCH VERIO FLEX METER) [facility] [place] GLUCOSE 3 TIMES ADAY, Disp: 300 each, Rfl: 3    cholecalciferol, vitamin D3, 25 mcg (1,000 unit) capsule, One tablet daily, Disp: , Rfl:     clotrimazole-betamethasone (LOTRISONE) 1-0.05 % cream, Apply topically as needed for itching., Disp: 30 g, Rfl: 2    folic acid (FOLVITE) 1 mg tablet, Take 1 mg by mouth daily., Disp: , Rfl:     lancets (ONETOUCH DELICA LANCETS) 33 gauge misc, Check glucose 3 times " lucian, Disp: 200 each, Rfl: 3    lisinopriL (PRINIVIL) 10 mg tablet, TAKE 1 TABLET BY MOUTH EVERY DAY, Disp: 90 tablet, Rfl: 1    metFORMIN (GLUCOPHAGE) 500 mg tablet, TAKE 2 TABLETS BY MOUTH TWICE DAILY WITH FOOD, Disp: 120 tablet, Rfl: 0    omega-3 fatty acids-fish oil 300-1,000 mg capsule, take 1 capsule daily, Disp: , Rfl:     omeprazole (PriLOSEC) 20 mg capsule, Take 20 mg by mouth daily before breakfast., Disp: , Rfl:     OZEMPIC 1 mg/dose (4 mg/3 mL) subcutaneous injection, INJECT 1 MG SUBCUTANEOUSLY EVERY 7 DAYS, Disp: 3 mL, Rfl: 1    resvera/chrom/gr.tea/EGCG/dig3 (RESVERATROL DIET ORAL), Take by mouth., Disp: , Rfl:     sertraline (ZOLOFT) 100 mg tablet, Take 100 mg by mouth every evening., Disp: , Rfl: 2    verapamil SR (CALAN-SR) 120 mg CR tablet, Take 1 tablet (120 mg total) by mouth nightly., Disp: 90 tablet, Rfl: 3    zinc sulfate (ZINCATE) 220 (50) mg capsule, Take 220 mg by mouth daily., Disp: , Rfl:

## 2025-08-07 RX ORDER — LISINOPRIL 10 MG/1
10 TABLET ORAL DAILY
Qty: 90 TABLET | Refills: 1 | Status: SHIPPED | OUTPATIENT
Start: 2025-08-07

## 2025-08-07 NOTE — TELEPHONE ENCOUNTER
"[facility] at [facility] Refill Request      MA Notes:      Nurse Notes:      Last Office Visit: 4/23/2025  Last Telemedicine Visit: Visit date not found    Next Office Visit: Visit date not found  Next Telemedicine Visit: Visit date not found     Most Recent BP Readings:  BP Readings from Last 3 Encounters:   04/23/25 132/82   12/24/24 129/79   12/10/24 120/80       Most recent Lab results:  Lab Results   Component Value Date    CHOL 122 04/21/2025    HDL 43 (L) 04/21/2025    TRIG 112 04/21/2025       Lab Results   Component Value Date    AST 22 04/21/2025    ALT 36 (H) 04/21/2025       Lab Results   Component Value Date     04/21/2025    K 4.5 04/21/2025    BUN 24 04/21/2025    CREATININE 0.78 04/21/2025       Current Medications:    Current Outpatient Medications:     acarbose (PRECOSE) 100 mg tablet, TAKE 1 TABLET BY MOUTH THREE TIMES A DAY AT START OF EACH MEAL, Disp: 270 tablet, Rfl: 1    ammonium lactate (LAC-HYDRIN) 12 % lotion, APPLY DAILY TO FEET, Disp: , Rfl:     atorvastatin (LIPITOR) 20 mg tablet, TAKE 1 TABLET BY MOUTH EVERY DAY AT NIGHT, Disp: 30 tablet, Rfl: 6    b complex vitamins tablet, daily., Disp: , Rfl:     BD ENRIQUETA 2ND GEN PEN NEEDLE 32 gauge x 5/32\" needle, USE FOR DAILY INJECTION, Disp: 100 each, Rfl: 3    blood sugar diagnostic (CONTOUR NEXT TEST STRIPS) strip, Test glucose once daily, Disp: 100 strip, Rfl: 3    blood sugar diagnostic (ONETOUCH VERIO TEST STRIPS) strip, TEST 3 TIMES A DAY, Disp: 100 strip, Rfl: 7    blood-glucose meter (ONETOUCH VERIO FLEX METER) [facility] [place], CHECK GLUCOSE 3 TIMES ADAY, Disp: 300 each, Rfl: 3    cholecalciferol, vitamin D3, 25 mcg (1,000 unit) capsule, One tablet daily, Disp: , Rfl:     clotrimazole-betamethasone (LOTRISONE) 1-0.05 % cream, Apply topically as needed for itching., Disp: 30 g, Rfl: 2    folic acid (FOLVITE) 1 mg tablet, Take 1 mg by mouth daily., Disp: , Rfl:     lancets (ONETOUCH DELICA LANCETS) 33 gauge misc, Check glucose 3 " times aday, Disp: 200 each, Rfl: 3    lisinopriL (PRINIVIL) 10 mg tablet, TAKE 1 TABLET BY MOUTH EVERY DAY, Disp: 90 tablet, Rfl: 1    metFORMIN (GLUCOPHAGE) 500 mg tablet, TAKE 2 TABLETS BY MOUTH TWICE DAILY WITH FOOD, Disp: 120 tablet, Rfl: 0    omega-3 fatty acids-fish oil 300-1,000 mg capsule, take 1 capsule daily, Disp: , Rfl:     omeprazole (PriLOSEC) 20 mg capsule, Take 20 mg by mouth daily before breakfast., Disp: , Rfl:     OZEMPIC 1 mg/dose (4 mg/3 mL) subcutaneous injection, INJECT 1 MG SUBCUTANEOUSLY EVERY 7 DAYS, Disp: 3 mL, Rfl: 1    resvera/chrom/gr.tea/EGCG/dig3 (RESVERATROL DIET ORAL), Take by mouth., Disp: , Rfl:     sertraline (ZOLOFT) 100 mg tablet, Take 100 mg by mouth every evening., Disp: , Rfl: 2    verapamil SR (CALAN-SR) 120 mg CR tablet, TAKE 1 TABLET BY MOUTH NIGHTLY, Disp: 90 tablet, Rfl: 3    zinc sulfate (ZINCATE) 220 (50) mg capsule, Take 220 mg by mouth daily., Disp: , Rfl:

## 2025-08-18 ENCOUNTER — TELEPHONE (OUTPATIENT)
Dept: SCHEDULING | Facility: CLINIC | Age: 70
End: 2025-08-18
Payer: MEDICARE

## 2025-08-18 ENCOUNTER — OFFICE VISIT (OUTPATIENT)
Dept: ENDOCRINOLOGY | Facility: CLINIC | Age: 70
End: 2025-08-18
Payer: MEDICARE

## 2025-08-18 VITALS
OXYGEN SATURATION: 98 % | SYSTOLIC BLOOD PRESSURE: 100 MMHG | HEART RATE: 68 BPM | HEIGHT: 61 IN | RESPIRATION RATE: 16 BRPM | DIASTOLIC BLOOD PRESSURE: 64 MMHG | WEIGHT: 194 LBS | BODY MASS INDEX: 36.63 KG/M2

## 2025-08-18 DIAGNOSIS — E11.65 TYPE 2 DIABETES MELLITUS WITH HYPERGLYCEMIA, WITHOUT LONG-TERM CURRENT USE OF INSULIN (CMS/HCC): Primary | ICD-10-CM

## 2025-08-18 DIAGNOSIS — E11.9 TYPE 2 DIABETES MELLITUS WITHOUT COMPLICATION, WITHOUT LONG-TERM CURRENT USE OF INSULIN (CMS/HCC): ICD-10-CM

## 2025-08-18 PROCEDURE — G8754 DIAS BP LESS 90: HCPCS | Performed by: INTERNAL MEDICINE

## 2025-08-18 PROCEDURE — 99215 OFFICE O/P EST HI 40 MIN: CPT | Performed by: INTERNAL MEDICINE

## 2025-08-18 PROCEDURE — G8752 SYS BP LESS 140: HCPCS | Performed by: INTERNAL MEDICINE

## 2025-08-18 RX ORDER — METFORMIN HYDROCHLORIDE 500 MG/1
1000 TABLET ORAL
Qty: 360 TABLET | Refills: 3 | Status: SHIPPED | OUTPATIENT
Start: 2025-08-18

## 2025-08-18 RX ORDER — CETIRIZINE HYDROCHLORIDE 5 MG/1
5 TABLET ORAL DAILY
COMMUNITY

## 2025-08-18 RX ORDER — SEMAGLUTIDE 1.34 MG/ML
1 INJECTION, SOLUTION SUBCUTANEOUS
Qty: 9 ML | Refills: 1 | Status: SHIPPED | OUTPATIENT
Start: 2025-08-18

## 2025-08-18 RX ORDER — ACARBOSE 100 MG/1
TABLET ORAL
Qty: 270 TABLET | Refills: 1 | Status: SHIPPED | OUTPATIENT
Start: 2025-08-18

## 2025-08-20 RX ORDER — LISINOPRIL 10 MG/1
10 TABLET ORAL DAILY
Qty: 90 TABLET | Refills: 1 | Status: SHIPPED | OUTPATIENT
Start: 2025-08-20

## 2025-08-24 LAB
ALBUMIN SERPL-MCNC: 4.3 G/DL (ref 3.6–5.1)
ALBUMIN/CREAT UR: NORMAL MG/G CREAT
ALBUMIN/GLOB SERPL: 1.9 (CALC) (ref 1–2.5)
ALP SERPL-CCNC: 61 U/L (ref 37–153)
ALT SERPL-CCNC: 39 U/L (ref 6–29)
AST SERPL-CCNC: 24 U/L (ref 10–35)
BILIRUB SERPL-MCNC: 0.4 MG/DL (ref 0.2–1.2)
BUN SERPL-MCNC: 23 MG/DL (ref 7–25)
BUN/CREAT SERPL: ABNORMAL (CALC) (ref 6–22)
CALCIUM SERPL-MCNC: 9.3 MG/DL (ref 8.6–10.4)
CHLORIDE SERPL-SCNC: 103 MMOL/L (ref 98–110)
CHOLEST SERPL-MCNC: 99 MG/DL
CHOLEST/HDLC SERPL: 2.1 (CALC)
CO2 SERPL-SCNC: 25 MMOL/L (ref 20–32)
CREAT SERPL-MCNC: 0.83 MG/DL (ref 0.5–1.05)
CREAT UR-MCNC: 41 MG/DL (ref 20–275)
EGFRCR SERPLBLD CKD-EPI 2021: 77 ML/MIN/1.73M2
GLOBULIN SER CALC-MCNC: 2.3 G/DL (CALC) (ref 1.9–3.7)
GLUCOSE SERPL-MCNC: 107 MG/DL (ref 65–99)
HBA1C MFR BLD: 6.3 %
HDLC SERPL-MCNC: 47 MG/DL
LDLC SERPL CALC-MCNC: 34 MG/DL (CALC)
MICROALBUMIN UR-MCNC: <0.2 MG/DL
NONHDLC SERPL-MCNC: 52 MG/DL (CALC)
POTASSIUM SERPL-SCNC: 4.5 MMOL/L (ref 3.5–5.3)
PROT SERPL-MCNC: 6.6 G/DL (ref 6.1–8.1)
SODIUM SERPL-SCNC: 135 MMOL/L (ref 135–146)
TRIGL SERPL-MCNC: 92 MG/DL
TSH SERPL-ACNC: 2.36 MIU/L (ref 0.4–4.5)

## (undated) DEVICE — CONNECTOR PORT W/VALVE FOR OLYMPUS 160/180 SCOPE

## (undated) DEVICE — CANISTER KIT 1500CC WITH 6FT TUBING SEMI-RIGID SAFELINER

## (undated) DEVICE — BIOGUARD VALVES

## (undated) DEVICE — SET TUBING/CAP HYBRID CO2 CLEVERCAP

## (undated) DEVICE — SOLN IV 0.9% NSS 1000ML

## (undated) DEVICE — TUBE CONNECTOR CO2 OLYMPUS

## (undated) DEVICE — FORCEP COLD RADIAL JAW

## (undated) DEVICE — BIOGUARD AIR/WATER CLEANING ADAPTER